# Patient Record
Sex: FEMALE | Race: WHITE | NOT HISPANIC OR LATINO | Employment: OTHER | URBAN - METROPOLITAN AREA
[De-identification: names, ages, dates, MRNs, and addresses within clinical notes are randomized per-mention and may not be internally consistent; named-entity substitution may affect disease eponyms.]

---

## 2017-02-23 ENCOUNTER — COMMUNICATION - HEALTHEAST (OUTPATIENT)
Dept: INTERNAL MEDICINE | Facility: CLINIC | Age: 77
End: 2017-02-23

## 2017-03-01 ENCOUNTER — RECORDS - HEALTHEAST (OUTPATIENT)
Dept: ADMINISTRATIVE | Facility: OTHER | Age: 77
End: 2017-03-01

## 2017-03-02 ENCOUNTER — OFFICE VISIT - HEALTHEAST (OUTPATIENT)
Dept: INTERNAL MEDICINE | Facility: CLINIC | Age: 77
End: 2017-03-02

## 2017-03-02 DIAGNOSIS — D64.9 ANEMIA: ICD-10-CM

## 2017-03-02 DIAGNOSIS — N18.1 CRI (CHRONIC RENAL INSUFFICIENCY), STAGE 1: ICD-10-CM

## 2017-03-02 DIAGNOSIS — I10 HTN (HYPERTENSION): ICD-10-CM

## 2017-03-02 DIAGNOSIS — Z12.11 COLON CANCER SCREENING: ICD-10-CM

## 2017-03-02 DIAGNOSIS — Z90.49 HISTORY OF RIGHT HEMICOLECTOMY: ICD-10-CM

## 2017-03-02 DIAGNOSIS — M81.0 OSTEOPOROSIS: ICD-10-CM

## 2017-03-02 ASSESSMENT — MIFFLIN-ST. JEOR: SCORE: 986.17

## 2017-03-06 ENCOUNTER — AMBULATORY - HEALTHEAST (OUTPATIENT)
Dept: LAB | Facility: CLINIC | Age: 77
End: 2017-03-06

## 2017-03-06 DIAGNOSIS — D64.9 ANEMIA: ICD-10-CM

## 2017-03-06 DIAGNOSIS — M81.0 OSTEOPOROSIS: ICD-10-CM

## 2017-03-06 DIAGNOSIS — I10 HTN (HYPERTENSION): ICD-10-CM

## 2017-03-06 LAB
CHOLEST SERPL-MCNC: 191 MG/DL
FASTING STATUS PATIENT QL REPORTED: YES
HDLC SERPL-MCNC: 91 MG/DL
LDLC SERPL CALC-MCNC: 83 MG/DL
TRIGL SERPL-MCNC: 83 MG/DL

## 2017-05-30 ENCOUNTER — RECORDS - HEALTHEAST (OUTPATIENT)
Dept: ADMINISTRATIVE | Facility: OTHER | Age: 77
End: 2017-05-30

## 2017-06-30 ENCOUNTER — OFFICE VISIT - HEALTHEAST (OUTPATIENT)
Dept: INTERNAL MEDICINE | Facility: CLINIC | Age: 77
End: 2017-06-30

## 2017-06-30 DIAGNOSIS — I10 HTN (HYPERTENSION): ICD-10-CM

## 2017-06-30 DIAGNOSIS — M85.80 OSTEOPENIA: ICD-10-CM

## 2017-06-30 DIAGNOSIS — Z12.11 COLON CANCER SCREENING: ICD-10-CM

## 2017-06-30 DIAGNOSIS — R09.82 PND (POST-NASAL DRIP): ICD-10-CM

## 2017-06-30 DIAGNOSIS — F41.9 ANXIETY: ICD-10-CM

## 2017-06-30 ASSESSMENT — MIFFLIN-ST. JEOR: SCORE: 993.26

## 2017-07-17 ENCOUNTER — OFFICE VISIT - HEALTHEAST (OUTPATIENT)
Dept: BEHAVIORAL HEALTH | Facility: CLINIC | Age: 77
End: 2017-07-17

## 2017-07-17 DIAGNOSIS — F43.22 ADJUSTMENT DISORDER WITH ANXIOUS MOOD: ICD-10-CM

## 2017-07-26 ENCOUNTER — OFFICE VISIT - HEALTHEAST (OUTPATIENT)
Dept: BEHAVIORAL HEALTH | Facility: CLINIC | Age: 77
End: 2017-07-26

## 2017-07-26 DIAGNOSIS — F43.22 ADJUSTMENT DISORDER WITH ANXIOUS MOOD: ICD-10-CM

## 2017-07-30 ENCOUNTER — AMBULATORY - HEALTHEAST (OUTPATIENT)
Dept: BEHAVIORAL HEALTH | Facility: CLINIC | Age: 77
End: 2017-07-30

## 2017-08-02 ENCOUNTER — OFFICE VISIT - HEALTHEAST (OUTPATIENT)
Dept: BEHAVIORAL HEALTH | Facility: CLINIC | Age: 77
End: 2017-08-02

## 2017-08-02 DIAGNOSIS — F43.22 ADJUSTMENT DISORDER WITH ANXIOUS MOOD: ICD-10-CM

## 2017-08-09 ENCOUNTER — OFFICE VISIT - HEALTHEAST (OUTPATIENT)
Dept: BEHAVIORAL HEALTH | Facility: CLINIC | Age: 77
End: 2017-08-09

## 2017-08-09 DIAGNOSIS — F43.22 ADJUSTMENT DISORDER WITH ANXIOUS MOOD: ICD-10-CM

## 2017-08-18 ENCOUNTER — OFFICE VISIT - HEALTHEAST (OUTPATIENT)
Dept: BEHAVIORAL HEALTH | Facility: CLINIC | Age: 77
End: 2017-08-18

## 2017-08-18 DIAGNOSIS — F43.22 ADJUSTMENT DISORDER WITH ANXIOUS MOOD: ICD-10-CM

## 2017-08-25 ENCOUNTER — OFFICE VISIT - HEALTHEAST (OUTPATIENT)
Dept: BEHAVIORAL HEALTH | Facility: CLINIC | Age: 77
End: 2017-08-25

## 2017-08-25 DIAGNOSIS — F43.22 ADJUSTMENT DISORDER WITH ANXIOUS MOOD: ICD-10-CM

## 2017-09-01 ENCOUNTER — OFFICE VISIT - HEALTHEAST (OUTPATIENT)
Dept: BEHAVIORAL HEALTH | Facility: CLINIC | Age: 77
End: 2017-09-01

## 2017-09-01 DIAGNOSIS — F43.22 ADJUSTMENT DISORDER WITH ANXIOUS MOOD: ICD-10-CM

## 2017-09-08 ENCOUNTER — OFFICE VISIT - HEALTHEAST (OUTPATIENT)
Dept: BEHAVIORAL HEALTH | Facility: CLINIC | Age: 77
End: 2017-09-08

## 2017-09-08 DIAGNOSIS — F43.22 ADJUSTMENT DISORDER WITH ANXIOUS MOOD: ICD-10-CM

## 2017-09-15 ENCOUNTER — OFFICE VISIT - HEALTHEAST (OUTPATIENT)
Dept: BEHAVIORAL HEALTH | Facility: CLINIC | Age: 77
End: 2017-09-15

## 2017-09-15 DIAGNOSIS — F43.22 ADJUSTMENT DISORDER WITH ANXIOUS MOOD: ICD-10-CM

## 2017-09-22 ENCOUNTER — OFFICE VISIT - HEALTHEAST (OUTPATIENT)
Dept: BEHAVIORAL HEALTH | Facility: CLINIC | Age: 77
End: 2017-09-22

## 2017-09-22 DIAGNOSIS — F43.22 ADJUSTMENT DISORDER WITH ANXIOUS MOOD: ICD-10-CM

## 2017-09-25 ENCOUNTER — RECORDS - HEALTHEAST (OUTPATIENT)
Dept: MAMMOGRAPHY | Facility: CLINIC | Age: 77
End: 2017-09-25

## 2017-09-25 ENCOUNTER — RECORDS - HEALTHEAST (OUTPATIENT)
Dept: BONE DENSITY | Facility: CLINIC | Age: 77
End: 2017-09-25

## 2017-09-25 ENCOUNTER — RECORDS - HEALTHEAST (OUTPATIENT)
Dept: ADMINISTRATIVE | Facility: OTHER | Age: 77
End: 2017-09-25

## 2017-09-25 DIAGNOSIS — M85.80 OTHER SPECIFIED DISORDERS OF BONE DENSITY AND STRUCTURE, UNSPECIFIED SITE: ICD-10-CM

## 2017-09-25 DIAGNOSIS — Z12.31 ENCOUNTER FOR SCREENING MAMMOGRAM FOR MALIGNANT NEOPLASM OF BREAST: ICD-10-CM

## 2017-09-29 ENCOUNTER — OFFICE VISIT - HEALTHEAST (OUTPATIENT)
Dept: BEHAVIORAL HEALTH | Facility: CLINIC | Age: 77
End: 2017-09-29

## 2017-09-29 DIAGNOSIS — F43.22 ADJUSTMENT DISORDER WITH ANXIOUS MOOD: ICD-10-CM

## 2017-10-06 ENCOUNTER — OFFICE VISIT - HEALTHEAST (OUTPATIENT)
Dept: BEHAVIORAL HEALTH | Facility: CLINIC | Age: 77
End: 2017-10-06

## 2017-10-06 DIAGNOSIS — F43.22 ADJUSTMENT DISORDER WITH ANXIOUS MOOD: ICD-10-CM

## 2017-10-18 ENCOUNTER — OFFICE VISIT - HEALTHEAST (OUTPATIENT)
Dept: FAMILY MEDICINE | Facility: CLINIC | Age: 77
End: 2017-10-18

## 2017-10-18 DIAGNOSIS — R11.10 VOMITING: ICD-10-CM

## 2017-10-18 ASSESSMENT — MIFFLIN-ST. JEOR: SCORE: 947.16

## 2017-10-19 ENCOUNTER — SURGERY - HEALTHEAST (OUTPATIENT)
Dept: SURGERY | Facility: HOSPITAL | Age: 77
End: 2017-10-19

## 2017-10-19 ENCOUNTER — ANESTHESIA - HEALTHEAST (OUTPATIENT)
Dept: SURGERY | Facility: HOSPITAL | Age: 77
End: 2017-10-19

## 2017-10-23 ASSESSMENT — MIFFLIN-ST. JEOR: SCORE: 962.59

## 2017-10-25 ASSESSMENT — MIFFLIN-ST. JEOR: SCORE: 967.12

## 2017-10-26 ASSESSMENT — MIFFLIN-ST. JEOR: SCORE: 979.65

## 2017-10-29 ASSESSMENT — MIFFLIN-ST. JEOR: SCORE: 915.41

## 2017-10-30 ENCOUNTER — COMMUNICATION - HEALTHEAST (OUTPATIENT)
Dept: INTERNAL MEDICINE | Facility: CLINIC | Age: 77
End: 2017-10-30

## 2017-11-03 ENCOUNTER — OFFICE VISIT - HEALTHEAST (OUTPATIENT)
Dept: SURGERY | Facility: CLINIC | Age: 77
End: 2017-11-03

## 2017-11-03 ENCOUNTER — OFFICE VISIT - HEALTHEAST (OUTPATIENT)
Dept: INTERNAL MEDICINE | Facility: CLINIC | Age: 77
End: 2017-11-03

## 2017-11-03 DIAGNOSIS — R79.0 LOW MAGNESIUM LEVEL: ICD-10-CM

## 2017-11-03 DIAGNOSIS — Z09 HOSPITAL DISCHARGE FOLLOW-UP: ICD-10-CM

## 2017-11-03 DIAGNOSIS — Z48.89 POSTOPERATIVE VISIT: ICD-10-CM

## 2017-11-03 DIAGNOSIS — K56.609 SBO (SMALL BOWEL OBSTRUCTION) (H): ICD-10-CM

## 2017-11-03 DIAGNOSIS — I10 HTN (HYPERTENSION): ICD-10-CM

## 2017-11-03 ASSESSMENT — MIFFLIN-ST. JEOR: SCORE: 938.09

## 2017-11-05 ENCOUNTER — COMMUNICATION - HEALTHEAST (OUTPATIENT)
Dept: INTERNAL MEDICINE | Facility: CLINIC | Age: 77
End: 2017-11-05

## 2017-11-06 ENCOUNTER — OFFICE VISIT - HEALTHEAST (OUTPATIENT)
Dept: BEHAVIORAL HEALTH | Facility: CLINIC | Age: 77
End: 2017-11-06

## 2017-11-06 DIAGNOSIS — F43.22 ADJUSTMENT DISORDER WITH ANXIOUS MOOD: ICD-10-CM

## 2017-11-07 ENCOUNTER — COMMUNICATION - HEALTHEAST (OUTPATIENT)
Dept: INTERNAL MEDICINE | Facility: CLINIC | Age: 77
End: 2017-11-07

## 2017-11-07 DIAGNOSIS — D64.9 ANEMIA: ICD-10-CM

## 2017-11-13 ENCOUNTER — AMBULATORY - HEALTHEAST (OUTPATIENT)
Dept: LAB | Facility: CLINIC | Age: 77
End: 2017-11-13

## 2017-11-13 ENCOUNTER — COMMUNICATION - HEALTHEAST (OUTPATIENT)
Dept: SCHEDULING | Facility: CLINIC | Age: 77
End: 2017-11-13

## 2017-11-13 ENCOUNTER — AMBULATORY - HEALTHEAST (OUTPATIENT)
Dept: INTERNAL MEDICINE | Facility: CLINIC | Age: 77
End: 2017-11-13

## 2017-11-13 DIAGNOSIS — D64.9 ANEMIA: ICD-10-CM

## 2017-11-13 DIAGNOSIS — T81.9XXA POST-OPERATIVE COMPLICATION: ICD-10-CM

## 2017-11-14 ENCOUNTER — RECORDS - HEALTHEAST (OUTPATIENT)
Dept: ADMINISTRATIVE | Facility: OTHER | Age: 77
End: 2017-11-14

## 2017-11-16 ENCOUNTER — COMMUNICATION - HEALTHEAST (OUTPATIENT)
Dept: INTERNAL MEDICINE | Facility: CLINIC | Age: 77
End: 2017-11-16

## 2017-11-16 ENCOUNTER — COMMUNICATION - HEALTHEAST (OUTPATIENT)
Dept: ONCOLOGY | Facility: HOSPITAL | Age: 77
End: 2017-11-16

## 2017-11-16 DIAGNOSIS — D72.819 LEUKOPENIA: ICD-10-CM

## 2017-11-16 DIAGNOSIS — D64.9 ANEMIA: ICD-10-CM

## 2017-11-16 DIAGNOSIS — R10.9 ABDOMINAL PAIN: ICD-10-CM

## 2017-11-17 ENCOUNTER — AMBULATORY - HEALTHEAST (OUTPATIENT)
Dept: LAB | Facility: CLINIC | Age: 77
End: 2017-11-17

## 2017-11-17 ENCOUNTER — OFFICE VISIT - HEALTHEAST (OUTPATIENT)
Dept: BEHAVIORAL HEALTH | Facility: CLINIC | Age: 77
End: 2017-11-17

## 2017-11-17 DIAGNOSIS — F43.22 ADJUSTMENT DISORDER WITH ANXIOUS MOOD: ICD-10-CM

## 2017-11-17 DIAGNOSIS — D64.9 ANEMIA: ICD-10-CM

## 2017-11-21 ENCOUNTER — AMBULATORY - HEALTHEAST (OUTPATIENT)
Dept: INFUSION THERAPY | Facility: HOSPITAL | Age: 77
End: 2017-11-21

## 2017-11-21 ENCOUNTER — OFFICE VISIT - HEALTHEAST (OUTPATIENT)
Dept: ONCOLOGY | Facility: HOSPITAL | Age: 77
End: 2017-11-21

## 2017-11-21 DIAGNOSIS — D64.9 CHRONIC ANEMIA: ICD-10-CM

## 2017-11-21 LAB
BASOPHILS # BLD AUTO: 0 THOU/UL (ref 0–0.2)
BASOPHILS NFR BLD AUTO: 0 % (ref 0–2)
DAT, IGG, C3D (HISTORICAL CONVERSION): NORMAL
EOSINOPHIL # BLD AUTO: 0 THOU/UL (ref 0–0.4)
EOSINOPHIL NFR BLD AUTO: 1 % (ref 0–6)
ERYTHROCYTE [DISTWIDTH] IN BLOOD BY AUTOMATED COUNT: 13.6 % (ref 11–14.5)
HCT VFR BLD AUTO: 25.5 % (ref 35–47)
HGB BLD-MCNC: 8.2 G/DL (ref 12–16)
LYMPHOCYTES # BLD AUTO: 0.9 THOU/UL (ref 0.8–4.4)
LYMPHOCYTES NFR BLD AUTO: 21 % (ref 20–40)
MCH RBC QN AUTO: 31.3 PG (ref 27–34)
MCHC RBC AUTO-ENTMCNC: 32.2 G/DL (ref 32–36)
MCV RBC AUTO: 97 FL (ref 80–100)
MONOCYTES # BLD AUTO: 0.4 THOU/UL (ref 0–0.9)
MONOCYTES NFR BLD AUTO: 10 % (ref 2–10)
NEUTROPHILS # BLD AUTO: 2.8 THOU/UL (ref 2–7.7)
NEUTROPHILS NFR BLD AUTO: 68 % (ref 50–70)
PATH REPORT.MICROSCOPIC SPEC OTHER STN: ABNORMAL
PLATELET # BLD AUTO: 229 THOU/UL (ref 140–440)
PMV BLD AUTO: 10.2 FL (ref 8.5–12.5)
RBC # BLD AUTO: 2.62 MILL/UL (ref 3.8–5.4)
WBC: 4.1 THOU/UL (ref 4–11)

## 2017-11-21 ASSESSMENT — MIFFLIN-ST. JEOR: SCORE: 941.72

## 2017-11-22 LAB
LAB AP CHARGES (HE HISTORICAL CONVERSION): NORMAL
PATH REPORT.COMMENTS IMP SPEC: NORMAL
PATH REPORT.COMMENTS IMP SPEC: NORMAL
PATH REPORT.FINAL DX SPEC: NORMAL

## 2017-11-27 ENCOUNTER — OFFICE VISIT - HEALTHEAST (OUTPATIENT)
Dept: INTERNAL MEDICINE | Facility: CLINIC | Age: 77
End: 2017-11-27

## 2017-11-27 DIAGNOSIS — K43.2 INCISIONAL HERNIA: ICD-10-CM

## 2017-11-27 DIAGNOSIS — K22.70 BARRETT'S ESOPHAGUS WITHOUT DYSPLASIA: ICD-10-CM

## 2017-11-27 DIAGNOSIS — M79.662 PAIN OF LEFT CALF: ICD-10-CM

## 2017-11-27 DIAGNOSIS — I10 HTN (HYPERTENSION): ICD-10-CM

## 2017-11-27 DIAGNOSIS — D64.9 ANEMIA: ICD-10-CM

## 2017-11-27 ASSESSMENT — MIFFLIN-ST. JEOR: SCORE: 950.45

## 2017-11-29 ENCOUNTER — COMMUNICATION - HEALTHEAST (OUTPATIENT)
Dept: INTERNAL MEDICINE | Facility: CLINIC | Age: 77
End: 2017-11-29

## 2017-11-29 DIAGNOSIS — R09.82 PND (POST-NASAL DRIP): ICD-10-CM

## 2017-11-30 ENCOUNTER — RECORDS - HEALTHEAST (OUTPATIENT)
Dept: VASCULAR ULTRASOUND | Facility: CLINIC | Age: 77
End: 2017-11-30

## 2017-11-30 ENCOUNTER — RECORDS - HEALTHEAST (OUTPATIENT)
Dept: ADMINISTRATIVE | Facility: OTHER | Age: 77
End: 2017-11-30

## 2017-11-30 ENCOUNTER — COMMUNICATION - HEALTHEAST (OUTPATIENT)
Dept: INTERNAL MEDICINE | Facility: CLINIC | Age: 77
End: 2017-11-30

## 2017-11-30 DIAGNOSIS — M79.662 PAIN IN LEFT LOWER LEG: ICD-10-CM

## 2017-12-08 ENCOUNTER — OFFICE VISIT - HEALTHEAST (OUTPATIENT)
Dept: SURGERY | Facility: CLINIC | Age: 77
End: 2017-12-08

## 2017-12-13 ENCOUNTER — OFFICE VISIT - HEALTHEAST (OUTPATIENT)
Dept: BEHAVIORAL HEALTH | Facility: CLINIC | Age: 77
End: 2017-12-13

## 2017-12-13 DIAGNOSIS — F43.22 ADJUSTMENT DISORDER WITH ANXIOUS MOOD: ICD-10-CM

## 2018-01-05 ENCOUNTER — OFFICE VISIT - HEALTHEAST (OUTPATIENT)
Dept: BEHAVIORAL HEALTH | Facility: CLINIC | Age: 78
End: 2018-01-05

## 2018-01-05 ENCOUNTER — AMBULATORY - HEALTHEAST (OUTPATIENT)
Dept: LAB | Facility: CLINIC | Age: 78
End: 2018-01-05

## 2018-01-05 DIAGNOSIS — F43.22 ADJUSTMENT DISORDER WITH ANXIOUS MOOD: ICD-10-CM

## 2018-01-05 DIAGNOSIS — D64.9 ANEMIA: ICD-10-CM

## 2018-01-05 LAB
BASOPHILS # BLD AUTO: 0 THOU/UL (ref 0–0.2)
BASOPHILS NFR BLD AUTO: 0 % (ref 0–2)
EOSINOPHIL # BLD AUTO: 0.1 THOU/UL (ref 0–0.4)
EOSINOPHIL NFR BLD AUTO: 2 % (ref 0–6)
ERYTHROCYTE [DISTWIDTH] IN BLOOD BY AUTOMATED COUNT: 13.2 % (ref 11–14.5)
HCT VFR BLD AUTO: 36.5 % (ref 35–47)
HGB BLD-MCNC: 11.8 G/DL (ref 12–16)
LYMPHOCYTES # BLD AUTO: 1.2 THOU/UL (ref 0.8–4.4)
LYMPHOCYTES NFR BLD AUTO: 28 % (ref 20–40)
MCH RBC QN AUTO: 28.2 PG (ref 27–34)
MCHC RBC AUTO-ENTMCNC: 32.3 G/DL (ref 32–36)
MCV RBC AUTO: 87 FL (ref 80–100)
MONOCYTES # BLD AUTO: 0.4 THOU/UL (ref 0–0.9)
MONOCYTES NFR BLD AUTO: 9 % (ref 2–10)
NEUTROPHILS # BLD AUTO: 2.7 THOU/UL (ref 2–7.7)
NEUTROPHILS NFR BLD AUTO: 61 % (ref 50–70)
PLATELET # BLD AUTO: 213 THOU/UL (ref 140–440)
PMV BLD AUTO: 8.2 FL (ref 7–10)
RBC # BLD AUTO: 4.18 MILL/UL (ref 3.8–5.4)
WBC: 4.4 THOU/UL (ref 4–11)

## 2018-02-02 ENCOUNTER — OFFICE VISIT - HEALTHEAST (OUTPATIENT)
Dept: BEHAVIORAL HEALTH | Facility: CLINIC | Age: 78
End: 2018-02-02

## 2018-02-02 DIAGNOSIS — F43.22 ADJUSTMENT DISORDER WITH ANXIOUS MOOD: ICD-10-CM

## 2018-02-05 ENCOUNTER — AMBULATORY - HEALTHEAST (OUTPATIENT)
Dept: BEHAVIORAL HEALTH | Facility: CLINIC | Age: 78
End: 2018-02-05

## 2018-02-16 ENCOUNTER — OFFICE VISIT - HEALTHEAST (OUTPATIENT)
Dept: BEHAVIORAL HEALTH | Facility: CLINIC | Age: 78
End: 2018-02-16

## 2018-02-16 DIAGNOSIS — F43.22 ADJUSTMENT DISORDER WITH ANXIOUS MOOD: ICD-10-CM

## 2018-03-06 ENCOUNTER — OFFICE VISIT - HEALTHEAST (OUTPATIENT)
Dept: INTERNAL MEDICINE | Facility: CLINIC | Age: 78
End: 2018-03-06

## 2018-03-06 DIAGNOSIS — K22.70 BARRETT'S ESOPHAGUS WITHOUT DYSPLASIA: ICD-10-CM

## 2018-03-06 DIAGNOSIS — D64.9 ANEMIA, UNSPECIFIED TYPE: ICD-10-CM

## 2018-03-06 DIAGNOSIS — I10 HTN (HYPERTENSION): ICD-10-CM

## 2018-03-06 LAB
BASOPHILS # BLD AUTO: 0 THOU/UL (ref 0–0.2)
BASOPHILS NFR BLD AUTO: 0 % (ref 0–2)
EOSINOPHIL # BLD AUTO: 0.1 THOU/UL (ref 0–0.4)
EOSINOPHIL NFR BLD AUTO: 2 % (ref 0–6)
ERYTHROCYTE [DISTWIDTH] IN BLOOD BY AUTOMATED COUNT: 15.5 % (ref 11–14.5)
HCT VFR BLD AUTO: 39.6 % (ref 35–47)
HGB BLD-MCNC: 13.5 G/DL (ref 12–16)
IRON SATN MFR SERPL: 32 % (ref 20–50)
IRON SERPL-MCNC: 141 UG/DL (ref 42–175)
LYMPHOCYTES # BLD AUTO: 1.2 THOU/UL (ref 0.8–4.4)
LYMPHOCYTES NFR BLD AUTO: 25 % (ref 20–40)
MCH RBC QN AUTO: 29.3 PG (ref 27–34)
MCHC RBC AUTO-ENTMCNC: 34.2 G/DL (ref 32–36)
MCV RBC AUTO: 86 FL (ref 80–100)
MONOCYTES # BLD AUTO: 0.4 THOU/UL (ref 0–0.9)
MONOCYTES NFR BLD AUTO: 8 % (ref 2–10)
NEUTROPHILS # BLD AUTO: 3.2 THOU/UL (ref 2–7.7)
NEUTROPHILS NFR BLD AUTO: 65 % (ref 50–70)
PLATELET # BLD AUTO: 163 THOU/UL (ref 140–440)
PMV BLD AUTO: 8.2 FL (ref 7–10)
RBC # BLD AUTO: 4.62 MILL/UL (ref 3.8–5.4)
TIBC SERPL-MCNC: 435 UG/DL (ref 313–563)
TRANSFERRIN SERPL-MCNC: 348 MG/DL (ref 212–360)
VIT B12 SERPL-MCNC: 693 PG/ML (ref 213–816)
WBC: 4.9 THOU/UL (ref 4–11)

## 2018-03-06 ASSESSMENT — MIFFLIN-ST. JEOR: SCORE: 947.9

## 2018-04-09 ENCOUNTER — OFFICE VISIT - HEALTHEAST (OUTPATIENT)
Dept: BEHAVIORAL HEALTH | Facility: CLINIC | Age: 78
End: 2018-04-09

## 2018-04-09 DIAGNOSIS — F43.22 ADJUSTMENT DISORDER WITH ANXIOUS MOOD: ICD-10-CM

## 2018-04-23 ENCOUNTER — OFFICE VISIT - HEALTHEAST (OUTPATIENT)
Dept: BEHAVIORAL HEALTH | Facility: CLINIC | Age: 78
End: 2018-04-23

## 2018-04-23 DIAGNOSIS — F43.22 ADJUSTMENT DISORDER WITH ANXIOUS MOOD: ICD-10-CM

## 2018-05-07 ENCOUNTER — OFFICE VISIT - HEALTHEAST (OUTPATIENT)
Dept: BEHAVIORAL HEALTH | Facility: CLINIC | Age: 78
End: 2018-05-07

## 2018-05-07 DIAGNOSIS — F43.22 ADJUSTMENT DISORDER WITH ANXIOUS MOOD: ICD-10-CM

## 2018-05-24 ENCOUNTER — RECORDS - HEALTHEAST (OUTPATIENT)
Dept: ADMINISTRATIVE | Facility: OTHER | Age: 78
End: 2018-05-24

## 2018-06-01 ENCOUNTER — OFFICE VISIT - HEALTHEAST (OUTPATIENT)
Dept: BEHAVIORAL HEALTH | Facility: CLINIC | Age: 78
End: 2018-06-01

## 2018-06-01 DIAGNOSIS — F43.22 ADJUSTMENT DISORDER WITH ANXIOUS MOOD: ICD-10-CM

## 2018-06-15 ENCOUNTER — OFFICE VISIT - HEALTHEAST (OUTPATIENT)
Dept: BEHAVIORAL HEALTH | Facility: CLINIC | Age: 78
End: 2018-06-15

## 2018-06-15 DIAGNOSIS — F43.22 ADJUSTMENT DISORDER WITH ANXIOUS MOOD: ICD-10-CM

## 2018-06-29 ENCOUNTER — OFFICE VISIT - HEALTHEAST (OUTPATIENT)
Dept: BEHAVIORAL HEALTH | Facility: CLINIC | Age: 78
End: 2018-06-29

## 2018-06-29 DIAGNOSIS — F43.22 ADJUSTMENT DISORDER WITH ANXIOUS MOOD: ICD-10-CM

## 2018-07-13 ENCOUNTER — OFFICE VISIT - HEALTHEAST (OUTPATIENT)
Dept: BEHAVIORAL HEALTH | Facility: CLINIC | Age: 78
End: 2018-07-13

## 2018-07-13 DIAGNOSIS — F41.1 GAD (GENERALIZED ANXIETY DISORDER): ICD-10-CM

## 2018-07-13 DIAGNOSIS — F43.22 ADJUSTMENT DISORDER WITH ANXIOUS MOOD: ICD-10-CM

## 2018-07-25 ENCOUNTER — OFFICE VISIT - HEALTHEAST (OUTPATIENT)
Dept: INTERNAL MEDICINE | Facility: CLINIC | Age: 78
End: 2018-07-25

## 2018-07-25 DIAGNOSIS — H61.21 EXCESSIVE EAR WAX, RIGHT: ICD-10-CM

## 2018-07-25 DIAGNOSIS — J30.9 ALLERGIC RHINITIS: ICD-10-CM

## 2018-07-25 ASSESSMENT — MIFFLIN-ST. JEOR: SCORE: 963.49

## 2018-07-28 ENCOUNTER — AMBULATORY - HEALTHEAST (OUTPATIENT)
Dept: BEHAVIORAL HEALTH | Facility: CLINIC | Age: 78
End: 2018-07-28

## 2018-09-06 ENCOUNTER — OFFICE VISIT - HEALTHEAST (OUTPATIENT)
Dept: INTERNAL MEDICINE | Facility: CLINIC | Age: 78
End: 2018-09-06

## 2018-09-06 DIAGNOSIS — I10 ESSENTIAL HYPERTENSION: ICD-10-CM

## 2018-09-06 DIAGNOSIS — Z23 FLU VACCINE NEED: ICD-10-CM

## 2018-09-06 ASSESSMENT — MIFFLIN-ST. JEOR: SCORE: 958.96

## 2018-09-07 ENCOUNTER — OFFICE VISIT - HEALTHEAST (OUTPATIENT)
Dept: BEHAVIORAL HEALTH | Facility: CLINIC | Age: 78
End: 2018-09-07

## 2018-09-07 DIAGNOSIS — F41.1 GAD (GENERALIZED ANXIETY DISORDER): ICD-10-CM

## 2018-09-07 DIAGNOSIS — F43.22 ADJUSTMENT DISORDER WITH ANXIOUS MOOD: ICD-10-CM

## 2018-09-10 ENCOUNTER — AMBULATORY - HEALTHEAST (OUTPATIENT)
Dept: NURSING | Facility: CLINIC | Age: 78
End: 2018-09-10

## 2018-09-10 ENCOUNTER — COMMUNICATION - HEALTHEAST (OUTPATIENT)
Dept: INTERNAL MEDICINE | Facility: CLINIC | Age: 78
End: 2018-09-10

## 2018-09-19 ENCOUNTER — AMBULATORY - HEALTHEAST (OUTPATIENT)
Dept: BEHAVIORAL HEALTH | Facility: CLINIC | Age: 78
End: 2018-09-19

## 2018-10-05 ENCOUNTER — OFFICE VISIT - HEALTHEAST (OUTPATIENT)
Dept: BEHAVIORAL HEALTH | Facility: CLINIC | Age: 78
End: 2018-10-05

## 2018-10-05 ENCOUNTER — AMBULATORY - HEALTHEAST (OUTPATIENT)
Dept: BEHAVIORAL HEALTH | Facility: CLINIC | Age: 78
End: 2018-10-05

## 2018-10-05 DIAGNOSIS — F41.1 GAD (GENERALIZED ANXIETY DISORDER): ICD-10-CM

## 2018-10-05 DIAGNOSIS — F43.22 ADJUSTMENT DISORDER WITH ANXIOUS MOOD: ICD-10-CM

## 2018-10-12 ENCOUNTER — OFFICE VISIT - HEALTHEAST (OUTPATIENT)
Dept: BEHAVIORAL HEALTH | Facility: CLINIC | Age: 78
End: 2018-10-12

## 2018-10-12 ENCOUNTER — COMMUNICATION - HEALTHEAST (OUTPATIENT)
Dept: INTERNAL MEDICINE | Facility: CLINIC | Age: 78
End: 2018-10-12

## 2018-10-12 DIAGNOSIS — F43.22 ADJUSTMENT DISORDER WITH ANXIOUS MOOD: ICD-10-CM

## 2018-10-12 DIAGNOSIS — R09.82 PND (POST-NASAL DRIP): ICD-10-CM

## 2018-10-12 DIAGNOSIS — F41.1 GAD (GENERALIZED ANXIETY DISORDER): ICD-10-CM

## 2018-11-02 ENCOUNTER — OFFICE VISIT - HEALTHEAST (OUTPATIENT)
Dept: BEHAVIORAL HEALTH | Facility: CLINIC | Age: 78
End: 2018-11-02

## 2018-11-02 DIAGNOSIS — F43.22 ADJUSTMENT DISORDER WITH ANXIOUS MOOD: ICD-10-CM

## 2018-11-02 DIAGNOSIS — F41.1 GAD (GENERALIZED ANXIETY DISORDER): ICD-10-CM

## 2018-11-15 ENCOUNTER — RECORDS - HEALTHEAST (OUTPATIENT)
Dept: ADMINISTRATIVE | Facility: OTHER | Age: 78
End: 2018-11-15

## 2018-11-30 ENCOUNTER — OFFICE VISIT - HEALTHEAST (OUTPATIENT)
Dept: BEHAVIORAL HEALTH | Facility: CLINIC | Age: 78
End: 2018-11-30

## 2018-11-30 DIAGNOSIS — F41.1 GAD (GENERALIZED ANXIETY DISORDER): ICD-10-CM

## 2018-11-30 DIAGNOSIS — F43.22 ADJUSTMENT DISORDER WITH ANXIOUS MOOD: ICD-10-CM

## 2018-12-12 ENCOUNTER — OFFICE VISIT - HEALTHEAST (OUTPATIENT)
Dept: INTERNAL MEDICINE | Facility: CLINIC | Age: 78
End: 2018-12-12

## 2018-12-12 DIAGNOSIS — K22.70 BARRETT'S ESOPHAGUS WITHOUT DYSPLASIA: ICD-10-CM

## 2018-12-12 DIAGNOSIS — I10 ESSENTIAL HYPERTENSION: ICD-10-CM

## 2018-12-12 ASSESSMENT — MIFFLIN-ST. JEOR: SCORE: 965.76

## 2018-12-21 ENCOUNTER — COMMUNICATION - HEALTHEAST (OUTPATIENT)
Dept: INTERNAL MEDICINE | Facility: CLINIC | Age: 78
End: 2018-12-21

## 2018-12-26 ENCOUNTER — AMBULATORY - HEALTHEAST (OUTPATIENT)
Dept: BEHAVIORAL HEALTH | Facility: CLINIC | Age: 78
End: 2018-12-26

## 2018-12-28 ENCOUNTER — OFFICE VISIT - HEALTHEAST (OUTPATIENT)
Dept: BEHAVIORAL HEALTH | Facility: CLINIC | Age: 78
End: 2018-12-28

## 2018-12-28 ENCOUNTER — AMBULATORY - HEALTHEAST (OUTPATIENT)
Dept: LAB | Facility: CLINIC | Age: 78
End: 2018-12-28

## 2018-12-28 DIAGNOSIS — F41.1 GAD (GENERALIZED ANXIETY DISORDER): ICD-10-CM

## 2018-12-28 DIAGNOSIS — F43.22 ADJUSTMENT DISORDER WITH ANXIOUS MOOD: ICD-10-CM

## 2018-12-28 DIAGNOSIS — I10 ESSENTIAL HYPERTENSION: ICD-10-CM

## 2018-12-28 LAB
ANION GAP SERPL CALCULATED.3IONS-SCNC: 9 MMOL/L (ref 5–18)
BUN SERPL-MCNC: 21 MG/DL (ref 8–28)
CALCIUM SERPL-MCNC: 10.3 MG/DL (ref 8.5–10.5)
CHLORIDE BLD-SCNC: 101 MMOL/L (ref 98–107)
CO2 SERPL-SCNC: 28 MMOL/L (ref 22–31)
CREAT SERPL-MCNC: 0.94 MG/DL (ref 0.6–1.1)
GFR SERPL CREATININE-BSD FRML MDRD: 58 ML/MIN/1.73M2
GLUCOSE BLD-MCNC: 103 MG/DL (ref 70–125)
POTASSIUM BLD-SCNC: 4.3 MMOL/L (ref 3.5–5)
SODIUM SERPL-SCNC: 138 MMOL/L (ref 136–145)

## 2019-01-25 ENCOUNTER — OFFICE VISIT - HEALTHEAST (OUTPATIENT)
Dept: BEHAVIORAL HEALTH | Facility: CLINIC | Age: 79
End: 2019-01-25

## 2019-01-25 ENCOUNTER — AMBULATORY - HEALTHEAST (OUTPATIENT)
Dept: BEHAVIORAL HEALTH | Facility: CLINIC | Age: 79
End: 2019-01-25

## 2019-01-25 DIAGNOSIS — F41.1 GAD (GENERALIZED ANXIETY DISORDER): ICD-10-CM

## 2019-01-25 DIAGNOSIS — F43.22 ADJUSTMENT DISORDER WITH ANXIOUS MOOD: ICD-10-CM

## 2019-03-08 ENCOUNTER — OFFICE VISIT - HEALTHEAST (OUTPATIENT)
Dept: BEHAVIORAL HEALTH | Facility: CLINIC | Age: 79
End: 2019-03-08

## 2019-03-08 DIAGNOSIS — F41.1 GAD (GENERALIZED ANXIETY DISORDER): ICD-10-CM

## 2019-03-08 DIAGNOSIS — F43.22 ADJUSTMENT DISORDER WITH ANXIOUS MOOD: ICD-10-CM

## 2019-03-13 ENCOUNTER — OFFICE VISIT - HEALTHEAST (OUTPATIENT)
Dept: INTERNAL MEDICINE | Facility: CLINIC | Age: 79
End: 2019-03-13

## 2019-03-13 DIAGNOSIS — Z00.00 ROUTINE GENERAL MEDICAL EXAMINATION AT A HEALTH CARE FACILITY: ICD-10-CM

## 2019-03-13 DIAGNOSIS — Z12.31 ENCOUNTER FOR SCREENING MAMMOGRAM FOR BREAST CANCER: ICD-10-CM

## 2019-03-13 DIAGNOSIS — E55.9 VITAMIN D DEFICIENCY: ICD-10-CM

## 2019-03-13 DIAGNOSIS — K22.70 BARRETT'S ESOPHAGUS WITHOUT DYSPLASIA: ICD-10-CM

## 2019-03-13 DIAGNOSIS — I10 ESSENTIAL HYPERTENSION: ICD-10-CM

## 2019-03-13 DIAGNOSIS — Z78.0 POSTMENOPAUSAL STATUS: ICD-10-CM

## 2019-03-13 DIAGNOSIS — R06.02 SOB (SHORTNESS OF BREATH): ICD-10-CM

## 2019-03-13 LAB
ANION GAP SERPL CALCULATED.3IONS-SCNC: 8 MMOL/L (ref 5–18)
BASOPHILS # BLD AUTO: 0 THOU/UL (ref 0–0.2)
BASOPHILS NFR BLD AUTO: 0 % (ref 0–2)
BUN SERPL-MCNC: 19 MG/DL (ref 8–28)
CALCIUM SERPL-MCNC: 9.9 MG/DL (ref 8.5–10.5)
CHLORIDE BLD-SCNC: 104 MMOL/L (ref 98–107)
CO2 SERPL-SCNC: 29 MMOL/L (ref 22–31)
CREAT SERPL-MCNC: 0.97 MG/DL (ref 0.6–1.1)
EOSINOPHIL # BLD AUTO: 0.3 THOU/UL (ref 0–0.4)
EOSINOPHIL NFR BLD AUTO: 6 % (ref 0–6)
ERYTHROCYTE [DISTWIDTH] IN BLOOD BY AUTOMATED COUNT: 10.8 % (ref 11–14.5)
GFR SERPL CREATININE-BSD FRML MDRD: 56 ML/MIN/1.73M2
GLUCOSE BLD-MCNC: 87 MG/DL (ref 70–125)
HCT VFR BLD AUTO: 34.5 % (ref 35–47)
HGB BLD-MCNC: 11.6 G/DL (ref 12–16)
LDLC SERPL CALC-MCNC: 78 MG/DL
LYMPHOCYTES # BLD AUTO: 1 THOU/UL (ref 0.8–4.4)
LYMPHOCYTES NFR BLD AUTO: 20 % (ref 20–40)
MCH RBC QN AUTO: 31 PG (ref 27–34)
MCHC RBC AUTO-ENTMCNC: 33.7 G/DL (ref 32–36)
MCV RBC AUTO: 92 FL (ref 80–100)
MONOCYTES # BLD AUTO: 0.4 THOU/UL (ref 0–0.9)
MONOCYTES NFR BLD AUTO: 8 % (ref 2–10)
NEUTROPHILS # BLD AUTO: 3.4 THOU/UL (ref 2–7.7)
NEUTROPHILS NFR BLD AUTO: 66 % (ref 50–70)
PLATELET # BLD AUTO: 187 THOU/UL (ref 140–440)
PMV BLD AUTO: 8.1 FL (ref 7–10)
POTASSIUM BLD-SCNC: 4.1 MMOL/L (ref 3.5–5)
RBC # BLD AUTO: 3.75 MILL/UL (ref 3.8–5.4)
SODIUM SERPL-SCNC: 141 MMOL/L (ref 136–145)
TSH SERPL DL<=0.005 MIU/L-ACNC: 1.27 UIU/ML (ref 0.3–5)
WBC: 5.2 THOU/UL (ref 4–11)

## 2019-03-13 ASSESSMENT — MIFFLIN-ST. JEOR: SCORE: 961.23

## 2019-03-14 LAB
25(OH)D3 SERPL-MCNC: 39.5 NG/ML (ref 30–80)
25(OH)D3 SERPL-MCNC: 39.5 NG/ML (ref 30–80)
ATRIAL RATE - MUSE: 86 BPM
DIASTOLIC BLOOD PRESSURE - MUSE: NORMAL MMHG
INTERPRETATION ECG - MUSE: NORMAL
P AXIS - MUSE: 52 DEGREES
PR INTERVAL - MUSE: 130 MS
QRS DURATION - MUSE: 90 MS
QT - MUSE: 348 MS
QTC - MUSE: 416 MS
R AXIS - MUSE: -10 DEGREES
SYSTOLIC BLOOD PRESSURE - MUSE: NORMAL MMHG
T AXIS - MUSE: 30 DEGREES
VENTRICULAR RATE- MUSE: 86 BPM

## 2019-03-15 ENCOUNTER — COMMUNICATION - HEALTHEAST (OUTPATIENT)
Dept: INTERNAL MEDICINE | Facility: CLINIC | Age: 79
End: 2019-03-15

## 2019-03-15 DIAGNOSIS — D64.9 ANEMIA, UNSPECIFIED TYPE: ICD-10-CM

## 2019-03-29 ENCOUNTER — OFFICE VISIT - HEALTHEAST (OUTPATIENT)
Dept: BEHAVIORAL HEALTH | Facility: CLINIC | Age: 79
End: 2019-03-29

## 2019-03-29 DIAGNOSIS — F43.22 ADJUSTMENT DISORDER WITH ANXIOUS MOOD: ICD-10-CM

## 2019-03-29 DIAGNOSIS — F41.1 GAD (GENERALIZED ANXIETY DISORDER): ICD-10-CM

## 2019-05-10 ENCOUNTER — OFFICE VISIT - HEALTHEAST (OUTPATIENT)
Dept: BEHAVIORAL HEALTH | Facility: CLINIC | Age: 79
End: 2019-05-10

## 2019-05-10 DIAGNOSIS — F43.22 ADJUSTMENT DISORDER WITH ANXIOUS MOOD: ICD-10-CM

## 2019-05-10 DIAGNOSIS — F41.1 GAD (GENERALIZED ANXIETY DISORDER): ICD-10-CM

## 2019-06-07 ENCOUNTER — OFFICE VISIT - HEALTHEAST (OUTPATIENT)
Dept: INTERNAL MEDICINE | Facility: CLINIC | Age: 79
End: 2019-06-07

## 2019-06-07 DIAGNOSIS — H93.A1 PULSATILE TINNITUS OF RIGHT EAR: ICD-10-CM

## 2019-06-07 DIAGNOSIS — L30.9 ECZEMA, UNSPECIFIED TYPE: ICD-10-CM

## 2019-06-07 DIAGNOSIS — D64.9 ANEMIA, UNSPECIFIED TYPE: ICD-10-CM

## 2019-06-07 LAB
BASOPHILS # BLD AUTO: 0 THOU/UL (ref 0–0.2)
BASOPHILS NFR BLD AUTO: 0 % (ref 0–2)
EOSINOPHIL # BLD AUTO: 0.2 THOU/UL (ref 0–0.4)
EOSINOPHIL NFR BLD AUTO: 4 % (ref 0–6)
ERYTHROCYTE [DISTWIDTH] IN BLOOD BY AUTOMATED COUNT: 12.8 % (ref 11–14.5)
HCT VFR BLD AUTO: 36.3 % (ref 35–47)
HGB BLD-MCNC: 12.1 G/DL (ref 12–16)
IRON SATN MFR SERPL: 25 % (ref 20–50)
IRON SERPL-MCNC: 114 UG/DL (ref 42–175)
LYMPHOCYTES # BLD AUTO: 1.2 THOU/UL (ref 0.8–4.4)
LYMPHOCYTES NFR BLD AUTO: 25 % (ref 20–40)
MCH RBC QN AUTO: 29.5 PG (ref 27–34)
MCHC RBC AUTO-ENTMCNC: 33.3 G/DL (ref 32–36)
MCV RBC AUTO: 89 FL (ref 80–100)
MONOCYTES # BLD AUTO: 0.4 THOU/UL (ref 0–0.9)
MONOCYTES NFR BLD AUTO: 9 % (ref 2–10)
NEUTROPHILS # BLD AUTO: 3 THOU/UL (ref 2–7.7)
NEUTROPHILS NFR BLD AUTO: 62 % (ref 50–70)
PLATELET # BLD AUTO: 188 THOU/UL (ref 140–440)
PMV BLD AUTO: 7.8 FL (ref 7–10)
RBC # BLD AUTO: 4.09 MILL/UL (ref 3.8–5.4)
TIBC SERPL-MCNC: 461 UG/DL (ref 313–563)
TRANSFERRIN SERPL-MCNC: 369 MG/DL (ref 212–360)
VIT B12 SERPL-MCNC: 591 PG/ML (ref 213–816)
WBC: 4.8 THOU/UL (ref 4–11)

## 2019-06-07 RX ORDER — TRIAMCINOLONE ACETONIDE 1 MG/G
CREAM TOPICAL
Qty: 30 G | Refills: 5 | Status: SHIPPED | OUTPATIENT
Start: 2019-06-07 | End: 2021-10-11

## 2019-06-07 RX ORDER — CARBOXYMETHYLCELLULOSE SODIUM 5 MG/ML
2 SOLUTION/ DROPS OPHTHALMIC DAILY PRN
Status: SHIPPED | COMMUNITY
Start: 2019-06-07 | End: 2023-07-21

## 2019-06-10 ENCOUNTER — OFFICE VISIT - HEALTHEAST (OUTPATIENT)
Dept: BEHAVIORAL HEALTH | Facility: CLINIC | Age: 79
End: 2019-06-10

## 2019-06-10 DIAGNOSIS — F41.1 GAD (GENERALIZED ANXIETY DISORDER): ICD-10-CM

## 2019-06-10 DIAGNOSIS — F43.22 ADJUSTMENT DISORDER WITH ANXIOUS MOOD: ICD-10-CM

## 2019-06-24 ENCOUNTER — OFFICE VISIT - HEALTHEAST (OUTPATIENT)
Dept: BEHAVIORAL HEALTH | Facility: CLINIC | Age: 79
End: 2019-06-24

## 2019-06-24 DIAGNOSIS — F43.22 ADJUSTMENT DISORDER WITH ANXIOUS MOOD: ICD-10-CM

## 2019-06-24 DIAGNOSIS — F41.1 GAD (GENERALIZED ANXIETY DISORDER): ICD-10-CM

## 2019-07-19 ENCOUNTER — OFFICE VISIT - HEALTHEAST (OUTPATIENT)
Dept: BEHAVIORAL HEALTH | Facility: CLINIC | Age: 79
End: 2019-07-19

## 2019-07-19 ENCOUNTER — AMBULATORY - HEALTHEAST (OUTPATIENT)
Dept: BEHAVIORAL HEALTH | Facility: CLINIC | Age: 79
End: 2019-07-19

## 2019-07-19 DIAGNOSIS — F41.1 GAD (GENERALIZED ANXIETY DISORDER): ICD-10-CM

## 2019-07-19 DIAGNOSIS — F43.22 ADJUSTMENT DISORDER WITH ANXIOUS MOOD: ICD-10-CM

## 2019-08-23 ENCOUNTER — OFFICE VISIT - HEALTHEAST (OUTPATIENT)
Dept: BEHAVIORAL HEALTH | Facility: CLINIC | Age: 79
End: 2019-08-23

## 2019-08-23 DIAGNOSIS — F43.22 ADJUSTMENT DISORDER WITH ANXIOUS MOOD: ICD-10-CM

## 2019-08-23 DIAGNOSIS — F41.1 GAD (GENERALIZED ANXIETY DISORDER): ICD-10-CM

## 2019-09-10 ENCOUNTER — OFFICE VISIT - HEALTHEAST (OUTPATIENT)
Dept: BEHAVIORAL HEALTH | Facility: CLINIC | Age: 79
End: 2019-09-10

## 2019-09-10 DIAGNOSIS — F43.22 ADJUSTMENT DISORDER WITH ANXIOUS MOOD: ICD-10-CM

## 2019-09-10 DIAGNOSIS — F41.1 GAD (GENERALIZED ANXIETY DISORDER): ICD-10-CM

## 2019-09-27 ENCOUNTER — RECORDS - HEALTHEAST (OUTPATIENT)
Dept: BONE DENSITY | Facility: CLINIC | Age: 79
End: 2019-09-27

## 2019-09-27 ENCOUNTER — RECORDS - HEALTHEAST (OUTPATIENT)
Dept: MAMMOGRAPHY | Facility: CLINIC | Age: 79
End: 2019-09-27

## 2019-09-27 ENCOUNTER — RECORDS - HEALTHEAST (OUTPATIENT)
Dept: ADMINISTRATIVE | Facility: OTHER | Age: 79
End: 2019-09-27

## 2019-09-27 DIAGNOSIS — Z12.31 ENCOUNTER FOR SCREENING MAMMOGRAM FOR MALIGNANT NEOPLASM OF BREAST: ICD-10-CM

## 2019-09-27 DIAGNOSIS — Z78.0 ASYMPTOMATIC MENOPAUSAL STATE: ICD-10-CM

## 2019-10-11 ENCOUNTER — AMBULATORY - HEALTHEAST (OUTPATIENT)
Dept: BEHAVIORAL HEALTH | Facility: CLINIC | Age: 79
End: 2019-10-11

## 2019-10-11 ENCOUNTER — OFFICE VISIT - HEALTHEAST (OUTPATIENT)
Dept: BEHAVIORAL HEALTH | Facility: CLINIC | Age: 79
End: 2019-10-11

## 2019-10-11 DIAGNOSIS — F43.22 ADJUSTMENT DISORDER WITH ANXIOUS MOOD: ICD-10-CM

## 2019-10-11 DIAGNOSIS — F41.1 GAD (GENERALIZED ANXIETY DISORDER): ICD-10-CM

## 2019-10-11 ASSESSMENT — ANXIETY QUESTIONNAIRES
4. TROUBLE RELAXING: SEVERAL DAYS
7. FEELING AFRAID AS IF SOMETHING AWFUL MIGHT HAPPEN: SEVERAL DAYS
6. BECOMING EASILY ANNOYED OR IRRITABLE: NOT AT ALL
2. NOT BEING ABLE TO STOP OR CONTROL WORRYING: SEVERAL DAYS
5. BEING SO RESTLESS THAT IT IS HARD TO SIT STILL: SEVERAL DAYS
3. WORRYING TOO MUCH ABOUT DIFFERENT THINGS: SEVERAL DAYS
IF YOU CHECKED OFF ANY PROBLEMS ON THIS QUESTIONNAIRE, HOW DIFFICULT HAVE THESE PROBLEMS MADE IT FOR YOU TO DO YOUR WORK, TAKE CARE OF THINGS AT HOME, OR GET ALONG WITH OTHER PEOPLE: SOMEWHAT DIFFICULT
GAD7 TOTAL SCORE: 6
1. FEELING NERVOUS, ANXIOUS, OR ON EDGE: SEVERAL DAYS

## 2019-10-11 ASSESSMENT — PATIENT HEALTH QUESTIONNAIRE - PHQ9: SUM OF ALL RESPONSES TO PHQ QUESTIONS 1-9: 2

## 2019-11-15 ENCOUNTER — OFFICE VISIT - HEALTHEAST (OUTPATIENT)
Dept: BEHAVIORAL HEALTH | Facility: CLINIC | Age: 79
End: 2019-11-15

## 2019-11-15 DIAGNOSIS — F43.22 ADJUSTMENT DISORDER WITH ANXIOUS MOOD: ICD-10-CM

## 2019-11-15 DIAGNOSIS — F41.1 GAD (GENERALIZED ANXIETY DISORDER): ICD-10-CM

## 2019-12-05 ENCOUNTER — OFFICE VISIT - HEALTHEAST (OUTPATIENT)
Dept: INTERNAL MEDICINE | Facility: CLINIC | Age: 79
End: 2019-12-05

## 2019-12-05 DIAGNOSIS — I10 ESSENTIAL HYPERTENSION: ICD-10-CM

## 2019-12-05 DIAGNOSIS — K21.9 GASTROESOPHAGEAL REFLUX DISEASE WITHOUT ESOPHAGITIS: ICD-10-CM

## 2019-12-05 DIAGNOSIS — K22.70 BARRETT'S ESOPHAGUS WITHOUT DYSPLASIA: ICD-10-CM

## 2019-12-05 LAB
ANION GAP SERPL CALCULATED.3IONS-SCNC: 5 MMOL/L (ref 5–18)
BASOPHILS # BLD AUTO: 0 THOU/UL (ref 0–0.2)
BASOPHILS NFR BLD AUTO: 0 % (ref 0–2)
BUN SERPL-MCNC: 25 MG/DL (ref 8–28)
CALCIUM SERPL-MCNC: 9.9 MG/DL (ref 8.5–10.5)
CHLORIDE BLD-SCNC: 103 MMOL/L (ref 98–107)
CO2 SERPL-SCNC: 31 MMOL/L (ref 22–31)
CREAT SERPL-MCNC: 0.93 MG/DL (ref 0.6–1.1)
EOSINOPHIL # BLD AUTO: 0.3 THOU/UL (ref 0–0.4)
EOSINOPHIL NFR BLD AUTO: 5 % (ref 0–6)
ERYTHROCYTE [DISTWIDTH] IN BLOOD BY AUTOMATED COUNT: 11.3 % (ref 11–14.5)
GFR SERPL CREATININE-BSD FRML MDRD: 58 ML/MIN/1.73M2
GLUCOSE BLD-MCNC: 96 MG/DL (ref 70–125)
HCT VFR BLD AUTO: 37.2 % (ref 35–47)
HGB BLD-MCNC: 12.1 G/DL (ref 12–16)
LYMPHOCYTES # BLD AUTO: 1.2 THOU/UL (ref 0.8–4.4)
LYMPHOCYTES NFR BLD AUTO: 24 % (ref 20–40)
MCH RBC QN AUTO: 29.8 PG (ref 27–34)
MCHC RBC AUTO-ENTMCNC: 32.5 G/DL (ref 32–36)
MCV RBC AUTO: 92 FL (ref 80–100)
MONOCYTES # BLD AUTO: 0.6 THOU/UL (ref 0–0.9)
MONOCYTES NFR BLD AUTO: 12 % (ref 2–10)
NEUTROPHILS # BLD AUTO: 2.9 THOU/UL (ref 2–7.7)
NEUTROPHILS NFR BLD AUTO: 59 % (ref 50–70)
PLATELET # BLD AUTO: 218 THOU/UL (ref 140–440)
PMV BLD AUTO: 7.6 FL (ref 7–10)
POTASSIUM BLD-SCNC: 4.7 MMOL/L (ref 3.5–5)
RBC # BLD AUTO: 4.05 MILL/UL (ref 3.8–5.4)
SODIUM SERPL-SCNC: 139 MMOL/L (ref 136–145)
WBC: 5 THOU/UL (ref 4–11)

## 2019-12-05 ASSESSMENT — MIFFLIN-ST. JEOR: SCORE: 970.3

## 2020-01-10 ENCOUNTER — OFFICE VISIT - HEALTHEAST (OUTPATIENT)
Dept: BEHAVIORAL HEALTH | Facility: CLINIC | Age: 80
End: 2020-01-10

## 2020-01-10 DIAGNOSIS — F43.22 ADJUSTMENT DISORDER WITH ANXIOUS MOOD: ICD-10-CM

## 2020-01-10 DIAGNOSIS — F41.1 GAD (GENERALIZED ANXIETY DISORDER): ICD-10-CM

## 2020-02-21 ENCOUNTER — OFFICE VISIT - HEALTHEAST (OUTPATIENT)
Dept: BEHAVIORAL HEALTH | Facility: CLINIC | Age: 80
End: 2020-02-21

## 2020-02-21 ENCOUNTER — AMBULATORY - HEALTHEAST (OUTPATIENT)
Dept: BEHAVIORAL HEALTH | Facility: CLINIC | Age: 80
End: 2020-02-21

## 2020-02-21 DIAGNOSIS — F43.22 ADJUSTMENT DISORDER WITH ANXIOUS MOOD: ICD-10-CM

## 2020-02-21 DIAGNOSIS — F41.1 GAD (GENERALIZED ANXIETY DISORDER): ICD-10-CM

## 2020-03-30 ENCOUNTER — AMBULATORY - HEALTHEAST (OUTPATIENT)
Dept: BEHAVIORAL HEALTH | Facility: CLINIC | Age: 80
End: 2020-03-30

## 2020-04-03 ENCOUNTER — OFFICE VISIT - HEALTHEAST (OUTPATIENT)
Dept: BEHAVIORAL HEALTH | Facility: CLINIC | Age: 80
End: 2020-04-03

## 2020-04-03 DIAGNOSIS — F43.22 ADJUSTMENT DISORDER WITH ANXIOUS MOOD: ICD-10-CM

## 2020-04-03 DIAGNOSIS — F41.1 GAD (GENERALIZED ANXIETY DISORDER): ICD-10-CM

## 2020-04-17 ENCOUNTER — OFFICE VISIT - HEALTHEAST (OUTPATIENT)
Dept: BEHAVIORAL HEALTH | Facility: CLINIC | Age: 80
End: 2020-04-17

## 2020-04-17 DIAGNOSIS — F41.1 GAD (GENERALIZED ANXIETY DISORDER): ICD-10-CM

## 2020-04-17 DIAGNOSIS — F43.22 ADJUSTMENT DISORDER WITH ANXIOUS MOOD: ICD-10-CM

## 2020-04-17 ASSESSMENT — PATIENT HEALTH QUESTIONNAIRE - PHQ9: SUM OF ALL RESPONSES TO PHQ QUESTIONS 1-9: 1

## 2020-04-17 ASSESSMENT — ANXIETY QUESTIONNAIRES
4. TROUBLE RELAXING: NOT AT ALL
GAD7 TOTAL SCORE: 2
5. BEING SO RESTLESS THAT IT IS HARD TO SIT STILL: NOT AT ALL
6. BECOMING EASILY ANNOYED OR IRRITABLE: NOT AT ALL
1. FEELING NERVOUS, ANXIOUS, OR ON EDGE: NOT AT ALL
3. WORRYING TOO MUCH ABOUT DIFFERENT THINGS: SEVERAL DAYS
IF YOU CHECKED OFF ANY PROBLEMS ON THIS QUESTIONNAIRE, HOW DIFFICULT HAVE THESE PROBLEMS MADE IT FOR YOU TO DO YOUR WORK, TAKE CARE OF THINGS AT HOME, OR GET ALONG WITH OTHER PEOPLE: SOMEWHAT DIFFICULT
2. NOT BEING ABLE TO STOP OR CONTROL WORRYING: NOT AT ALL
7. FEELING AFRAID AS IF SOMETHING AWFUL MIGHT HAPPEN: SEVERAL DAYS

## 2020-05-14 ENCOUNTER — OFFICE VISIT - HEALTHEAST (OUTPATIENT)
Dept: BEHAVIORAL HEALTH | Facility: CLINIC | Age: 80
End: 2020-05-14

## 2020-05-14 DIAGNOSIS — F43.22 ADJUSTMENT DISORDER WITH ANXIOUS MOOD: ICD-10-CM

## 2020-05-14 DIAGNOSIS — F41.1 GAD (GENERALIZED ANXIETY DISORDER): ICD-10-CM

## 2020-06-11 ENCOUNTER — OFFICE VISIT - HEALTHEAST (OUTPATIENT)
Dept: BEHAVIORAL HEALTH | Facility: CLINIC | Age: 80
End: 2020-06-11

## 2020-06-11 DIAGNOSIS — F43.22 ADJUSTMENT DISORDER WITH ANXIOUS MOOD: ICD-10-CM

## 2020-06-11 DIAGNOSIS — F41.1 GAD (GENERALIZED ANXIETY DISORDER): ICD-10-CM

## 2020-07-28 ENCOUNTER — OFFICE VISIT - HEALTHEAST (OUTPATIENT)
Dept: BEHAVIORAL HEALTH | Facility: CLINIC | Age: 80
End: 2020-07-28

## 2020-07-28 DIAGNOSIS — F41.1 GAD (GENERALIZED ANXIETY DISORDER): ICD-10-CM

## 2020-08-21 ENCOUNTER — AMBULATORY - HEALTHEAST (OUTPATIENT)
Dept: BEHAVIORAL HEALTH | Facility: CLINIC | Age: 80
End: 2020-08-21

## 2020-08-21 ENCOUNTER — OFFICE VISIT - HEALTHEAST (OUTPATIENT)
Dept: BEHAVIORAL HEALTH | Facility: CLINIC | Age: 80
End: 2020-08-21

## 2020-08-21 DIAGNOSIS — F43.22 ADJUSTMENT DISORDER WITH ANXIOUS MOOD: ICD-10-CM

## 2020-08-21 DIAGNOSIS — F41.1 GAD (GENERALIZED ANXIETY DISORDER): ICD-10-CM

## 2020-08-21 ASSESSMENT — ANXIETY QUESTIONNAIRES
2. NOT BEING ABLE TO STOP OR CONTROL WORRYING: NOT AT ALL
4. TROUBLE RELAXING: SEVERAL DAYS
6. BECOMING EASILY ANNOYED OR IRRITABLE: NOT AT ALL
5. BEING SO RESTLESS THAT IT IS HARD TO SIT STILL: NOT AT ALL
1. FEELING NERVOUS, ANXIOUS, OR ON EDGE: MORE THAN HALF THE DAYS
7. FEELING AFRAID AS IF SOMETHING AWFUL MIGHT HAPPEN: SEVERAL DAYS
3. WORRYING TOO MUCH ABOUT DIFFERENT THINGS: MORE THAN HALF THE DAYS
GAD7 TOTAL SCORE: 6
IF YOU CHECKED OFF ANY PROBLEMS ON THIS QUESTIONNAIRE, HOW DIFFICULT HAVE THESE PROBLEMS MADE IT FOR YOU TO DO YOUR WORK, TAKE CARE OF THINGS AT HOME, OR GET ALONG WITH OTHER PEOPLE: SOMEWHAT DIFFICULT

## 2020-08-21 ASSESSMENT — PATIENT HEALTH QUESTIONNAIRE - PHQ9: SUM OF ALL RESPONSES TO PHQ QUESTIONS 1-9: 2

## 2020-09-11 ENCOUNTER — OFFICE VISIT - HEALTHEAST (OUTPATIENT)
Dept: BEHAVIORAL HEALTH | Facility: CLINIC | Age: 80
End: 2020-09-11

## 2020-09-11 DIAGNOSIS — F43.22 ADJUSTMENT DISORDER WITH ANXIOUS MOOD: ICD-10-CM

## 2020-09-11 DIAGNOSIS — F41.1 GAD (GENERALIZED ANXIETY DISORDER): ICD-10-CM

## 2020-09-17 ENCOUNTER — OFFICE VISIT - HEALTHEAST (OUTPATIENT)
Dept: INTERNAL MEDICINE | Facility: CLINIC | Age: 80
End: 2020-09-17

## 2020-09-17 DIAGNOSIS — Z12.31 ENCOUNTER FOR SCREENING MAMMOGRAM FOR BREAST CANCER: ICD-10-CM

## 2020-09-17 DIAGNOSIS — Z78.0 POST-MENOPAUSAL: ICD-10-CM

## 2020-09-17 DIAGNOSIS — I10 ESSENTIAL HYPERTENSION, BENIGN: ICD-10-CM

## 2020-09-17 DIAGNOSIS — K22.70 BARRETT'S ESOPHAGUS WITHOUT DYSPLASIA: ICD-10-CM

## 2020-09-17 DIAGNOSIS — E55.9 VITAMIN D DEFICIENCY: ICD-10-CM

## 2020-09-17 DIAGNOSIS — Z00.00 ROUTINE GENERAL MEDICAL EXAMINATION AT A HEALTH CARE FACILITY: ICD-10-CM

## 2020-09-17 DIAGNOSIS — Z63.6 CAREGIVER STRESS: ICD-10-CM

## 2020-09-17 DIAGNOSIS — K21.00 GASTROESOPHAGEAL REFLUX DISEASE WITH ESOPHAGITIS: ICD-10-CM

## 2020-09-17 LAB
ALBUMIN SERPL-MCNC: 4.2 G/DL (ref 3.5–5)
ALP SERPL-CCNC: 68 U/L (ref 45–120)
ALT SERPL W P-5'-P-CCNC: 20 U/L (ref 0–45)
ANION GAP SERPL CALCULATED.3IONS-SCNC: 8 MMOL/L (ref 5–18)
AST SERPL W P-5'-P-CCNC: 29 U/L (ref 0–40)
BASOPHILS # BLD AUTO: 0 THOU/UL (ref 0–0.2)
BASOPHILS NFR BLD AUTO: 1 % (ref 0–2)
BILIRUB SERPL-MCNC: 0.7 MG/DL (ref 0–1)
BUN SERPL-MCNC: 16 MG/DL (ref 8–28)
CALCIUM SERPL-MCNC: 10 MG/DL (ref 8.5–10.5)
CHLORIDE BLD-SCNC: 102 MMOL/L (ref 98–107)
CHOLEST SERPL-MCNC: 199 MG/DL
CO2 SERPL-SCNC: 29 MMOL/L (ref 22–31)
CREAT SERPL-MCNC: 0.89 MG/DL (ref 0.6–1.1)
EOSINOPHIL # BLD AUTO: 0.2 THOU/UL (ref 0–0.4)
EOSINOPHIL NFR BLD AUTO: 3 % (ref 0–6)
ERYTHROCYTE [DISTWIDTH] IN BLOOD BY AUTOMATED COUNT: 12.7 % (ref 11–14.5)
FASTING STATUS PATIENT QL REPORTED: YES
GFR SERPL CREATININE-BSD FRML MDRD: >60 ML/MIN/1.73M2
GLUCOSE BLD-MCNC: 102 MG/DL (ref 70–125)
HCT VFR BLD AUTO: 41.5 % (ref 35–47)
HDLC SERPL-MCNC: 96 MG/DL
HGB BLD-MCNC: 13.8 G/DL (ref 12–16)
LDLC SERPL CALC-MCNC: 87 MG/DL
LYMPHOCYTES # BLD AUTO: 1.1 THOU/UL (ref 0.8–4.4)
LYMPHOCYTES NFR BLD AUTO: 21 % (ref 20–40)
MCH RBC QN AUTO: 31.7 PG (ref 27–34)
MCHC RBC AUTO-ENTMCNC: 33.3 G/DL (ref 32–36)
MCV RBC AUTO: 95 FL (ref 80–100)
MONOCYTES # BLD AUTO: 0.4 THOU/UL (ref 0–0.9)
MONOCYTES NFR BLD AUTO: 7 % (ref 2–10)
NEUTROPHILS # BLD AUTO: 3.7 THOU/UL (ref 2–7.7)
NEUTROPHILS NFR BLD AUTO: 69 % (ref 50–70)
PLATELET # BLD AUTO: 174 THOU/UL (ref 140–440)
PMV BLD AUTO: 8.3 FL (ref 7–10)
POTASSIUM BLD-SCNC: 4.7 MMOL/L (ref 3.5–5)
PROT SERPL-MCNC: 7.2 G/DL (ref 6–8)
RBC # BLD AUTO: 4.35 MILL/UL (ref 3.8–5.4)
SODIUM SERPL-SCNC: 139 MMOL/L (ref 136–145)
TRIGL SERPL-MCNC: 79 MG/DL
TSH SERPL DL<=0.005 MIU/L-ACNC: 1.37 UIU/ML (ref 0.3–5)
WBC: 5.4 THOU/UL (ref 4–11)

## 2020-09-17 SDOH — SOCIAL STABILITY - SOCIAL INSECURITY: DEPENDENT RELATIVE NEEDING CARE AT HOME: Z63.6

## 2020-09-17 ASSESSMENT — MIFFLIN-ST. JEOR: SCORE: 971.44

## 2020-09-17 ASSESSMENT — PATIENT HEALTH QUESTIONNAIRE - PHQ9: SUM OF ALL RESPONSES TO PHQ QUESTIONS 1-9: 4

## 2020-09-18 LAB
25(OH)D3 SERPL-MCNC: 47.9 NG/ML (ref 30–80)
25(OH)D3 SERPL-MCNC: 47.9 NG/ML (ref 30–80)

## 2020-09-23 ENCOUNTER — COMMUNICATION - HEALTHEAST (OUTPATIENT)
Dept: INTERNAL MEDICINE | Facility: CLINIC | Age: 80
End: 2020-09-23

## 2020-10-13 ENCOUNTER — OFFICE VISIT - HEALTHEAST (OUTPATIENT)
Dept: BEHAVIORAL HEALTH | Facility: CLINIC | Age: 80
End: 2020-10-13

## 2020-10-13 DIAGNOSIS — F41.1 GAD (GENERALIZED ANXIETY DISORDER): ICD-10-CM

## 2020-11-11 ENCOUNTER — OFFICE VISIT - HEALTHEAST (OUTPATIENT)
Dept: BEHAVIORAL HEALTH | Facility: CLINIC | Age: 80
End: 2020-11-11

## 2020-11-11 DIAGNOSIS — Z63.6 CAREGIVER STRESS: ICD-10-CM

## 2020-11-11 DIAGNOSIS — F43.22 ADJUSTMENT DISORDER WITH ANXIOUS MOOD: ICD-10-CM

## 2020-11-11 DIAGNOSIS — F41.1 GAD (GENERALIZED ANXIETY DISORDER): ICD-10-CM

## 2020-11-11 SDOH — SOCIAL STABILITY - SOCIAL INSECURITY: DEPENDENT RELATIVE NEEDING CARE AT HOME: Z63.6

## 2020-11-30 ENCOUNTER — COMMUNICATION - HEALTHEAST (OUTPATIENT)
Dept: INTERNAL MEDICINE | Facility: CLINIC | Age: 80
End: 2020-11-30

## 2020-11-30 DIAGNOSIS — I10 ESSENTIAL HYPERTENSION: ICD-10-CM

## 2020-12-01 RX ORDER — AMLODIPINE BESYLATE 2.5 MG/1
TABLET ORAL
Qty: 180 TABLET | Refills: 2 | Status: SHIPPED | OUTPATIENT
Start: 2020-12-01 | End: 2021-09-10

## 2020-12-01 RX ORDER — LOSARTAN POTASSIUM 25 MG/1
TABLET ORAL
Qty: 90 TABLET | Refills: 2 | Status: SHIPPED | OUTPATIENT
Start: 2020-12-01 | End: 2021-08-20

## 2020-12-09 ENCOUNTER — OFFICE VISIT - HEALTHEAST (OUTPATIENT)
Dept: BEHAVIORAL HEALTH | Facility: CLINIC | Age: 80
End: 2020-12-09

## 2020-12-09 ENCOUNTER — AMBULATORY - HEALTHEAST (OUTPATIENT)
Dept: BEHAVIORAL HEALTH | Facility: CLINIC | Age: 80
End: 2020-12-09

## 2020-12-09 DIAGNOSIS — Z63.6 CAREGIVER STRESS: ICD-10-CM

## 2020-12-09 DIAGNOSIS — F41.1 GAD (GENERALIZED ANXIETY DISORDER): ICD-10-CM

## 2020-12-09 SDOH — SOCIAL STABILITY - SOCIAL INSECURITY: DEPENDENT RELATIVE NEEDING CARE AT HOME: Z63.6

## 2020-12-09 ASSESSMENT — ANXIETY QUESTIONNAIRES
2. NOT BEING ABLE TO STOP OR CONTROL WORRYING: NOT AT ALL
3. WORRYING TOO MUCH ABOUT DIFFERENT THINGS: SEVERAL DAYS
5. BEING SO RESTLESS THAT IT IS HARD TO SIT STILL: NOT AT ALL
1. FEELING NERVOUS, ANXIOUS, OR ON EDGE: MORE THAN HALF THE DAYS
6. BECOMING EASILY ANNOYED OR IRRITABLE: NOT AT ALL
4. TROUBLE RELAXING: SEVERAL DAYS
IF YOU CHECKED OFF ANY PROBLEMS ON THIS QUESTIONNAIRE, HOW DIFFICULT HAVE THESE PROBLEMS MADE IT FOR YOU TO DO YOUR WORK, TAKE CARE OF THINGS AT HOME, OR GET ALONG WITH OTHER PEOPLE: SOMEWHAT DIFFICULT
7. FEELING AFRAID AS IF SOMETHING AWFUL MIGHT HAPPEN: SEVERAL DAYS
GAD7 TOTAL SCORE: 5

## 2020-12-09 ASSESSMENT — PATIENT HEALTH QUESTIONNAIRE - PHQ9: SUM OF ALL RESPONSES TO PHQ QUESTIONS 1-9: 2

## 2021-01-05 ENCOUNTER — OFFICE VISIT - HEALTHEAST (OUTPATIENT)
Dept: BEHAVIORAL HEALTH | Facility: CLINIC | Age: 81
End: 2021-01-05

## 2021-01-05 DIAGNOSIS — F41.1 GAD (GENERALIZED ANXIETY DISORDER): ICD-10-CM

## 2021-01-05 DIAGNOSIS — Z63.6 CAREGIVER STRESS: ICD-10-CM

## 2021-01-05 SDOH — SOCIAL STABILITY - SOCIAL INSECURITY: DEPENDENT RELATIVE NEEDING CARE AT HOME: Z63.6

## 2021-02-04 ENCOUNTER — OFFICE VISIT - HEALTHEAST (OUTPATIENT)
Dept: BEHAVIORAL HEALTH | Facility: CLINIC | Age: 81
End: 2021-02-04

## 2021-02-04 DIAGNOSIS — Z63.6 CAREGIVER STRESS: ICD-10-CM

## 2021-02-04 DIAGNOSIS — F41.1 GAD (GENERALIZED ANXIETY DISORDER): ICD-10-CM

## 2021-02-04 SDOH — SOCIAL STABILITY - SOCIAL INSECURITY: DEPENDENT RELATIVE NEEDING CARE AT HOME: Z63.6

## 2021-02-10 ENCOUNTER — AMBULATORY - HEALTHEAST (OUTPATIENT)
Dept: NURSING | Facility: CLINIC | Age: 81
End: 2021-02-10

## 2021-03-02 ENCOUNTER — RECORDS - HEALTHEAST (OUTPATIENT)
Dept: INTERNAL MEDICINE | Facility: CLINIC | Age: 81
End: 2021-03-02

## 2021-03-02 DIAGNOSIS — Z78.0 POST-MENOPAUSAL: ICD-10-CM

## 2021-03-03 ENCOUNTER — AMBULATORY - HEALTHEAST (OUTPATIENT)
Dept: NURSING | Facility: CLINIC | Age: 81
End: 2021-03-03

## 2021-03-04 ENCOUNTER — AMBULATORY - HEALTHEAST (OUTPATIENT)
Dept: BEHAVIORAL HEALTH | Facility: CLINIC | Age: 81
End: 2021-03-04

## 2021-03-04 ENCOUNTER — OFFICE VISIT - HEALTHEAST (OUTPATIENT)
Dept: BEHAVIORAL HEALTH | Facility: CLINIC | Age: 81
End: 2021-03-04

## 2021-03-04 DIAGNOSIS — F41.1 GAD (GENERALIZED ANXIETY DISORDER): ICD-10-CM

## 2021-03-04 DIAGNOSIS — Z63.6 CAREGIVER STRESS: ICD-10-CM

## 2021-03-04 SDOH — SOCIAL STABILITY - SOCIAL INSECURITY: DEPENDENT RELATIVE NEEDING CARE AT HOME: Z63.6

## 2021-03-04 ASSESSMENT — ANXIETY QUESTIONNAIRES
6. BECOMING EASILY ANNOYED OR IRRITABLE: NOT AT ALL
IF YOU CHECKED OFF ANY PROBLEMS ON THIS QUESTIONNAIRE, HOW DIFFICULT HAVE THESE PROBLEMS MADE IT FOR YOU TO DO YOUR WORK, TAKE CARE OF THINGS AT HOME, OR GET ALONG WITH OTHER PEOPLE: SOMEWHAT DIFFICULT
GAD7 TOTAL SCORE: 3
3. WORRYING TOO MUCH ABOUT DIFFERENT THINGS: SEVERAL DAYS
5. BEING SO RESTLESS THAT IT IS HARD TO SIT STILL: NOT AT ALL
1. FEELING NERVOUS, ANXIOUS, OR ON EDGE: SEVERAL DAYS
2. NOT BEING ABLE TO STOP OR CONTROL WORRYING: NOT AT ALL
4. TROUBLE RELAXING: SEVERAL DAYS
7. FEELING AFRAID AS IF SOMETHING AWFUL MIGHT HAPPEN: NOT AT ALL

## 2021-03-04 ASSESSMENT — PATIENT HEALTH QUESTIONNAIRE - PHQ9: SUM OF ALL RESPONSES TO PHQ QUESTIONS 1-9: 1

## 2021-04-01 ENCOUNTER — OFFICE VISIT - HEALTHEAST (OUTPATIENT)
Dept: BEHAVIORAL HEALTH | Facility: CLINIC | Age: 81
End: 2021-04-01

## 2021-04-01 DIAGNOSIS — F41.1 GAD (GENERALIZED ANXIETY DISORDER): ICD-10-CM

## 2021-04-15 ENCOUNTER — OFFICE VISIT - HEALTHEAST (OUTPATIENT)
Dept: BEHAVIORAL HEALTH | Facility: CLINIC | Age: 81
End: 2021-04-15

## 2021-04-15 DIAGNOSIS — Z63.6 CAREGIVER STRESS: ICD-10-CM

## 2021-04-15 SDOH — SOCIAL STABILITY - SOCIAL INSECURITY: DEPENDENT RELATIVE NEEDING CARE AT HOME: Z63.6

## 2021-05-04 ENCOUNTER — AMBULATORY - HEALTHEAST (OUTPATIENT)
Dept: BEHAVIORAL HEALTH | Facility: CLINIC | Age: 81
End: 2021-05-04

## 2021-05-26 ASSESSMENT — PATIENT HEALTH QUESTIONNAIRE - PHQ9
SUM OF ALL RESPONSES TO PHQ QUESTIONS 1-9: 2
SUM OF ALL RESPONSES TO PHQ QUESTIONS 1-9: 2

## 2021-05-27 ASSESSMENT — PATIENT HEALTH QUESTIONNAIRE - PHQ9
SUM OF ALL RESPONSES TO PHQ QUESTIONS 1-9: 2
SUM OF ALL RESPONSES TO PHQ QUESTIONS 1-9: 1
SUM OF ALL RESPONSES TO PHQ QUESTIONS 1-9: 1
SUM OF ALL RESPONSES TO PHQ QUESTIONS 1-9: 4

## 2021-05-27 NOTE — PROGRESS NOTES
Mental Health Visit Note    3/29/2019     Start time: 10:00    Stop Time: 11:00  Session # 3    Ora Og is a 78 y.o. female is being seen today for a follow-up individual psychotherapy appointment    Chief Complaint   Patient presents with     MH Follow Up     Depression     Anxiety    .     New symptoms or complaints:  none    Functional Impairment:   The patient identifies the how daily stressors affect daily functioning in today's session as follows (Scale is 1-4, 1=not at all or rarely; 4=extremely so/everyday.)    Personal: 2  Family: 2  Social: 2  Work: retired    Clinical assessment of mental status:   Ora Og presented on time.  No changes since last session 3/8/2019.  She was oriented x3, open and cooperative, and dressed appropriately for this session and weather. Her memory was Normal cognitive functioning .  Her speech was  Within normal.  Language was talkative, spontaneous, normal.  Concentration and focus is Within normal. Psychosis is not noted or reported. She reports her mood is Congruent w/content of speech.  Affect is congruent with speech and is Congruent w/content of speech.  Fund of knowledge is adequate. Insight is adequate for therapy.      Suicidal/Homicidal Ideation present:   No,  Patient denies suicidal and homicidal ideations/means or plans.      Patient's impression of their current status:  Pt reported  has responded well to her greater ability to speak assertively and is more grateful and caring toward her. She reports cont detachment from son with alcoholism and that he is advancing in his recovery. 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 Notes felt sad after last session talking about her mother with whom she never had amicable relationship. Noted being more assertive works better than she would have thought. Reviewed family tree re; children and noted alliances among 15 children. Reported some guilt  over sending 3 sons to [facility] where several priests were pedophiles. Will cont to explore family dynamics and assertiveness. Pt finding therapy helpful in finding her voice and strengthening confidence. Reviewed tx plan and progress and pt agreed to tx goals and methods    Therapist impression of patient's current state:   Ora Og presents with less depression and anxiety and improved health. Pt gaining confidence and authenticity through greater assertiveness.  Used MI to discuss assisting others and boundaries to asssist pt gain insight into wants and neeeds. Will discuss ways of using that maternal part of self to nurture and express and set boundaries for self using AIR competency based interventions. Pt to read cultivating a resilient spirit handout.     Type of psychotherapeutic technique provided:   Insight oriented, Client centered and Solution-focused. AIR    Progress toward short term goals: attended  session, setting boundaries, involved with family, not personalizing other peoples problems    Review of long term goals: to improve health and avoid enabling of others, to continue to focus on pursuing meaningful activities, improve conscious contact with higher power, put self first without guilt. Maintain effective boundaries. Date of tx plan review: 3/29/19. Date of next tx plan review: 6/29/19.  .    Diagnosis:   1. Adjustment disorder with anxious mood    2. BREN (generalized anxiety disorder)    Improving     Plan and Follow-up:   Patient will follow safety plan of seeking help from friend/family, calling [facility], calling 911, and/or presenting to the ED if necessary.  Patient will be compliant with medications and attend appointments as scheduled.  Follow recommendations of medical providers  email slaome    Discharge Criteria/Planning: Patient will continue with follow-up until therapy can be discontinued without return of signs and symptoms.    Signatures:   Performed and documented  by Wali Carrion, MSW, LICSW, LADC

## 2021-05-28 ASSESSMENT — ANXIETY QUESTIONNAIRES
GAD7 TOTAL SCORE: 5
GAD7 TOTAL SCORE: 6
GAD7 TOTAL SCORE: 2
GAD7 TOTAL SCORE: 3
GAD7 TOTAL SCORE: 6

## 2021-05-28 NOTE — PROGRESS NOTES
Mental Health Visit Note    5/10/2019     Start time: 10:00    Stop Time: 11:00  Session # 4    Ora Og is a 78 y.o. female is being seen today for a follow-up individual psychotherapy appointment    Chief Complaint   Patient presents with      Follow Up     stress related to DD daughter's commitment to psych bed at [facility] due to aggressive behaviors     Depression     Anxiety    .     New symptoms or complaints:  none    Functional Impairment:   The patient identifies the how daily stressors affect daily functioning in today's session as follows (Scale is 1-4, 1=not at all or rarely; 4=extremely so/everyday.)    Personal: 2  Family: 2  Social: 2  Work: retired    Clinical assessment of mental status:   Ora Og presented on time.  No changes since last session 3/29/2019.  She was oriented x3, open and cooperative, and dressed appropriately for this session and weather. Her memory was Normal cognitive functioning .  Her speech was  Within normal.  Language was talkative, spontaneous, normal.  Concentration and focus is Within normal. Psychosis is not noted or reported. She reports her mood is Congruent w/content of speech.  Affect is congruent with speech and is Congruent w/content of speech.  Fund of knowledge is adequate. Insight is adequate for therapy.      Suicidal/Homicidal Ideation present:   No,  Patient denies suicidal and homicidal ideations/means or plans.      Patient's impression of their current status:  Pt reported  cont to respond well to her greater ability to speak assertively and is more grateful and caring toward her. She reports cares less about what others think and is more authentic and assertive. Anxious over daughter with DD behav problems leading to hospitalization at [facility] but able to detach to avoid rescuing her. she reports cont detachment from son with alcoholism and emailed him to offer time to meet if he is interested, son who quit [employer] shop has new job,  and that he is advancing in his recovery.  Noted being more assertive works better than she would have thought. Did not review family tree re; children and noted alliances among 15 children.  Pt noted sewing, baking, reading exercise are all activities that fulfill and nurture her. Will cont to explore family dynamics and assertiveness. Pt finding therapy helpful in finding her voice and strengthening confidence. Reviewed tx plan and progress and pt agreed to tx goals and methods. Discussed letting go of judgments toward others who are not devout catholics and accepting childrens divorces (3)    Therapist impression of patient's current state:   Ora Og cont to present with less depression and anxiety and improved health. Pt gaining confidence and authenticity through greater assertiveness.  Used MI to discuss assisting others and boundaries to asssist pt gain insight into wants and neeeds. Will continue to discuss ways of using that maternal part of self to nurture and express and set boundaries for self using AIR competency based interventions. Pt reviewed and found cultivating a resilient spirit handout helpful in guiding her efforts toward resilience.     Type of psychotherapeutic technique provided:   Insight oriented, Client centered and Solution-focused. AIR    Progress toward short term goals: attended  session, setting boundaries, involved with family, not personalizing other peoples problems    Review of long term goals: to improve health and avoid enabling of others, to continue to focus on pursuing meaningful activities, improve conscious contact with higher power, put self first without guilt. Maintain effective boundaries. Date of tx plan review: 3/29/19. Date of next tx plan review: 6/29/19.  .    Diagnosis:   1. Adjustment disorder with anxious mood    2. BREN (generalized anxiety disorder)    Improving     Plan and Follow-up:   Patient will follow safety plan of seeking help from  friend/family, calling [facility], calling 911, and/or presenting to the ED if necessary.  Patient will be compliant with medications and attend appointments as scheduled.  Follow recommendations of medical providers  Continue to spend time in fulfilling activities.  Avoid judging others    Discharge Criteria/Planning: Patient will continue with follow-up until therapy can be discontinued without return of signs and symptoms.    Signatures:   Performed and documented by Wali Carrion, TABBY, LICSW, LADC

## 2021-05-29 NOTE — PROGRESS NOTES
ASSESSMENT AND PLAN:    1. Eczema, unspecified type  Likely a dryness eczema. We discussed using mild soaps. She can use triamcinolone cream prn, but I explained that she should not use it on her face.    - triamcinolone (KENALOG) 0.1 % cream; Apply three times a day prn to body eczema  Dispense: 30 g; Refill: 5    2. Anemia, unspecified type  Moderately severe, In 2017, followed hospitalization for SBO.  No clear etiology found, and has seemed to resolve.  No active bleeding symptoms are reported.  History of Smith's esophagus.  Dr. Diaz has wanted to follow iron and hgb.  We discussed that Smith's esophagus and occult esophagitis can be cause of iron deficiency, but that progressive iron deficiency should always be evaluated with periodic colonoscopy.  Will assess hemoglobin, and B12 and iron studies previously ordered by Dr. Diaz.   - HM1(CBC and Differential)  - Iron and Transferrin Iron Binding Capacity  - Vitamin B12    3. Pulsatile tinnitus of right ear  No cerumen impaction.  No associated vertigo, or hearing loss.  No bruit over normal feeling carotid pulse.  Suspect physiologic 'pulse tinnitus', related to aging and stiffening of carotid artery.  Reassured.  Cardiac arrhythmia is not suggested.     Patient Instructions   1. Use 1% hydrocortisone cream to face as needed for eczema.     2. Use 0.1% triamcinolone cream to body eczema.     3. Reassured about the pulse noise in the right ear.    4. Check hemoglobin today    5 Follow up Dr. Diaz in fall as previously arranged.      CHIEF COMPLAINT:  Chief Complaint   Patient presents with     Rash     pt states ongoing issue with eczema, getting worst      Ear Fullness     pt states pounding in right ear on and off      HISTORY OF PRESENT ILLNESS:  Ora Og is a 78 y.o. female here with symptoms of pulsatile ear noise on the right ear.  No vertigo, or noted hearing loss.  The sound is the pulse, not ringing or buzzing.  Also, has been  using 1% hydrocortisone for eczema, with some improvement, but would like 'something stronger' which she reports she was offered in the past.      REVIEW OF SYSTEMS:   See HPI, all other systems on review are negative.    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 LE DVT's     Pneumonia      Rheumatic fever      Sepsis (H)     postop, & Acute renal failure     Social History     Tobacco Use   Smoking Status Never Smoker   Smokeless Tobacco Never Used     Family History   Problem Relation Age of Onset     Emphysema Mother      Heart disease Mother      Peripheral vascular disease Father      Stroke Father      Heart disease Father      Heart disease Sister      Hypertension Sister      Past Surgical History:   Procedure Laterality Date     CHOLECYSTECTOMY  2012     COLON SURGERY  08/2013    for cecal volvulus-hemicolectomy     DILATION AND CURETTAGE OF UTERUS      x2     EXPLORATORY LAPAROTOMY N/A 10/19/2017    Procedure: LAPAROTOMY LYSIS OF ADHESIONS;  Surgeon: Terry Luna DO;  Location: [facility];  Service:      ILEOSTOMY  9/2013     RI CLOSE ENTEROSTOMY N/A 9/24/2014    Procedure: TAKEDOWN ILEOSTOMY;  Surgeon: Joseph VILLAGRAN MD;  Location: [facility];  Service: General     VITALS:  Vitals:    06/07/19 1000   BP: 124/66   Patient Site: Left Arm   Patient Position: Sitting   Cuff Size: Adult Regular   Pulse: 72   Resp: 16   Weight: 123 lb 6 oz (56 kg)     Wt Readings from Last 3 Encounters:   06/07/19 123 lb 6 oz (56 kg)   03/13/19 121 lb (54.9 kg)   12/12/18  118 lb 8 oz (53.8 kg)     PHYSICAL EXAM:  Constitutional:  In NAD, alert and oriented  Neck: no significant cervical or axillary adenopathy, thyroid normal, carotid arteries feel normal, and no bruit is heard  Cardiac:  S1 S2 without murmur  Lungs: Clear to auscultation  Skin;  Areas of dryiness eczema, without indication of tinea, on shoulders, arms.     DECISION TO OBTAIN OLD RECORDS AND/OR OBTAIN HISTORY FROM SOMEONE OTHER THAN PATIENT, AND/OR ACCESSING CARE EVERYWHERE):  1 0     REVIEW AND SUMMARIZATION OF OLD RECORDS, AND/OR OBTAINING HISTORY FROM SOMEONE OTHER THAN PATIENT, AND/OR DISCUSSION OF CASE WITH ANOTHER HEALTH CARE PROVIDER:  2 reviewed past evaluations about her anemia, and GI conditions, and hospitalizations for SBO, and Volvulus    REVIEW AND/OR ORDER OF OF CLINICAL LAB TESTS: 1  Reviewed past anemia studies.    REVIEW AND/OR ORDER OF RADIOLOGY TESTS: 1 reviewed past DXA.    REVIEW AND/OR ORDER OF MEDICAL TESTS (EKG/ECHO/COLONOSCOPY/EGD): 1  0    INDEPENDENT  VISUALIZATION OF IMAGE, TRACING, OR SPECIMEN ITSELF (2 EACH):  0    TOTAL: 4    Current Outpatient Medications   Medication Sig Dispense Refill     amLODIPine (NORVASC) 2.5 MG tablet Take 1 tablet (2.5 mg total) by mouth 2 (two) times a day. 60 tablet 11     aspirin 81 MG EC tablet Take 81 mg by mouth. Taking every other day.             azelastine (ASTELIN) 137 mcg (0.1 %) nasal spray One spray  in each nostril twice a day 30 mL 12     calcium carb &cit-vit D3 (CITRACAL + D SLOW RELEASE) 600 mg calcium- 500 unit TbER Take 600 mg by mouth daily.              carboxymethylcellulose (REFRESH PLUS) 0.5 % Dpet ophthalmic dropperette        chlorpheniramine/dextromethorp (ROBITUSSIN COUGH & COLD ORAL) Take by mouth.       hydrocortisone (CORTISONE, HYDROCORTISONE,) 1 % ointment Apply topically 2 (two) times a day.       losartan (COZAAR) 25 MG tablet Take 1 tablet (25 mg total) by mouth daily. 90 tablet 2     omeprazole (PRILOSEC) 20 MG capsule         therapeutic multivitamin (THERAGRAN) tablet Take 1 tablet by mouth daily.       UNABLE TO FIND Med Name:Fungi-nail liquid- Apply twice a day       UNABLE TO FIND Med Name:Formula 3, apply twice a day       PROPYLENE GLYCOL//PF (SYSTANE, PF, OPHT) Apply 2 drops to eye as needed. Both eyes        triamcinolone (KENALOG) 0.1 % cream Apply three times a day prn to body eczema 30 g 5     No current facility-administered medications for this visit.      Shamir Fajardo MD  Internal Medicine  [facility]

## 2021-05-29 NOTE — PROGRESS NOTES
"Mental Health Visit Note    6/24/2019     Start time: 10:00    Stop Time: 11:00  Session # 6    Ora Og is a 78 y.o. female is being seen today for a follow-up individual psychotherapy appointment    Chief Complaint   Patient presents with     Anxiety     pt feeling overwhelmed by family obligations    .     New symptoms or complaints:  none    Functional Impairment:   The patient identifies the how daily stressors affect daily functioning in today's session as follows (Scale is 1-4, 1=not at all or rarely; 4=extremely so/everyday.)    Personal: 2  Family: 2  Social: 2  Work: retired    Clinical assessment of mental status:   Ora Og presented on time.  No changes since last session 6/10/2019.  She was oriented x3, open and cooperative, and dressed appropriately for this session and weather. Her memory was Normal cognitive functioning .  Her speech was  Within normal.  Language was talkative, spontaneous, normal.  Concentration and focus is Within normal. Psychosis is not noted or reported. She reports her mood is Congruent w/content of speech.  Affect is congruent with speech and is Congruent w/content of speech.  Fund of knowledge is adequate. Insight is adequate for therapy.      Suicidal/Homicidal Ideation present:   No,  Patient denies suicidal and homicidal ideations/means or plans.      Patient's impression of their current status:  Pt reported cont ability to speak assertively and is noting improvement in lives of others as she has \"let go\" of enabling them. She met with son in [facility] and that went well because she is letting him be himself and not communicating judgment or concerns. She has improved in \"letting go\" and is letting others take care of selves. Too busy with daughter's group home conflicts but sees that as temporary.  She reports cares less about what others think and is more authentic and assertive. Pt finding therapy helpful in finding her voice and strengthening confidence. " "Reviewed tx plan and progress and pt agreed to tx goals and methods. She is letting go of judgments toward others who are not devout catholics and accepting childrens divorces (3)    Therapist impression of patient's current state:   Ora Og cont to present with less depression and anxiety and improved health. Pt gaining confidence and authenticity through greater assertiveness and \"letting go\".  Used MI to discuss assisting others and boundaries to asssist pt gain insight into wants and neeeds. Will continue to discuss ways of using that maternal part of self to nurture and express and set boundaries for self using AIR competency based interventions.     Type of psychotherapeutic technique provided:   Insight oriented, Client centered and Solution-focused. AIR    Progress toward short term goals: attended  session, setting boundaries, involved with family, not personalizing other peoples problems    Review of long term goals: to improve health and avoid enabling of others, to continue to focus on pursuing meaningful activities, improve conscious contact with higher power, put self first without guilt. Maintain effective boundaries. Date of tx plan review: 3/29/19. Date of next tx plan review: 6/29/19.  .    Diagnosis:   1. Adjustment disorder with anxious mood    2. BREN (generalized anxiety disorder)    Improving     Plan and Follow-up:   Patient will follow safety plan of seeking help from friend/family, calling [facility], calling 911, and/or presenting to the ED if necessary.  Patient will be compliant with medications and attend appointments as scheduled.  Follow recommendations of medical providers  Continue to spend time in fulfilling activities.  Avoid judging others    Discharge Criteria/Planning: Patient will continue with follow-up until therapy can be discontinued without return of signs and symptoms.    Signatures:   Performed and documented by Wali Carrion, TABBY, LICSW, LADC          "

## 2021-05-29 NOTE — PROGRESS NOTES
"Mental Health Visit Note    6/10/2019     Start time: 10:00    Stop Time: 11:00  Session # 5    Ora Og is a 78 y.o. female is being seen today for a follow-up individual psychotherapy appointment    Chief Complaint   Patient presents with      Follow Up     pt concerned for daughter who may be d/c from group home     Anxiety    .     New symptoms or complaints:  none    Functional Impairment:   The patient identifies the how daily stressors affect daily functioning in today's session as follows (Scale is 1-4, 1=not at all or rarely; 4=extremely so/everyday.)    Personal: 2  Family: 2  Social: 2  Work: retired    Clinical assessment of mental status:   Ora Og presented on time.  No changes since last session 5/10/2019.  She was oriented x3, open and cooperative, and dressed appropriately for this session and weather. Her memory was Normal cognitive functioning .  Her speech was  Within normal.  Language was talkative, spontaneous, normal.  Concentration and focus is Within normal. Psychosis is not noted or reported. She reports her mood is Congruent w/content of speech.  Affect is congruent with speech and is Congruent w/content of speech.  Fund of knowledge is adequate. Insight is adequate for therapy.      Suicidal/Homicidal Ideation present:   No,  Patient denies suicidal and homicidal ideations/means or plans.      Patient's impression of their current status:  Pt reported cont ability to speak assertively and is noting improvement in lives of others as she has \"let go\" of enabling them. She notes one son has found new job, another remains sober, daughter in group home is having behav probs but staff is addressing. She will meet with one son for first time in over a year this weekend and practiced ways of maint boundaries with him if he should ask for help. She reports cares less about what others think and is more authentic and assertive. Pt finding therapy helpful in finding her voice and " "strengthening confidence. Reviewed tx plan and progress and pt agreed to tx goals and methods. She is letting go of judgments toward others who are not devout catholics and accepting childrens divorces (3)    Therapist impression of patient's current state:   Ora Og cont to present with less depression and anxiety and improved health. Pt gaining confidence and authenticity through greater assertiveness and \"letting go\".  Used MI to discuss assisting others and boundaries to asssist pt gain insight into wants and neeeds. Will continue to discuss ways of using that maternal part of self to nurture and express and set boundaries for self using AIR competency based interventions.     Type of psychotherapeutic technique provided:   Insight oriented, Client centered and Solution-focused. AIR    Progress toward short term goals: attended  session, setting boundaries, involved with family, not personalizing other peoples problems    Review of long term goals: to improve health and avoid enabling of others, to continue to focus on pursuing meaningful activities, improve conscious contact with higher power, put self first without guilt. Maintain effective boundaries. Date of tx plan review: 3/29/19. Date of next tx plan review: 6/29/19.  .    Diagnosis:   1. Adjustment disorder with anxious mood    2. BREN (generalized anxiety disorder)    Improving     Plan and Follow-up:   Patient will follow safety plan of seeking help from friend/family, calling [facility] crisis, calling 911, and/or presenting to the ED if necessary.  Patient will be compliant with medications and attend appointments as scheduled.  Follow recommendations of medical providers  Continue to spend time in fulfilling activities.  Avoid judging others    Discharge Criteria/Planning: Patient will continue with follow-up until therapy can be discontinued without return of signs and symptoms.    Signatures:   Performed and documented by TABBY Mancini, " BRETT, [facility]

## 2021-05-29 NOTE — PATIENT INSTRUCTIONS - HE
1. Use 1% hydrocortisone cream to face as needed for eczema.     2. Use 0.1% triamcinolone cream to body eczema.     3. Reassured about the pulse noise in the right ear.    4. Check hemoglobin today    5 Follow up Dr. Diaz in fall as previously arranged.

## 2021-05-30 VITALS — HEIGHT: 62 IN | WEIGHT: 123 LBS | BODY MASS INDEX: 22.63 KG/M2

## 2021-05-30 NOTE — PROGRESS NOTES
"Mental Health Visit Note    7/19/2019   Start time: 11:00    Stop Time: 12:00  Session # 7    Ora Og is a 78 y.o. female is being seen today for a follow-up individual psychotherapy appointment    Chief Complaint   Patient presents with      Follow Up     pt feeling very busy and worried about one son and daughter's arrival from [facility]     Anxiety     Depression    .     New symptoms or complaints:  none    Functional Impairment:   The patient identifies the how daily stressors affect daily functioning in today's session as follows (Scale is 1-4, 1=not at all or rarely; 4=extremely so/everyday.)    Personal: 2  Family: 2  Social: 2  Work: retired    Clinical assessment of mental status:   Ora Og presented on time.  No changes since last session 6/24/2019.  She was oriented x3, open and cooperative, and dressed appropriately for this session and weather. Her memory was Normal cognitive functioning .  Her speech was  Within normal.  Language was talkative, spontaneous, normal.  Concentration and focus is Within normal. Psychosis is not noted or reported. She reports her mood is Congruent w/content of speech.  Affect is congruent with speech and is Congruent w/content of speech.  Fund of knowledge is adequate. Insight is adequate for therapy.      Suicidal/Homicidal Ideation present:   No,  Patient denies suicidal and homicidal ideations/means or plans.      Patient's impression of their current status:  Pt reported cont ability to speak assertively and is noting improvement in lives of others as she has \"let go\" of enabling them. She has some increased anxiety as evidenced by score of 12 on BREN-7 due to disabled daughter's acting out behaviors but is working with  housing staff to work through. She also wondered how to cont to communicate with son in [facility] and discussed strategies. He cont to make progress. She has some concerns for 's health and family members expectations for get togethers " "but is turning over fears to her HP. She reports cares less about what others think and is more authentic and assertive. Pt finding therapy helpful in finding her voice and strengthening confidence.  She continues letting go of judgments toward others who are not devout catholics and accepting childrens divorces (3). Updated, reviewed and signed tx plan.     Therapist impression of patient's current state:   Ora Og cont to present with less depression and anxiety and improved health. Pt gaining confidence and authenticity through greater assertiveness and \"letting go\".  Used MI to discuss assisting others and boundaries to asssist pt gain insight into wants and neeeds. Will continue to discuss ways of using that maternal part of self to nurture and express and set boundaries for self using AIR competency based interventions. Updated, reviewed and signed tx plan.     Type of psychotherapeutic technique provided:   Insight oriented, Client centered and Solution-focused. AIR    Progress toward short term goals: attended  session, setting boundaries, involved with family, not personalizing other peoples problems    Review of long term goals: to improve health and avoid enabling of others, to continue to focus on pursuing meaningful activities, improve conscious contact with higher power, put self first without guilt. Maintain effective boundaries. Date of tx plan review: 7/19/2019. Date of next tx plan review: 10/19/19.  .    Diagnosis:   1. Adjustment disorder with anxious mood    2. BREN (generalized anxiety disorder)    Improving     Plan and Follow-up:   Patient will follow safety plan of seeking help from friend/family, calling [facility] crisis, calling 911, and/or presenting to the ED if necessary.  Patient will be compliant with medications and attend appointments as scheduled.  Follow recommendations of medical providers  Continue to spend time in fulfilling activities.  Avoid judging others    Discharge " Criteria/Planning: Patient will continue with follow-up until therapy can be discontinued without return of signs and symptoms.    Signatures:   Performed and documented by Wali Carrion, TABBY, LICCHANG, KISHANC

## 2021-05-30 NOTE — PROGRESS NOTES
"Outpatient Mental Health Treatment Plan      Name:  Ora Og  :  1940  MRN:  [id number]      Treatment Plan:  Updated Treatment Plan  Intake/initial treatment plan date:  2017      Benefit and risks and alternatives have been discussed: Yes  Is this treatment appropriate with minimal intrusion/restrictions: Yes  Estimated duration of treatment:  Trial of 30 sessions, to be reviewed.  Anticipated frequency of services:  Every 3 weeks  Necessity for frequency: This frequency is needed to maintain progress with therapeutic goals and for continuity of care in order to monitor progress.  Necessity for treatment: To address cognitive, behavioral, and/or emotional barriers in order to work toward goals and to improve quality of life.          Plan:                                                                                                                                           ?                        ? Anxiety                                            Goal:              Decrease average anxiety level from 5 to 4.                        Strategies:                 ? [x]Continue to practice relaxation techniques and other coping                                                                       strategies (e.g., thought stopping, reframing, meditation)                                                                    ? [x] Increase involvement in activities that bring you pleasure                                                                    ? [x] Practice healthy boundaries by saying \"no\" to requests you don't feel comfortable taking on                                                                    ? [x] Continue to explore thoughts and expectations about self and others                                                                    ? [x] Identify and monitor triggers for panic/anxiety symptoms                                                                    ? [x] Implement " physical activity routine (with physician approval)                                                                    ? [x] Practice meditation and prayer on daily basis to increase connection with caring higher power                                                                    ? [x] Continue compliance with medical treatment plan (or explore                                                                    barriers)                              Degree to which this is a problem (1-4): 2   Degree to which goal is met (1-4) 2  Date of Review: 7/19/2019              Adjustment to change in family roles                                     Goal:              Increase % acceptance from 80 to 90.                        Strategies:                 ?[x]  Explore thoughts and expectations about self and others                                                         [x] Cont to explore emotional reactions to alcohol-related problems                                                                    ?[x] Learn and practice relaxation techniques and other coping                                                                       strategies                                                                    [x] Implement physical activity routine (with physician approval)                                                          [x] Continue involvement in community activities of your choosing                                                          [x] Engage in values clarification and goal-setting with self and therapist by completing assignments related to values and goals                                                                    [x] Make efforts to feel confident putting your needs first when appropriate          Date of Review:7/19/2019          Functional Impairment: 1=Not at all/Rarely  2=Some days  3=Most Days  4=Every Day   Personal: 1  Family: 2  Social: 1  Work: 0      Diagnosis:  BREN, Adjustment  disorder with mixed anxiety and depressed mood      WHODAS 2.0 12-item version= 2  H1= 5  H2= 0  H3= 1  Clinical assessments completed: BREN-7, PHQ-9, Mood Questionnaire current, CAGE-AID, CSSRI      BREN-7: 12  PHQ-9: 9      Strengths:  Intelligent, caring,healthy, active, good listener, strong desire to find solutions, loving , involved with social network  Limitations:  Severe anxiety, depression, limited ability to change others  Cultural Considerations:  Ora  is a   female with self determination to feel better. She  uses Western medicine and has a strong Anglican  [place] . She  has health insurance is able to navigate the system.          Persons responsible for this plan:  ? [x] Patient                     ? [x] Provider                   ? [] Other: __________________          Provider:Performed and documented by MARGARITA Whitley; [facility] 7/19/2019                 Date: 7/19/2019             Time:  10:00AM              Patient Signature:____________________________________ Date: ______________           Therapist Signature: __________________________________ Date: ______________

## 2021-05-31 VITALS — WEIGHT: 119 LBS | BODY MASS INDEX: 21.77 KG/M2

## 2021-05-31 VITALS — HEIGHT: 62 IN | BODY MASS INDEX: 21.19 KG/M2 | WEIGHT: 115.13 LBS

## 2021-05-31 VITALS — HEIGHT: 62 IN | BODY MASS INDEX: 22.92 KG/M2 | WEIGHT: 124.56 LBS

## 2021-05-31 VITALS — HEIGHT: 62 IN | WEIGHT: 113.2 LBS | BODY MASS INDEX: 20.83 KG/M2

## 2021-05-31 VITALS — WEIGHT: 112.4 LBS | HEIGHT: 62 IN | BODY MASS INDEX: 20.68 KG/M2

## 2021-05-31 VITALS — BODY MASS INDEX: 19.77 KG/M2 | HEIGHT: 62 IN | WEIGHT: 107.4 LBS

## 2021-05-31 NOTE — PROGRESS NOTES
Mental Health Visit Note    8/23/2019  Start time: 10:00    Stop Time: 11:00  Session # 8    Ora Og is a 78 y.o. female is being seen today for a follow-up individual psychotherapy appointment    Chief Complaint   Patient presents with      Follow Up     pt anxious over daughter's behavioral problems     Depression     Anxiety     Grief/loss    .     New symptoms or complaints:  none    Functional Impairment:   The patient identifies the how daily stressors affect daily functioning in today's session as follows (Scale is 1-4, 1=not at all or rarely; 4=extremely so/everyday.)    Personal: 2  Family: 2  Social: 2  Work: retired    Clinical assessment of mental status:   Ora Og presented on time.  No changes since last session 7/19/2019.  She was oriented x3, open and cooperative, and dressed appropriately for this session and weather. Her memory was Normal cognitive functioning .  Her speech was  Within normal.  Language was talkative, spontaneous, normal.  Concentration and focus is Within normal. Psychosis is not noted or reported. She reports her mood is Congruent w/content of speech.  Affect is congruent with speech and is Congruent w/content of speech.  Fund of knowledge is adequate. Insight is adequate for therapy.      Suicidal/Homicidal Ideation present:   No,  Patient denies suicidal and homicidal ideations/means or plans.      Patient's impression of their current status:  Pt reported increased anxiety and stress triggered by daughter's behavioral issues which has led to her being DC'd from a group home where she was getting professional care. Daughter is now living with pt and her  and requires a lot of attention and assistance. Pt is giving daughter rides to day treatment several times per week and is also having to keep daughter busy at home. Caring for daughter exhausting and pt feels overwhelmed. She is working with  to secure group housing for daughter ASAP. She is  "using serenity prayer and seeking help from others to assist her during this time.  Pt finding therapy helpful in finding her voice and strengthening confidence.  She continues letting go of judgments toward others..     Therapist impression of patient's current state:   Ora Og cont to present with less depression but increased anxiety related to added stressors. She is working to address stressors with help of others.  Pt gaining confidence and authenticity through greater assertiveness and \"letting go\".  Used MI to discuss assisting others and boundaries to asssist pt gain insight into wants and neeeds. Will continue to discuss ways of using that maternal part of self to nurture and express and set boundaries for self using AIR competency based interventions.     Type of psychotherapeutic technique provided:   Insight oriented, Client centered and Solution-focused. AIR    Progress toward short term goals: attended  session, setting boundaries, involved with family, not personalizing other peoples problems    Review of long term goals: to improve health and avoid enabling of others, to continue to focus on pursuing meaningful activities, improve conscious contact with higher power, put self first without guilt. Maintain effective boundaries. Date of tx plan review: 7/19/2019. Date of next tx plan review: 10/19/19.  .    Diagnosis:   1. Adjustment disorder with anxious mood    2. BREN (generalized anxiety disorder)    Improving     Plan and Follow-up:   Patient will follow safety plan of seeking help from friend/family, calling [facility] crisis, calling 911, and/or presenting to the ED if necessary.  Patient will be compliant with medications and attend appointments as scheduled.  Follow recommendations of medical providers  Continue to spend time in fulfilling activities.  Work with  to secure alt housing for daughter    Discharge Criteria/Planning: Patient will continue with follow-up until therapy can be " discontinued without return of signs and symptoms.    Signatures:   Performed and documented by Wali Carrion, TABBY, LICSW, LADC

## 2021-06-01 VITALS — BODY MASS INDEX: 21.53 KG/M2 | WEIGHT: 117 LBS | HEIGHT: 62 IN

## 2021-06-01 VITALS — HEIGHT: 62 IN | BODY MASS INDEX: 21.08 KG/M2 | WEIGHT: 114.56 LBS

## 2021-06-01 VITALS — HEIGHT: 62 IN | BODY MASS INDEX: 21.71 KG/M2 | WEIGHT: 118 LBS
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
## 2021-06-02 VITALS — BODY MASS INDEX: 21.81 KG/M2 | WEIGHT: 118.5 LBS | HEIGHT: 62 IN

## 2021-06-02 VITALS — WEIGHT: 121 LBS | HEIGHT: 61 IN | BODY MASS INDEX: 22.84 KG/M2

## 2021-06-02 NOTE — PROGRESS NOTES
"Outpatient Mental Health Treatment Plan      Name:  Ora Og  :  1940  MRN:  [id number]      Treatment Plan:  Updated Treatment Plan  Intake/initial treatment plan date:  2017      Benefit and risks and alternatives have been discussed: Yes  Is this treatment appropriate with minimal intrusion/restrictions: Yes  Estimated duration of treatment:  Trial of 30 sessions, to be reviewed.  Anticipated frequency of services:  Every 4 weeks  Necessity for frequency: This frequency is needed to maintain progress with therapeutic goals and for continuity of care in order to monitor progress.  Necessity for treatment: To address cognitive, behavioral, and/or emotional barriers in order to work toward goals and to improve quality of life.          Plan:                                                                                                                                           ?                        ? Anxiety                                            Goal:              Decrease average anxiety level from 4 to 3.                        Strategies:                 ? [x]?Continue to practice relaxation techniques and other coping                                                                       strategies (e.g., thought stopping, reframing, meditation)                                                                    ? [x]? Increase involvement in activities that bring you pleasure                                                                    ? [x]? Practice healthy boundaries by saying \"no\" to requests you don't feel comfortable taking on                                                                    ? [x]? Continue to explore thoughts and expectations about self and others                                                                    ? [x]? Identify and monitor triggers for panic/anxiety " symptoms                                                                    ? [x]? Implement physical activity routine (with physician approval)                                                                    ? [x]? Practice meditation and prayer on daily basis to increase connection with caring higher power                                                                    ? [x]? Continue compliance with medical treatment plan (or explore                                                                    barriers)                              Degree to which this is a problem (1-4): 1   Degree to which goal is met (1-4) 4  Date of Review: 7/19/2019               Adjustment to change in family roles                                     Goal:              Increase % acceptance from 90 to 95.                        Strategies:                 ?[x]?  Explore thoughts and expectations about self and others                                                         [x]? Cont to explore emotional reactions to alcohol-related problems                                                                    ?[x]? Learn and practice relaxation techniques and other coping                                                                       strategies                                                                    [x]? Implement physical activity routine (with physician approval)                                                          [x]? Continue involvement in community activities of your choosing                                                          [x]? Engage in values clarification and goal-setting with self and therapist by completing assignments related to values and goals                                                                    [x]? Make efforts to feel confident putting your needs first when appropriate          Date of Review:10/11/2019             Functional Impairment: 1=Not at all/Rarely  2=Some days   3=Most Days  4=Every Day   Personal: 1  Family: 1  Social: 1  Work: 0      Diagnosis:  BRNE, Adjustment disorder with mixed anxiety and depressed mood      WHODAS 2.0 12-item version= 2  H1= 5  H2= 0  H3= 0  Clinical assessments completed: BREN-7, PHQ-9, Mood Questionnaire current, CAGE-AID, CSSRI      BREN-7: 6  PHQ-9: 2      Strengths:  Intelligent, caring,healthy, active, good listener, strong desire to find solutions, loving , involved with social network  Limitations:  Severe anxiety, depression, limited ability to change others  Cultural Considerations:  Ora  is a   female with self determination to feel better. She  uses Western medicine and has a strong Christian  [place] . She  has health insurance is able to navigate the system.          Persons responsible for this plan:  ? [x]? Patient                     ? [x]? Provider                   ? []? Other: __________________          Provider:Performed and documented by MARGARITA Whitley; [facility] 10/11/2019                      Date: 10/11/2019                Time:  10:00AM              Patient Signature:____________________________________ Date: ______________           Therapist Signature: __________________________________ Date: ______________

## 2021-06-02 NOTE — PROGRESS NOTES
Mental Health Visit Note    10/11/2019    Start time: 10:00    Stop Time: 11:00  Session # 10    Ora Og is a 79 y.o. female is being seen today for a follow-up individual psychotherapy appointment    Chief Complaint   Patient presents with      Follow Up     stress related to children's needs and 's cognitive decline     Anxiety    .     New symptoms or complaints:  none    Functional Impairment:   The patient identifies the how daily stressors affect daily functioning in today's session as follows (Scale is 1-4, 1=not at all or rarely; 4=extremely so/everyday.)    Personal: 2  Family: 2  Social: 2  Work: retired    Clinical assessment of mental status:   Ora Og presented on time.  No changes since last session 9/10/2019.  She was oriented x3, open and cooperative, and dressed appropriately for this session and weather. Her memory was Normal cognitive functioning .  Her speech was  Within normal.  Language was talkative, spontaneous, normal.  Concentration and focus is Within normal. Psychosis is not noted or reported. She reports her mood is Congruent w/content of speech.  Affect is congruent with speech and is Congruent w/content of speech.  Fund of knowledge is adequate. Insight is adequate for therapy.      Suicidal/Homicidal Ideation present:   No,  Patient denies suicidal and homicidal ideations/means or plans.      Patient's impression of their current status:  Pt reported anxiety and stress triggered by concerns over husbands medical and cognitive problems. His memory is becoming more impaired. His eyesight and balance are compromised. She is taking on new respeonsibilities and finds that empowering but she is concerned about how she will handle his needs in the future. She will put herself and  on wait list for assisted living with [facility]. She is active, reports improved relationships with family members, exploring past trauma with sister and gaining insight into neg impact  "her alcoholic parents had upon her growing up. Notes mother so critical she believed she was incompetent and leanred to defer to others and never developed her own voice. She has become more assertive and confident and at the same time better able to \"live and let live\". Pt has secured group housing for daughter. We reviewed and updated tx plan and she wishes to cont therapy because it helps her to be more assertive, builds her confidence, and allows her to check in on decision making.  She worries that without therapy she will back-slide or lose confidence and make mistakes in dealing with self and others.  She continues letting go of judgments toward others..     Therapist impression of patient's current state:   Ora Og cont to present with less depression and greater confidence and assertiveness. She is working on checking self doubt and is better able to \"be myself\".  She continues working to address stressors with help of others.  Pt gaining confidence and authenticity through greater assertiveness and \"letting go\".  Used MI to discuss assisting others and boundaries to asssist pt gain insight into wants and neeeds. Will continue to discuss ways of using that maternal part of self to nurture and express and set boundaries for self using AIR competency based interventions. Updated and reviewed tx plan.    Type of psychotherapeutic technique provided:   Insight oriented, Client centered and Solution-focused. AIR    Progress toward short term goals: attended  session, setting boundaries, involved with family, not personalizing other peoples problems    Review of long term goals: to improve health and avoid enabling of others, to continue to focus on pursuing meaningful activities, improve conscious contact with higher power, put self first without guilt. Maintain effective boundaries. Date of tx plan review: 10/11/2019. Date of next tx plan review: 1/11/2020  .    Diagnosis:   1. Adjustment disorder with " anxious mood    2. BREN (generalized anxiety disorder)    Improving     Plan and Follow-up:   Patient will follow safety plan of seeking help from friend/family, calling [facility] crisis, calling 911, and/or presenting to the ED if necessary.  Patient will be compliant with medications and attend appointments as scheduled.  Follow recommendations of medical providers  Continue to spend time in fulfilling activities.  Work with  to secure alt housing for daughter    Discharge Criteria/Planning: Patient will continue with follow-up until therapy can be discontinued without return of signs and symptoms.    Signatures:   Performed and documented by Wali Carrion, TABBY, LICSW, LADC

## 2021-06-03 VITALS — WEIGHT: 123.38 LBS | BODY MASS INDEX: 23.31 KG/M2

## 2021-06-03 NOTE — PROGRESS NOTES
"Mental Health Visit Note    11/15/2019    Start time: 10:00    Stop Time: 11:00  Session # 11    Ora Og is a 79 y.o. female is being seen today for a follow-up individual psychotherapy appointment    Chief Complaint   Patient presents with      Follow Up     pt dealing more effectively with life stressors     Depression     Anxiety    .     New symptoms or complaints:  none    Functional Impairment:   The patient identifies the how daily stressors affect daily functioning in today's session as follows (Scale is 1-4, 1=not at all or rarely; 4=extremely so/everyday.)    Personal: 2  Family: 2  Social: 2  Work: retired    Clinical assessment of mental status:   Ora Og presented on time.  No changes since last session 10/11/2019.  She was oriented x3, open and cooperative, and dressed appropriately for this session and weather. Her memory was Normal cognitive functioning .  Her speech was  Within normal.  Language was talkative, spontaneous, normal.  Concentration and focus is Within normal. Psychosis is not noted or reported. She reports her mood is Congruent w/content of speech.  Affect is congruent with speech and is Congruent w/content of speech.  Fund of knowledge is adequate. Insight is adequate for therapy.      Suicidal/Homicidal Ideation present:   No,  Patient denies suicidal and homicidal ideations/means or plans.      Patient's impression of their current status:  Pt reported she is doing well and managing anxiety and stress in life successfully. She reports therapy has been helpful but is no longer necessary as she feels she has developed a new sense of confidence, acceptance and assertiveness that allow her to deal with concerns without need for therapy at this time. She has become more assertive and confident and at the same time better able to \"live and let live\". Pt has secured group housing for daughter. She has decided to terminate therapy at this time because she is busy with family " "and no longer needs it to be more assertive, builds her confidence, and allows her to check in on decision making.  She is no longer worried that without therapy she will back-slide or lose confidence and make mistakes in dealing with self and others.  She continues letting go of judgments toward others..     Therapist impression of patient's current state:   Ora Og cont to present with less depression and greater confidence and assertiveness. She is working on checking self doubt and is better able to stand up for self and values while also being giving and flexible. She continues working to address stressors with help of others.  Pt gaining confidence and authenticity through greater assertiveness and \"letting go\".  Used MI to discuss assisting others and boundaries to asssist pt gain insight into wants and neeeds. She has terminated therapy at this time but will call in the future if additional sessions are needed.     Type of psychotherapeutic technique provided:   Insight oriented, Client centered and Solution-focused. AIR    Progress toward short term goals: attended  session, setting boundaries, involved with family, not personalizing other peoples problems    Review of long term goals: to improve health and avoid enabling of others, to continue to focus on pursuing meaningful activities, improve conscious contact with higher power, put self first without guilt. Maintain effective boundaries. Date of tx plan review: 10/11/2019. Date of next tx plan review at next follow up session if scheduled  .    Diagnosis:   1. Adjustment disorder with anxious mood    2. BREN (generalized anxiety disorder)    Improved     Plan and Follow-up:   Patient will follow safety plan of seeking help from friend/family, calling [facility] crisis, calling 911, and/or presenting to the ED if necessary.  Patient will be compliant with medications and attend appointments as scheduled.  Follow recommendations of medical " providers  Continue to spend time in fulfilling activities.  Pt will schedule follow up in future as needed    Discharge Criteria/Planning: Patient will continue with follow-up until therapy can be discontinued without return of signs and symptoms.    Signatures:   Performed and documented by Wali Carrion, TABBY, LICSW, LADC

## 2021-06-04 VITALS
BODY MASS INDEX: 23.27 KG/M2 | HEIGHT: 61 IN | DIASTOLIC BLOOD PRESSURE: 80 MMHG | RESPIRATION RATE: 16 BRPM | OXYGEN SATURATION: 100 % | WEIGHT: 123.25 LBS | SYSTOLIC BLOOD PRESSURE: 128 MMHG | HEART RATE: 88 BPM

## 2021-06-04 VITALS
HEART RATE: 84 BPM | SYSTOLIC BLOOD PRESSURE: 124 MMHG | OXYGEN SATURATION: 99 % | WEIGHT: 123 LBS | HEIGHT: 61 IN | BODY MASS INDEX: 23.22 KG/M2 | DIASTOLIC BLOOD PRESSURE: 70 MMHG

## 2021-06-04 NOTE — PATIENT INSTRUCTIONS - HE
1. Decrease Omeprazole to 10 mg a day, let me know if having more acid reflux symptoms.  but will need to stay on it long term due to Smith's esophagus.     2. B/p today is good.

## 2021-06-04 NOTE — PROGRESS NOTES
[facility] Follow Up Note    Assessment/Plan:    1. Essential hypertension  Pressures well controlled amlodipine and Norvasc.  Will check metabolic panel  - amLODIPine (NORVASC) 2.5 MG tablet; Take 1 tablet (2.5 mg total) by mouth 2 (two) times a day.  Dispense: 180 tablet; Refill: 3  - losartan (COZAAR) 25 MG tablet; Take 1 tablet (25 mg total) by mouth daily.  Dispense: 90 tablet; Refill: 3  - Basic Metabolic Panel    2. Gastroesophageal reflux disease without esophagitis and history of Smith's esophagus  She has had endoscopy in 2018 which showed short segment Smith's with no atypia.  3-year follow-up was recommended.  She is currently on omeprazole 20 mg a day and since she is asymptomatic we will try a smaller dose.  Overall discussed that she will need to be on it lifelong now.  Will check CBC today.  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  - omeprazole (PRILOSEC) 10 MG capsule; Take 1 capsule (10 mg total) by mouth daily before breakfast.  Dispense: 90 capsule; Refill: 2  - HM1(CBC and Differential)  - HM1 (CBC with Diff)      Jessica Diaz MD    Chief Complaint:  Chief Complaint   Patient presents with     Hypertension     re check BP     Medication Management     Discuss meds       History of Present Illness:  Ora is a 79 y.o. female with h/o  of mild kidney insufficiency, previous cecal volvulus requiring ileostomy, postoperative DVT and PE, cholecystectomy, history of rheumatic fever and seasonal allergies, post operative recurrent anemia (which resolved spontaneously), Chris's esophagus, .seasonal allergies.  She is currently here for follow-up.  She does not have any concerns or complaints.    She is on losartan and amlodipine for blood pressure control and is doing well on it.    She did have mild anemia in the past and history of Smith's esophagus.  She did have repeat  endoscopy done in 2018 which showed short segment of Smith's without atypia, 3-year follow-up was recommended.  She is up-to-date on colonoscopy.  She denies any acid reflux symptoms.  Discussed that most likely she will need to be on PPI lifelong but we can try a smaller dose.    Review of Systems:  A comprehensive review of systems was performed and was otherwise negative    PFSH:  Social History: Reviewed  Social History     Tobacco Use   Smoking Status Never Smoker   Smokeless Tobacco Never Used     Social History     Social History Narrative    Patient is .  She has 15 children who are all grown up.    She enjoys walking daily for exercise.        Past History: Reviewed  Current Outpatient Medications   Medication Sig Dispense Refill     amLODIPine (NORVASC) 2.5 MG tablet Take 1 tablet (2.5 mg total) by mouth 2 (two) times a day. 180 tablet 3     aspirin 81 MG EC tablet Take 81 mg by mouth. Taking every other day.             calcium carb &cit-vit D3 (CITRACAL + D SLOW RELEASE) 600 mg calcium- 500 unit TbER Take 600 mg by mouth daily.              carboxymethylcellulose (REFRESH PLUS) 0.5 % Dpet ophthalmic dropperette        chlorpheniramine/dextromethorp (ROBITUSSIN COUGH & COLD ORAL) Take by mouth.       losartan (COZAAR) 25 MG tablet Take 1 tablet (25 mg total) by mouth daily. 90 tablet 3     therapeutic multivitamin (THERAGRAN) tablet Take 1 tablet by mouth daily.       triamcinolone (KENALOG) 0.1 % cream Apply three times a day prn to body eczema 30 g 5     UNABLE TO FIND Med Name:Fungi-nail liquid- Apply twice a day       UNABLE TO FIND Med Name:Formula 3, apply twice a day       omeprazole (PRILOSEC) 10 MG capsule Take 1 capsule (10 mg total) by mouth daily before breakfast. 90 capsule 2     No current facility-administered medications for this visit.        Family History: Reviewed    Physical Exam:    Vitals:    12/05/19 1022   BP: 124/70   Patient Site: Left Arm   Patient Position: Sitting  "  Cuff Size: Adult Regular   Pulse: 84   SpO2: 99%   Weight: 123 lb (55.8 kg)   Height: 5' 1\" (1.549 m)     Wt Readings from Last 3 Encounters:   12/05/19 123 lb (55.8 kg)   06/07/19 123 lb 6 oz (56 kg)   03/13/19 121 lb (54.9 kg)     Body mass index is 23.24 kg/m .    Constitutional:  Reveals a pleasant female.  Vitals:  Per nursing notes.  HEENT:No cervical LAD, no thyromegaly,  conjunctiva is pink, no scleral icterus, TMs are visualized and normal bl, oropharynx is clear, no exudates,   Cardiac:  Regular rate and rhythm,no murmurs, rubs, or gallops. Carotids without bruits. Legs without edema. Palpation of the radial pulse regular.  Lungs: Clear to auscultation bl.  Respiratory effort normal.  Abdomen:positive BS, soft, nontender, nondistended.  No hepato-splenomagaly  Skin:   Without rash, bruise, or palpable lesions.  Rheumatologic: Normal joints and nails of the hands.  Neurologic:  Cranial nerves II-XII intact.     Psychiatric: affect appropriate, memory intact.  Mild anxiety noted    Data Review:    Analysis and Summary of Old Records (2): yes      Records Requested (1): no      Other History Summarized (from other people in the room) (2): no    Radiology Tests Summarized (XRAY/CT/MRI/DXA) (1): no    Labs Reviewed (1): yes    Medicine Tests Reviewed (EKG/ECHO/COLONOSCOPY/EGD) (1): colo,egd    Independent Review of EKG or X-RAY (2): no      "

## 2021-06-05 ENCOUNTER — RECORDS - HEALTHEAST (OUTPATIENT)
Dept: VASCULAR SURGERY | Facility: CLINIC | Age: 81
End: 2021-06-05

## 2021-06-05 DIAGNOSIS — K56.2 VOLVULUS (H): ICD-10-CM

## 2021-06-05 NOTE — PROGRESS NOTES
"Mental Health Visit Note    1/10/2020    Start time: 10:00    Stop Time: 11:00  Session # 1    Ora Og is a 79 y.o. female is being seen today for a follow-up individual psychotherapy appointment    Chief Complaint   Patient presents with      Follow Up     Anxiety     anxious over daughter's behavioral problems    .     New symptoms or complaints:  Daughter having problems at group home    Functional Impairment:   The patient identifies the how daily stressors affect daily functioning in today's session as follows (Scale is 1-4, 1=not at all or rarely; 4=extremely so/everyday.)    Personal: 2  Family: 2  Social: 2  Work: retired    Clinical assessment of mental status:   Ora Og presented on time.  No changes since last session 11/15/2019.  She was oriented x3, open and cooperative, and dressed appropriately for this session and weather. Her memory was Normal cognitive functioning .  Her speech was  Within normal.  Language was talkative, spontaneous, normal.  Concentration and focus is Within normal. Psychosis is not noted or reported. She reports her mood is Congruent w/content of speech.  Affect is congruent with speech and is Congruent w/content of speech.  Fund of knowledge is adequate. Insight is adequate for therapy.      Suicidal/Homicidal Ideation present:   No,  Patient denies suicidal and homicidal ideations/means or plans.      Patient's impression of their current status:  Pt reported some anxiety over daughter's problems acting out at group home. She processed her concerns and gained some acceptance and relief that she is handling those problems well. She notes due to daughters DD she has harder time \"detaching with love\" than with other children. However, over all, she is doing well and managing anxiety and stress in life successfully. She reports therapy has been helpful and needed to return to talk about problems and stressors in ways she can't communicate outside of therapy. She " "reported at end of session therapy has been and cont to be incredibly helpful. She is tired of always caring for others vs putting self first. She is doing things for self even when she feels guilty doing so. She has pulled back from some volunteer activities due to fatigue and realizes others can take on commitments she formerly felt responsible for.   .     Therapist impression of patient's current state:   Ora Og cont to present with less depression and greater confidence and assertiveness. She has trouble not taking on responsibilities for others because doing so makes her feel involved, needed, and alive. She notes that being more relaxed is good but brings up fear of decline and death. She shared dreams of feeling trapped that had a death component. She is working on checking self doubt and is better able to stand up for self and values while also being giving and flexible. She continues working to address stressors with help of others.  Pt gaining confidence and authenticity through greater assertiveness and \"letting go\".  Used MI to discuss assisting others and boundaries to asssist pt gain insight into wants and neeeds. She has terminated therapy at this time but will call in the future if additional sessions are needed.     Type of psychotherapeutic technique provided:   Insight oriented, Client centered and Solution-focused. AIR    Progress toward short term goals: attended  session, setting boundaries, involved with family, not personalizing other peoples problems    Review of long term goals: to improve health and avoid enabling of others, to continue to focus on pursuing meaningful activities, improve conscious contact with higher power, put self first without guilt. Maintain effective boundaries. Date of tx plan review: 10/11/2019. Date of next tx plan review at next follow up session  .    Diagnosis:   1. Adjustment disorder with anxious mood    2. BREN (generalized anxiety disorder)  "   Improved     Plan and Follow-up:   Patient will follow safety plan of seeking help from friend/family, calling [facility] crisis, calling 911, and/or presenting to the ED if necessary.  Patient will be compliant with medications and attend appointments as scheduled.  Follow recommendations of medical providers  Continue to spend time in fulfilling activities.  Pt will schedule follow up in future as needed    Discharge Criteria/Planning: Patient will continue with follow-up until therapy can be discontinued without return of signs and symptoms.    Signatures:   Performed and documented by Wali Carrion, TABBY, LICSW, LADC

## 2021-06-06 NOTE — PROGRESS NOTES
"Outpatient Mental Health Treatment Plan      Name:  Ora Og  :  1940  MRN:  [id number]      Treatment Plan:  Updated Treatment Plan  Intake/initial treatment plan date:  2017      Benefit and risks and alternatives have been discussed: Yes  Is this treatment appropriate with minimal intrusion/restrictions: Yes  Estimated duration of treatment:  Trial of 30 sessions, to be reviewed.  Anticipated frequency of services:  Every 4 weeks  Necessity for frequency: This frequency is needed to maintain progress with therapeutic goals and for continuity of care in order to monitor progress.  Necessity for treatment: To address cognitive, behavioral, and/or emotional barriers in order to work toward goals and to improve quality of life.          Plan:                                                                                                                                           ?                        ? Anxiety                                            Goal:              Decrease average anxiety level from 3 to 2.                        Strategies:                 ? [x]??Continue to practice relaxation techniques and other coping                                                                       strategies (e.g., thought stopping, reframing, meditation)                                                                    ? [x]?? Increase involvement in activities that bring you pleasure                                                                    ? [x]?? Practice healthy boundaries by saying \"no\" to requests you don't feel comfortable taking on                                                                    ? [x]?? Continue to explore thoughts and expectations about self and others                                                                    ? [x]?? Identify and monitor triggers for panic/anxiety " symptoms                                                                    ? [x]?? Implement physical activity routine (with physician approval)                                                                    ? [x]?? Practice meditation and prayer on daily basis to increase connection with caring higher power                                                                    ? [x]?? Continue compliance with medical treatment plan (or explore                                                                    barriers)                              Degree to which this is a problem (1-4): 1   Degree to which goal is met (1-4) 4  Date of Review: 2/21/2020                 Adjustment to change in family roles                                     Goal:              Increase % acceptance from 90 to 95.                        Strategies:                 ?[x]??  Explore thoughts and expectations about self and others                                                         [x]?? Cont to explore emotional reactions to alcohol-related problems                                                                    ?[x]?? Learn and practice relaxation techniques and other coping                                                                       strategies                                                                    [x]?? Implement physical activity routine (with physician approval)                                                          [x]?? Continue involvement in community activities of your choosing                                                          [x]?? Engage in values clarification and goal-setting with self and therapist by completing assignments related to values and goals                                                                    [x]?? Make efforts to feel confident putting your needs first when appropriate          Date of Review:2/21/2020                Functional Impairment: 1=Not at all/Rarely   2=Some days  3=Most Days  4=Every Day   Personal: 1  Family: 1  Social: 1  Work: 0      Diagnosis:  BREN, Adjustment disorder with mixed anxiety and depressed mood      WHODAS 2.0 12-item version= 2  H1= 5  H2= 0  H3= 0  Clinical assessments completed: BREN-7, PHQ-9, Mood Questionnaire current, CAGE-AID, CSSRI      BREN-7: 6  PHQ-9: 2      Strengths:  Intelligent, caring,healthy, active, good listener, strong desire to find solutions, loving , involved with social network  Limitations:  Severe anxiety, depression, limited ability to change others  Cultural Considerations:  Ora  is a   female with self determination to feel better. She  uses Western medicine and has a strong Christian  [place] . She  has health insurance is able to navigate the system.          Persons responsible for this plan:  ? [x]?? Patient                     ? [x]?? Provider                   ? []?? Other: __________________          Provider:Performed and documented by MARGARITA WhitleySW; [facility] 2/21/2020                          Date: 2/21/2020                   Time:  10:00AM              Patient Signature:____________________________________ Date: ______________           Therapist Signature: __________________________________ Date: ______________

## 2021-06-06 NOTE — PROGRESS NOTES
Mental Health Visit Note    2/21/2020    Start time: 10:00    Stop Time: 11:00  Session # 2    Ora Og is a 79 y.o. female is being seen today for a follow-up individual psychotherapy appointment    Chief Complaint   Patient presents with      Follow Up     Depression     sadness over estrangement from some children     Anxiety    .     New symptoms or complaints:  none    Functional Impairment:   The patient identifies the how daily stressors affect daily functioning in today's session as follows (Scale is 1-4, 1=not at all or rarely; 4=extremely so/everyday.)    Personal: 2  Family: 2  Social: 2  Work: retired    Clinical assessment of mental status:   Ora Og presented on time.  No changes since last session 1/10/2020.  She was oriented x3, open and cooperative, and dressed appropriately for this session and weather. Her memory was Normal cognitive functioning .  Her speech was  Within normal.  Language was talkative, spontaneous, normal.  Concentration and focus is Within normal. Psychosis is not noted or reported. She reports her mood is Congruent w/content of speech.  Affect is congruent with speech and is Congruent w/content of speech.  Fund of knowledge is adequate. Insight is adequate for therapy.      Suicidal/Homicidal Ideation present:   No,  Patient denies suicidal and homicidal ideations/means or plans.      Patient's impression of their current status:  Pt reported some anxiety over continued stressors and estrangement from 2 children. She feels grateful for connections to children and grandchildren but overwhelmed at times by community activities. Letting go of some vol obligations. She continues to report at end of session therapy has been and cont to be incredibly helpful. She is putting self first. She is doing things for self even when she feels guilty doing so. She has pulled back from some volunteer activities due to fatigue and realizes others can take on commitments she  "formerly felt responsible for.   .     Therapist impression of patient's current state:   Ora Og presents with anxiety over multiple community activities and some family issues including 's health problems. She cont with help of therapy to present with less depression and greater confidence and assertiveness.  She continues working to address stressors with help of others.  Pt gaining confidence and authenticity through greater assertiveness and \"letting go\".  Used MI to discuss assisting others and boundaries to asssist pt gain insight into wants and neeeds. She has terminated therapy at this time but will call in the future if additional sessions are needed.     Type of psychotherapeutic technique provided:   Insight oriented, Client centered and Solution-focused. AIR    Progress toward short term goals: attended  session, setting boundaries, involved with family, not personalizing other peoples problems    Review of long term goals: to improve health and avoid enabling of others, to continue to focus on pursuing meaningful activities, improve conscious contact with higher power, put self first without guilt. Maintain effective boundaries. Date of tx plan review: 2/21/2020. Date of next tx plan review 5/21/2020  .    Diagnosis:   1. Adjustment disorder with anxious mood    2. BREN (generalized anxiety disorder)    Improved     Plan and Follow-up:   Patient will follow safety plan of seeking help from friend/family, calling [facility], calling 911, and/or presenting to the ED if necessary.  Patient will be compliant with medications and attend appointments as scheduled.  Follow recommendations of medical providers  Continue to spend time in fulfilling activities.      Discharge Criteria/Planning: Patient will continue with follow-up until therapy can be discontinued without return of signs and symptoms.    Signatures:   Performed and documented by Wali Carrion, TABBY, LICSW, LADC          "

## 2021-06-07 NOTE — PATIENT INSTRUCTIONS - HE
Continue practicing mindfulness exercises  Continue google hangouts with family playing games and conversing  Keep in touch with daughter in [place] via internet  Continue prayer and meditation

## 2021-06-07 NOTE — PROGRESS NOTES
"Mental Health tele Visit Note    Patient: Ora Og    : 1940 MRN: [id number]    Date: 2020     Start time: 10:00 Stop Time: 10:30   Session # 3    The patient has been notified of the following:   \"We have found that certain health care needs can be provided without the need for a face to face visit.  This service lets us provide the care you need with a phone conversation.  I will have full access to your [facility] medical record during this entire phone call.   I will be taking notes for your medical record. Since this is like an office visit, we will bill your insurance company for this service.  There are potential benefits and risks of telephone visits (e.g. limits to patient confidentiality) that differ from in-person visits.?  Confidentiality still applies for telephone services, and nobody will record the visit.  It is important to be in a quiet, private space that is free of distractions (including cell phone or other devices) during the visit.?? If during the course of the call I believe a telephone visit is not appropriate, you will not be charged for this service\"  Consent has been obtained for this service by care team member: Yes, per verbal agreement     Session Type: Patient is participating in a telephone visit pt lacks technological skills to do video visit.     Chief Complaint   Patient presents with      Follow Up     telephone visit     Anxiety     Depression     grief        New symptoms or complaints: None    Functional Impairment:   Personal: 2  Family: 2  Work: 2  Social:2          ASSESSMENT: Current Emotional / Mental Status (status of significant symptoms):              Risk status (Self / Other harm or suicidal ideation)              Patient reports safe               Patient denies current or recent suicidal ideation or behaviors.              Patient denies current or recent homicidal ideation or behaviors.              Patient denies current or recent self injurious " behavior or ideation.              Patient denies other safety concerns.                    Recommended that patient call 911 or go to the local ED should there be a change in any of these risk factors.                Attitude:                                   Cooperative               Orientation:                             All              Speech                          Rate / Production:       Normal                           Volume:                       Normal               Mood:                                      Normal              Thought Content:                    Clear               Thought Form:                        Coherent  Logical               Insight:                                     Good       Patient's impression of their current status: things going ok for pt but concerned for  who is having a lot of medical issues including mental confusion, essential tremors, poor vision, neuropathy and needs knee replacement which creates some stress. Otherwise engaging in online family activities, going for walks with children, feeling pretty content. In process of selling house and has found potential . Pt made decision to move back to [facility] and will move from senior housing to regular apt and will get additional services as necessary. JUNIQE. Pt has some concerns about cleanliness of building where she lives and  never around. Daughter JUDIT is sheltering at group home and managing well. Some grief over impact of pandemic on marginalized people     Therapist impression of patients current state: Pt is doing well, thriving during pandemic, using spirituality to accept what she can't change. Going to communion, prayer, online services, being loving and communicating with family.  Sees this as opportunity to change in positive ways.     Type of psychotherapeutic technique provided: Client centered and CBT, spirituality    Progress toward short term goals:Progress  as expected, Pt discussing concerns and coping strategies during tele-sessions. Pt working on homework assignments and skills learned in therapy between sessions    Review of long term goals: Not done at today's visit       Diagnosis:  1. Adjustment disorder with anxious mood    2. BREN (generalized anxiety disorder)      improved    Plan and Follow up: To continue to work on personal goals. To continue participating in tele-therapy until face-to-face meetings are allowed. To maintain mental health and physical health through this period of heightened health concerns related to corona virus. To remain medication compliant. To call this psychotherapist if additional psychotherapy sessions are needed due to environmental stressors.       Discharge Criteria/Planning: Patient will continue with follow-up until therapy can be discontinued without return of signs and symptoms.              I have reviewed the note as documented above.  This accurately captures the substance of my conversation with the patient.  Wali Carrion, CANDESW

## 2021-06-07 NOTE — PROGRESS NOTES
"Mental Health tele Visit Note    Patient: Ora Og    : 1940 MRN: [id number]    Date: 4/3/2020     Start time: 10:00   Stop Time: 10:40   Session # 3    The patient has been notified of the following:   \"We have found that certain health care needs can be provided without the need for a face to face visit.  This service lets us provide the care you need with a phone conversation.  I will have full access to your [facility] medical record during this entire phone call.   I will be taking notes for your medical record. Since this is like an office visit, we will bill your insurance company for this service.  There are potential benefits and risks of telephone visits (e.g. limits to patient confidentiality) that differ from in-person visits.?  Confidentiality still applies for telephone services, and nobody will record the visit.  It is important to be in a quiet, private space that is free of distractions (including cell phone or other devices) during the visit.?? If during the course of the call I believe a telephone visit is not appropriate, you will not be charged for this service\"  Consent has been obtained for this service by care team member: Yes, per verbal agreement     Session Type: Patient is participating in a telephone visit to talk about stress and ways of coping with pandemic.         New symptoms or complaints: having to shelter in place, concerns for some family members who are anxious about events.     Functional Impairment:   Personal: 1  Family: 1  Work: 0  Social:1          ASSESSMENT: Current Emotional / Mental Status (status of significant symptoms):              Risk status (Self / Other harm or suicidal ideation)              Patient reports safe               Patient denies current or recent suicidal ideation or behaviors.              Patient denies current or recent homicidal ideation or behaviors.              Patient denies current or recent self injurious behavior or " ideation.              Patient denies other safety concerns.                    Recommended that patient call 911 or go to the local ED should there be a change in any of these risk factors.                Attitude:                                   Cooperative               Orientation:                             All              Speech                          Rate / Production:       Normal                           Volume:                       Normal               Mood:                                      Normal              Thought Content:                    Clear               Thought Form:                        Coherent  Logical               Insight:                                     Good       Patient's impression of their current status: Pt remains active, is in touch with family members, is worried for  who has many medical concerns and is anxious and needing more assurance. Pt maintaining balance by continuing to get out for walks as able, talking on phone with people, going to her [employer] which remains open to pray. Reports health good, feels safe in residence. Using CBT and prayer and physical activities to maintain balance.     Therapist impression of patients current state: Pt continues to deal well with changes and stressful situations. She uses therapy to process events and get feedback. She reports mood stable and anxiety manageable. She is able to set boundaries and realize limits of control.     Type of psychotherapeutic technique provided: Client centered and CBT    Progress toward short term goals:Progress as expected, Pt discussing concerns and coping strategies during tele-sessions. Pt working on homework assignments and skills learned in therapy between sessions    Review of long term goals: To continue to live a balanced and healthy lifestyle.        Diagnosis:  1. Adjustment disorder with anxious mood    2. BREN (generalized anxiety disorder)          Plan and Follow up: To  continue to work on personal goals. To continue participating in tele-therapy until face-to-face meetings are allowed. To maintain mental health and physical health through this period of heightened health concerns related to corona virus. To remain medication compliant. To call this psychotherapist if additional psychotherapy sessions are needed due to environmental stressors.       Discharge Criteria/Planning: Patient will continue with follow-up until therapy can be discontinued without return of signs and symptoms.              I have reviewed the note as documented above.  This accurately captures the substance of my conversation with the patient.  Wali Carrion, CANDESW

## 2021-06-08 NOTE — PROGRESS NOTES
"Mental Health tele Visit Note    Patient: Ora Og    : 1940 MRN: [id number]    Date: 2020     Start time: 10:00 Stop Time: 10:40   Session # 5    The patient has been notified of the following:   \"We have found that certain health care needs can be provided without the need for a face to face visit.  This service lets us provide the care you need with a phone conversation.  I will have full access to your [facility] medical record during this entire phone call.   I will be taking notes for your medical record. Since this is like an office visit, we will bill your insurance company for this service.  There are potential benefits and risks of telephone visits (e.g. limits to patient confidentiality) that differ from in-person visits.?  Confidentiality still applies for telephone services, and nobody will record the visit.  It is important to be in a quiet, private space that is free of distractions (including cell phone or other devices) during the visit.?? If during the course of the call I believe a telephone visit is not appropriate, you will not be charged for this service\"  Consent has been obtained for this service by care team member: Yes, per verbal agreement     Session Type: Patient is participating in a telephone visit pt lacks technological skills to do video visit.     Chief Complaint   Patient presents with      Follow Up     telephone visit     Anxiety     family stress     Depression     racism in community       New symptoms or complaints: None    Functional Impairment:   Personal: 2  Family: 2  Work: 2  Social:2          ASSESSMENT: Current Emotional / Mental Status (status of significant symptoms):              Risk status (Self / Other harm or suicidal ideation)              Patient reports safe               Patient denies current or recent suicidal ideation or behaviors.              Patient denies current or recent homicidal ideation or behaviors.              Patient denies " "current or recent self injurious behavior or ideation.              Patient denies other safety concerns.                    Recommended that patient call 911 or go to the local ED should there be a change in any of these risk factors.                Attitude:                                   Cooperative               Orientation:                             All              Speech                          Rate / Production:       Normal                           Volume:                       Normal               Mood:                                      Normal              Thought Content:                    Clear               Thought Form:                        Coherent  Logical               Insight:                                     Good       Patient's impression of their current status: Pt reports several stressors including covid outbreak at daughter's group home      things going well enough but misses visiting with her children and grandchildren. Stress from being busy but that provides her with sense of purpose, engagement, relevance. Making decisions for self vs deferring to others. Staying safe. Feels like many \"miracles\" have happened in family as she has let go of trying to control outcomes.  Managing to look on bright side of things, more worried for others than for self. A staff member at daughter's group home tested positive for COVID and some concern about that. engaging in online family activities, going for walks with children as able, feeling pretty content. Still in process of selling house and has found 2nd potential . Daughter in [place] is reaching out to pt. Son 3 yrs sober and getting together with pt for picnics. Finds therapy helpful in discharging some of the stress she feels being engaged in life.       Therapist impression of patients current state: Pt continues processing stressors in therapy and is doing well during pandemic, using spirituality to accept what she can't " change. Going to communion, prayer, online services, famiy zoom, exercise, being loving and communicating with family.  Sees this as opportunity to change in positive ways.     Type of psychotherapeutic technique provided: Client centered and CBT, spirituality    Progress toward short term goals:Progress as expected, Pt discussing concerns and coping strategies during tele-sessions. Pt working on homework assignments and skills learned in therapy between sessions    Review of long term goals: Not done at today's visit       Diagnosis:  1. Adjustment disorder with anxious mood    2. BREN (generalized anxiety disorder)      improved    Plan and Follow up: To continue to work on personal goals. To continue participating in tele-therapy until face-to-face meetings are allowed. To maintain mental health and physical health through this period of heightened health concerns related to corona virus. To remain medication compliant. To call this psychotherapist if additional psychotherapy sessions are needed due to environmental stressors.       Discharge Criteria/Planning: Patient will continue with follow-up until therapy can be discontinued without return of signs and symptoms.              I have reviewed the note as documented above.  This accurately captures the substance of my conversation with the patient.  Wali Carrion, [facility]   6/11/2020

## 2021-06-08 NOTE — PROGRESS NOTES
"Mental Health tele Visit Note    Patient: Ora Og    : 1940 MRN: [id number]    Date: 2020     Start time: 10:00 Stop Time: 10:30   Session # 4    The patient has been notified of the following:   \"We have found that certain health care needs can be provided without the need for a face to face visit.  This service lets us provide the care you need with a phone conversation.  I will have full access to your [facility] medical record during this entire phone call.   I will be taking notes for your medical record. Since this is like an office visit, we will bill your insurance company for this service.  There are potential benefits and risks of telephone visits (e.g. limits to patient confidentiality) that differ from in-person visits.?  Confidentiality still applies for telephone services, and nobody will record the visit.  It is important to be in a quiet, private space that is free of distractions (including cell phone or other devices) during the visit.?? If during the course of the call I believe a telephone visit is not appropriate, you will not be charged for this service\"  Consent has been obtained for this service by care team member: Yes, per verbal agreement     Session Type: Patient is participating in a telephone visit pt lacks technological skills to do video visit.     No chief complaint on file.      New symptoms or complaints: None    Functional Impairment:   Personal: 2  Family: 2  Work: 2  Social:2          ASSESSMENT: Current Emotional / Mental Status (status of significant symptoms):              Risk status (Self / Other harm or suicidal ideation)              Patient reports safe               Patient denies current or recent suicidal ideation or behaviors.              Patient denies current or recent homicidal ideation or behaviors.              Patient denies current or recent self injurious behavior or ideation.              Patient denies other safety concerns.                " "    Recommended that patient call 911 or go to the local ED should there be a change in any of these risk factors.                Attitude:                                   Cooperative               Orientation:                             All              Speech                          Rate / Production:       Normal                           Volume:                       Normal               Mood:                                      Normal              Thought Content:                    Clear               Thought Form:                        Coherent  Logical               Insight:                                     Good       Patient's impression of their current status: Pt reports things going well enough but misses visiting with her children and grandchildren. Stress from being busy but that provides her with sense of purpose, engagement, relevance. Making decisions for self vs deferring to others. Staying safe. Feels like many \"miracles\" have happened in family as she has let go of trying to control outcomes.  Managing to look on bright side of things, more worried for others than for self. A staff member at daughter's group home tested positive for COVID and some concern about that. engaging in online family activities, going for walks with children as able, feeling pretty content. Still in process of selling house and has found 2nd potential . Daughter in [place] is reaching out to pt. Son 3 yrs sober and getting together with pt for picnics. Finds therapy helpful in discharging some of the stress she feels being engaged in life.   Pt sticking to decision to move back to [facility] and will move from senior housing to regular apt and will get additional services as necessary. [employer]. Pt has some concerns about cleanliness of building where she lives and  never around. Daughter JUDIT is sheltering at group home and managing well. Some grief over impact of pandemic on marginalized " people     Therapist impression of patients current state: Pt is doing well, thriving during pandemic, using spirituality to accept what she can't change. Going to communion, prayer, online services, famiy zoom, exercise, being loving and communicating with family.  Sees this as opportunity to change in positive ways.     Type of psychotherapeutic technique provided: Client centered and CBT, spirituality    Progress toward short term goals:Progress as expected, Pt discussing concerns and coping strategies during tele-sessions. Pt working on homework assignments and skills learned in therapy between sessions    Review of long term goals: Not done at today's visit       Diagnosis:  1. Adjustment disorder with anxious mood    2. BREN (generalized anxiety disorder)      improved    Plan and Follow up: To continue to work on personal goals. To continue participating in tele-therapy until face-to-face meetings are allowed. To maintain mental health and physical health through this period of heightened health concerns related to corona virus. To remain medication compliant. To call this psychotherapist if additional psychotherapy sessions are needed due to environmental stressors.       Discharge Criteria/Planning: Patient will continue with follow-up until therapy can be discontinued without return of signs and symptoms.              I have reviewed the note as documented above.  This accurately captures the substance of my conversation with the patient.  CANDE DowdSW

## 2021-06-08 NOTE — PATIENT INSTRUCTIONS - HE
Continue involvement with family via zoom  Continue daily exercise  Meet with daughter from [place] for walk next week

## 2021-06-09 NOTE — PROGRESS NOTES
[facility] Follow Up Note    Assessment/Plan:  1. HTN (hypertension)  Mode control on Norvasc  - Comprehensive Metabolic Panel; Future  - Lipid Cascade; Future    2. 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  Due to previous difficult colonoscopies will do:colo Guard.  There that's normal, given her age we will not need to do any further colon cancer screening unless she is symptomatic    3. Osteoporosis  Obtain bone density test.  She will continue on calcium supplements and will check vitamin D levels  - Vitamin D, Total (25-Hydroxy); Future    4. CRI (chronic renal insufficiency), stage 1  Very mild, she developed it after sepsis due to her cecal volvulus.  Currently has been stable.    5. Anemia  As suspected was secondary to her previous surgery but will repeat hemoglobin iron and B12 levels.  - HM1(CBC and Differential); Future  - Iron and Transferrin Iron Binding Capacity; Future  - Vitamin B12; Future  - Folate, Serum; Future    6. Colon cancer screening  - Cologuard External Result    Jessica Diaz MD    Chief Complaint:  Chief Complaint   Patient presents with     Establish Care     DXA, COLO due       History of Present Illness:  Ora is a 76 y.o. female is history of mild kidney insufficiency, previous cecal volvulus requiring ileostomy, postoperative DVT and PE, cholecystectomy, history of rheumatic fever and seasonal allergies.  She is currently here to establish care.    Patient was told that she has redundant colon.  In the past she had 2 colonoscopies done 5 years apart because it could not see her colon fully.  In 2012 she had another colonoscopy done and shortly after developed cecal volvulus is associated sepsis requiring ileostomy.  Her hospital stay at that time was complicated for E. coli sepsis and subsequent DVT and PE but she has recovered and in 2014 had ileostomy reversal and currently is doing well.   On previous colonoscopies she never had any polyps.  She denies any family history of colon cancer.  She is inquiring about further testing for colon cancer.  Discussed that unless she is having symptoms we can just check: Guard test for now.  She has a slightly frequent bowel movements but no blood in the stools no abdominal pain.    Currently she denied any recurrent DVT or PE.  She does not smoke.  She did so hematologist in 2014.    She has mild renal insufficiency since her septic shock but creatinine has been stable.  She does see renal specialist once a year and creatinine fluctuated between 1.1 and 1.2.  Her blood pressures very well controlled on Norvasc.    She also has history of osteoporosis in the wrist.  It'll health to request her bone density testing.  Unclear if she is osteoporotic in spine or hip.  She is taking calcium supplements but not on any osteoporosis treatment.  We discussed adequate calcium intake and will check vitamin D levels.    She also had a history of rheumatic fever as a child.  Currently denies any shortness of breath cough or chest pains.  I did not appreciate any murmurs on exam.    Patient also wanted to discuss further mammogram recommendations.  Her last was done in September and was normal.  She does have fibrocystic breasts.  She does not have any family history of breast cancer.  The site of surgery and associated complications she is very healthy.  We discussed that we can continue doing mammograms for a few more years.    Review of Systems:  A comprehensive review of systems was performed and was otherwise negative.  No recent falls.  No depression anxiety.  She has mild cataracts and sees ophthalmologist regularly.  No shortness of breath or cough.  No chest pains.    PFSH:  Social History: Reviewed  History   Smoking Status     Never Smoker   Smokeless Tobacco     Not on file     Social History     Social History Narrative    Patient is .  She has 15 children  "who are all grown up.       Past History: Reviewed  Current Outpatient Prescriptions   Medication Sig Dispense Refill     amLODIPine (NORVASC) 5 MG tablet Take 5 mg by mouth daily.       aspirin 81 MG EC tablet Take 81 mg by mouth daily.       calcium carb &cit-vit D3 (CITRACAL + D SLOW RELEASE) 600 mg calcium- 500 unit TbER Take 600 mg by mouth 2 (two) times a day.        PROPYLENE GLYCOL//PF (SYSTANE, PF, OPHT) Apply 2 drops to eye daily. Both eyes       therapeutic multivitamin (THERAGRAN) tablet Take 1 tablet by mouth daily.       zoster vaccine live, PF, (ZOSTAVAX) 19,400 unit/0.65 mL injection Inject 19,400 Units under the skin once for 1 dose. 35098 Units 0     No current facility-administered medications for this visit.        Family History: Reviewed    Physical Exam:    Vitals:    03/02/17 1352   BP: 128/88   Pulse: 86   Resp: 12   SpO2: 97%   Weight: 123 lb (55.8 kg)   Height: 5' 2\" (1.575 m)     Wt Readings from Last 3 Encounters:   03/02/17 123 lb (55.8 kg)   11/07/16 124 lb (56.2 kg)   10/10/16 124 lb (56.2 kg)     Body mass index is 22.5 kg/(m^2).    Constitutional:  Reveals a pleasant female.  Vitals:  Per nursing notes.  HEENT:No cervical LAD, no thyromegaly,  conjunctiva is pink, no scleral icterus, TMs are visualized and normal bl, oropharynx is clear, no exudates,   Cardiac:  Regular rate and rhythm,no murmurs, rubs, or gallops.  Legs without edema. Palpation of the radial pulse regular.  Lungs: Clear to auscultation bl.  Respiratory effort normal.  Abdomen:positive BS, soft, nontender, nondistended.  No hepato-splenomagaly.  Well-healed scar noted  Skin:   Without rash, bruise, or palpable lesions.    Psychiatric: affect appropriate, memory intact.     Data Review:    Analysis and Summary of Old Records (2): Yes     Records Requested (1): Yes bone density      Other History Summarized (from other people in the room) (2): No    Radiology Tests Summarized (XRAY/CT/MRI/DXA) (1): No    Labs " Reviewed (1): Yes    Medicine Tests Reviewed (EKG/ECHO/COLONOSCOPY/EGD) (1): No    Independent Review of EKG or X-RAY (2): No

## 2021-06-10 NOTE — PROGRESS NOTES
"Mental Health tele Visit Note    Patient: Ora Og    : 1940 MRN: [id number]    Date: 2020     Start time: 10:00 Stop Time: 10:40   Session # 7    The patient has been notified of the following:   \"We have found that certain health care needs can be provided without the need for a face to face visit.  This service lets us provide the care you need with a phone conversation.  I will have full access to your [place] medical record during this entire phone call.   I will be taking notes for your medical record. Since this is like an office visit, we will bill your insurance company for this service.  There are potential benefits and risks of telephone visits (e.g. limits to patient confidentiality) that differ from in-person visits.?  Confidentiality still applies for telephone services, and nobody will record the visit.  It is important to be in a quiet, private space that is free of distractions (including cell phone or other devices) during the visit.?? If during the course of the call I believe a telephone visit is not appropriate, you will not be charged for this service\"  Consent has been obtained for this service by care team member: Yes, per verbal agreement     Session Type: Patient is participating in a telephone visit pt lacks technological skills to do video visit.     Chief Complaint   Patient presents with      Follow Up     telephone session     Anxiety     stress from moving, family health problems       New symptoms or complaints: stress over move and 's health    Functional Impairment:   Personal: 2  Family: 2  Work: 2  Social:2          ASSESSMENT: Current Emotional / Mental Status (status of significant symptoms):             Pt denies any SI, intent or plan              Patient reports safe               Patient denies current or recent suicidal ideation or behaviors.              Patient denies current or recent homicidal ideation or behaviors.              Patient " "denies current or recent self injurious behavior or ideation.              Patient denies other safety concerns.                    Recommended that patient call 911 or go to the local ED should there be a change in any of these risk factors.                Attitude:                                   Cooperative               Orientation:                             All              Speech                          Rate / Production:       Normal                           Volume:                       Normal               Mood:                                      Normal              Thought Content:                    Clear               Thought Form:                        Coherent  Logical               Insight:                                     Good       Patient's impression of their current status: Pt reports several stressors that are beyond her control including move to new more independent apartment, 's med and cognitive problems exacerbated by medications and the move. Pt able to handle with patience and \"stubbornness\" and is also hopeful for future because she has support of family, friends, community, able to access additional help as needed. Happy with move and practicing self care by taking moments for self, serenity prayer, walks in park. She continues making decisions for self vs deferring to others. Staying safe.Finally sold house and feels relieved. Finds therapy helpful in discharging some of the stress she feels being engaged in life. Discussed and updated tx plan. Completed screens.      Therapist impression of patients current state: Pt working with this writer to use CBT and mindfulness and logotherapy to deal with stressors. She  reports therapy very helpful and reorienting and letting go of what she can't control and feeling OK with that. She continues processing stressors in therapy and is doing well during pandemic, using spirituality to accept what she can't change. Going to " communion, prayer, online services, famiy zoom, exercise, being loving and communicating with family.  Sees pandemic as opportunity for world to change in positive ways. Updated tx plan. Pt anxiety higher than last screen due to changes including moving to new apt, 's health issues, pandemic.     Type of psychotherapeutic technique provided: Client centered and CBT, spirituality    Progress toward short term goals:Progress as expected, Pt discussing concerns and coping strategies during tele-sessions. Pt working on homework assignments and skills learned in therapy between sessions    Review of long term goals: To care for self during times of stress while also caretaking . 8/21/2020       Diagnosis:  1. BREN (generalized anxiety disorder)    2. Adjustment disorder with anxious mood      improved    Plan and Follow up: To continue to work on personal goals. To continue participating in tele-therapy until face-to-face meetings are allowed. To maintain mental health and physical health through this period of heightened health concerns related to corona virus. To remain medication compliant. To call this psychotherapist if additional psychotherapy sessions are needed due to environmental stressors.       Discharge Criteria/Planning: Patient will continue with follow-up until therapy can be discontinued without return of signs and symptoms.              I have reviewed the note as documented above.  This accurately captures the substance of my conversation with the patient.  Wali Carrion, [facility]   8/21/2020

## 2021-06-10 NOTE — PROGRESS NOTES
"Mental Health tele Visit Note    Patient: Ora Og    : 1940 MRN: [id number]    Date: 2020     Start time: 1:00 Stop Time: 1:40   Session # 6    The patient has been notified of the following:   \"We have found that certain health care needs can be provided without the need for a face to face visit.  This service lets us provide the care you need with a phone conversation.  I will have full access to your [facility] medical record during this entire phone call.   I will be taking notes for your medical record. Since this is like an office visit, we will bill your insurance company for this service.  There are potential benefits and risks of telephone visits (e.g. limits to patient confidentiality) that differ from in-person visits.?  Confidentiality still applies for telephone services, and nobody will record the visit.  It is important to be in a quiet, private space that is free of distractions (including cell phone or other devices) during the visit.?? If during the course of the call I believe a telephone visit is not appropriate, you will not be charged for this service\"  Consent has been obtained for this service by care team member: Yes, per verbal agreement     Session Type: Patient is participating in a telephone visit pt lacks technological skills to do video visit.     Chief Complaint   Patient presents with      Follow Up     telephone session     Anxiety     chronic anxious thoughts       New symptoms or complaints: None    Functional Impairment:   Personal: 2  Family: 2  Work: 2  Social:2          ASSESSMENT: Current Emotional / Mental Status (status of significant symptoms):              Risk status (Self / Other harm or suicidal ideation)              Patient reports safe               Patient denies current or recent suicidal ideation or behaviors.              Patient denies current or recent homicidal ideation or behaviors.              Patient denies current or recent self " "injurious behavior or ideation.              Patient denies other safety concerns.                    Recommended that patient call 911 or go to the local ED should there be a change in any of these risk factors.                Attitude:                                   Cooperative               Orientation:                             All              Speech                          Rate / Production:       Normal                           Volume:                       Normal               Mood:                                      Normal              Thought Content:                    Clear               Thought Form:                        Coherent  Logical               Insight:                                     Good       Patient's impression of their current status: Pt reports several stressors that are beyond her control including 's med and cognitive problems, concerns over COVID, moving to new apt, which she has hard time \"letting go of\" so she experiences a chronic low level of stress. Able to use serenity prayer to let go on daily basis. She reports things going well aside from stress of being busy but that provides her with sense of purpose, engagement, relevance. Making decisions for self vs deferring to others. Staying safe.Finally sold house and feels relieved. Finds therapy helpful in discharging some of the stress she feels being engaged in life.       Therapist impression of patients current state: Pt reports therapy very helpful and reorienting and letting go of what she can't control and feeling OK with that. She continues processing stressors in therapy and is doing well during pandemic, using spirituality to accept what she can't change. Going to communion, prayer, online services, famiy zoom, exercise, being loving and communicating with family.  Sees pandemic as opportunity for world to change in positive ways.     Type of psychotherapeutic technique provided: Client centered and " CBT, spirituality    Progress toward short term goals:Progress as expected, Pt discussing concerns and coping strategies during tele-sessions. Pt working on homework assignments and skills learned in therapy between sessions    Review of long term goals: Not done at today's visit       Diagnosis:  1. BREN (generalized anxiety disorder)      improved    Plan and Follow up: To continue to work on personal goals. To continue participating in tele-therapy until face-to-face meetings are allowed. To maintain mental health and physical health through this period of heightened health concerns related to corona virus. To remain medication compliant. To call this psychotherapist if additional psychotherapy sessions are needed due to environmental stressors.       Discharge Criteria/Planning: Patient will continue with follow-up until therapy can be discontinued without return of signs and symptoms.              I have reviewed the note as documented above.  This accurately captures the substance of my conversation with the patient.  Wali Carrion, [facility]   7/28/2020

## 2021-06-11 NOTE — PROGRESS NOTES
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     [] average     [x] high  Motivation [] low     [x] average     [] high  Willingness [] low     [x] average     [] high        Initial Therapy Plan (ex: develop therapeutic relationship with therapist, Refer to psychiatry/psych testing, etc.):    1. Return to therapy to discuss outcome of diagnostic assessment and create a treatment plan.    2. Continue seeking respite care housing for daughter    3. Discuss referral to psychiatry for medication management consult.    4.  Develop therapeutic relationship with therapist    5.  Reasonable precautions should be taken to assess patient safety in the community.    6. Avoid any enabling behavior toward family members     7. Purchase and read Alanon meditation book    8. Attend 2 Alanon meetings by 7/26/17    Is patient's family involved in the treatment?  [x] No     [] Yes    If yes, How?  Not involved    Therapist s Signature/Supervision/co-signature statement:   Performed and documented by Wali Carrion, MSW, LICSW  LADC

## 2021-06-11 NOTE — PROGRESS NOTES
"Mental Health tele Visit Note    Patient: Ora Og    : 1940 MRN: [id number]    Date: 2020   Start time: 10:00 Stop Time: 10:40   Session # 8    The patient has been notified of the following:   \"We have found that certain health care needs can be provided without the need for a face to face visit.  This service lets us provide the care you need with a phone conversation.  I will have full access to your [facility] medical record during this entire phone call.   I will be taking notes for your medical record. Since this is like an office visit, we will bill your insurance company for this service.  There are potential benefits and risks of telephone visits (e.g. limits to patient confidentiality) that differ from in-person visits.?  Confidentiality still applies for telephone services, and nobody will record the visit.  It is important to be in a quiet, private space that is free of distractions (including cell phone or other devices) during the visit.?? If during the course of the call I believe a telephone visit is not appropriate, you will not be charged for this service\"  Consent has been obtained for this service by care team member: Yes, per verbal agreement     Session Type: Patient is participating in a telephone visit pt lacks technological skills to do video visit.     Chief Complaint   Patient presents with      Follow Up     telephone session     Anxiety     family stress       New symptoms or complaints: stress over changes in lifestyle, housing    Functional Impairment:   Personal: 2  Family: 2  Work: 2  Social:2          ASSESSMENT: Current Emotional / Mental Status (status of significant symptoms):             Pt denies any SI, intent or plan              Patient reports safe               Patient denies current or recent suicidal ideation or behaviors.              Patient denies current or recent homicidal ideation or behaviors.              Patient denies current or recent self " injurious behavior or ideation.              Patient denies other safety concerns.                    Recommended that patient call 911 or go to the local ED should there be a change in any of these risk factors.                Attitude:                                   Cooperative               Orientation:                             All              Speech                          Rate / Production:       Normal                           Volume:                       Normal               Mood:                                      Normal              Thought Content:                    Clear               Thought Form:                        Coherent  Logical               Insight:                                     Good       Patient's impression of their current status: Pt reports dealing with several stressors that are beyond her control including move to new more independent apartment, 's med and cognitive problems exacerbated by medications and the move. Pt using skills learned in therapy to handle anxiety effectively.  Finds therapy helpful in discussing concerns to gain increased insight, support and discharging some of the stress she feels being engaged in life. Discussed and updated tx plan. Completed screens.      Therapist impression of patients current state: Pt continues working with this writer to use CBT and mindfulness and logotherapy to deal with stressors. She  reports therapy very helpful and reorienting and letting go of what she can't control and feeling OK with that. She continues processing stressors in therapy and is doing well during pandemic, using spirituality to accept what she can't change. Going to communion, prayer, online services, famiBCNX zoom, exercise, being loving and communicating with family.      Type of psychotherapeutic technique provided: Client centered and CBT, spirituality    Progress toward short term goals:Progress as expected, Pt discussing concerns and coping  strategies during tele-sessions. Pt working on homework assignments and skills learned in therapy between sessions    Review of long term goals: To care for self during times of stress while also caretaking . 8/21/2020       Diagnosis:  1. BREN (generalized anxiety disorder)    2. Adjustment disorder with anxious mood      improved    Plan and Follow up: To continue to work on personal goals. To continue participating in tele-therapy until face-to-face meetings are allowed. To maintain mental health and physical health through this period of heightened health concerns related to corona virus. To remain medication compliant. To call this psychotherapist if additional psychotherapy sessions are needed due to environmental stressors.       Discharge Criteria/Planning: Patient will continue with follow-up until therapy can be discontinued without return of signs and symptoms.              I have reviewed the note as documented above.  This accurately captures the substance of my conversation with the patient.  Wali Carrion, [facility]  9/11/2020

## 2021-06-11 NOTE — PROGRESS NOTES
Assessment and Plan:       1. 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  3-year follow-up was recommended so she will be due in 2021.  Currently she has been having more acid reflux symptoms after dinner on 10 mg of omeprazole.  Will increase dose to 20 mg and if she still symptomatic she will send me a message through my chart and will increase it to 40 mg a day.  - omeprazole (PRILOSEC) 20 MG capsule; One tab by mouth before dinner  Dispense: 30 capsule; Refill: 11  - HM1(CBC and Differential)  - HM1 (CBC with Diff)    2. Essential hypertension, benign  Well-controlled on losartan and amlodipine  - Lipid Cascade FASTING  - Comprehensive Metabolic Panel  - Thyroid Stimulating Hormone (TSH)    3. Gastroesophageal reflux disease with esophagitis  As above  - omeprazole (PRILOSEC) 20 MG capsule; One tab by mouth before dinner  Dispense: 30 capsule; Refill: 11  - HM1(CBC and Differential)  - HM1 (CBC with Diff)    4. Vitamin D deficiency  - Vitamin D, Total (25-Hydroxy)    5. Encounter for screening mammogram for breast cancer  - Mammo Screening Bilateral; Future    6. Post-menopausal  Will be due in March 2021  - DXA Bone Density Scan; Future    7. Caregiver stress  She will continue seeing psychologist once a month.  Discussed to let me know if mood gets worse so she is having problems with sleeping.    8. Routine general medical examination at a health care facility  As above, preventative care reviewed.  Flu shot was administered today.  She will get shingles vaccine at the pharmacy.    The patient's current medical problems were reviewed.    The following health maintenance schedule was reviewed with the patient and provided in printed form in the after visit summary:   Health Maintenance   Topic Date Due     ZOSTER VACCINES (2 of 3) 05/02/2017     MEDICARE ANNUAL WELLNESS VISIT  03/13/2020     INFLUENZA  VACCINE RULE BASED (1) 08/01/2020     FALL RISK ASSESSMENT  09/17/2021     DXA SCAN  09/27/2021     ADVANCE CARE PLANNING  03/02/2022     LIPID  03/06/2022     TD 18+ HE  09/20/2026     PNEUMOCOCCAL IMMUNIZATION 65+ HIGH/HIGHEST RISK  Completed     HEPATITIS B VACCINES  Aged Out        Subjective:   Chief Complaint: Ora Og is an 80 y.o. female here for an Annual Wellness visit.     HPI:  Ora is a 79 y.o. female with h/o  of mild kidney insufficiency, previous cecal volvulus requiring ileostomy, postoperative DVT and PE, cholecystectomy, history of rheumatic fever and seasonal allergies, post operative recurrent anemia (which resolved spontaneously), Chris's esophagus, .seasonal allergies.  She denies any concerns or complaints today.    Patient's blood pressure is controlled on losartan and Norvasc.  Her weight is stable.  She denies any dizziness or lightheadedness.    She did have history of Smith's esophagus which showed no ITP on endoscopy in 2018.  3-year follow-up was recommended.  Previously we did decrease her PPI from 20 mg to 10 mg and acid reflux is not as well controlled.  Discussed will go back on 20 and if she still experiencing symptoms we could increase dose to 40.  She does have history of hiatal hernia.  She usually experiences acid reflux after dinner which she eats early and denies any nighttime symptoms.    She does take calcium 600 mg a day and eats 2 portions of dairy food a day.    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  She sees our psychologist once a month.  She does have children in the area who help out as well.        Review of Systems: She wears glasses and sees ophthalmologist annually.  She does have hearing deficit and wears hearing aids.  No problems with balance or falling.  She sleeps well.  She exercises by going for walks and denies any chest pain shortness of  breath with exercise tolerance change.  She has chronic loose stools due to history of ileostomy secondary to cecal volvulus in the past.  In the past when she has had colonoscopies they were all incomplete due to redundant colon.  She has had small bowel obstruction once and not since.  She denies any blood in her stool or change in bowel movements.  No urinary problems or blood in the urine.  No vaginal spotting.  Please see above.  The rest of the review of systems are negative for all systems.    Patient Care Team:  Jessica Diaz MD as PCP - General (Internal Medicine)  Vahid Serra MD as Physician (Nephrology)  Joseph Rodriguez MD as Physician (General Surgery)  Alyx Baumann MD as Physician (Hematology and Oncology)  Jessica Diaz MD as Assigned PCP     Patient Active Problem List   Diagnosis     HTN (hypertension)     CKD (chronic kidney disease), stage III (H)     Intestinal adhesions with complete obstruction (H)     Essential hypertension, benign     Anemia     Smith's esophagus without dysplasia     Caregiver stress     Past Medical History:   Diagnosis Date     Allergic rhinitis      Anemia      Aneurysm of aorta (H)     small noted 2012     Cataracts, bilateral      Cecal volvulus (H)      Chronic kidney disease     Chronic stage 3-4     Clotting disorder (H)      DVT (deep venous thrombosis) (H)      Eczema      Gall stones      GERD (gastroesophageal reflux disease)      History of anemia      History of sepsis     postop, & Acute renal failure     Hypertension      Lung nodule     Benign     Osteoporosis      PE (pulmonary embolism) 8/2013    & martha. LE DVT's     Rheumatic fever       Past Surgical History:   Procedure Laterality Date     CHOLECYSTECTOMY  2012     COLON SURGERY  08/2013    for cecal volvulus-hemicolectomy     DILATION AND CURETTAGE OF UTERUS      x2     EXPLORATORY LAPAROTOMY N/A 10/19/2017    Procedure: LAPAROTOMY LYSIS OF ADHESIONS;  Surgeon:  Terry Luna DO;  Location: [facility] OR;  Service:      ILEOSTOMY  9/2013     [place] CLOSE ENTEROSTOMY N/A 9/24/2014    Procedure: TAKEDOWN ILEOSTOMY;  Surgeon: Joseph VILLAGRAN MD;  Location: [facility] OR;  Service: General      Family History   Problem Relation Age of Onset     Emphysema Mother      Heart disease Mother      Peripheral vascular disease Father      Stroke Father      Heart disease Father      Heart disease Sister      Hypertension Sister       Social History     Socioeconomic History     Marital status:      Spouse name: Not on file     Number of children: 15     Years of education: Not on file     Highest education level: Not on file   Occupational History     Occupation: Retired   Social Needs     Financial resource strain: Not on file     Food insecurity     Worry: Not on file     Inability: Not on file     Transportation needs     Medical: Not on file     Non-medical: Not on file   Tobacco Use     Smoking status: Never Smoker     Smokeless tobacco: Never Used   Substance and Sexual Activity     Alcohol use: Yes     Comment: 1/diego.     Drug use: No     Sexual activity: Never   Lifestyle     Physical activity     Days per week: Not on file     Minutes per session: Not on file     Stress: Not on file   Relationships     Social connections     Talks on phone: Not on file     Gets together: Not on file     Attends Jewish service: Not on file     Active member of club or organization: Not on file     Attends meetings of clubs or organizations: Not on file     Relationship status: Not on file     Intimate partner violence     Fear of current or ex partner: Not on file     Emotionally abused: Not on file     Physically abused: Not on file     Forced sexual activity: Not on file   Other Topics Concern     Not on file   Social History Narrative    Patient is .  She has 15 children who are all grown up.    She enjoys walking daily for exercise.       Current Outpatient  "Medications   Medication Sig Dispense Refill     amLODIPine (NORVASC) 2.5 MG tablet Take 1 tablet (2.5 mg total) by mouth 2 (two) times a day. 180 tablet 3     aspirin 81 MG EC tablet Take 81 mg by mouth. Taking every other day.             calcium carb &cit-vit D3 (CITRACAL + D SLOW RELEASE) 600 mg calcium- 500 unit TbER Take 600 mg by mouth daily.              carboxymethylcellulose (REFRESH PLUS) 0.5 % Dpet ophthalmic dropperette        chlorpheniramine/dextromethorp (ROBITUSSIN COUGH & COLD ORAL) Take by mouth.       losartan (COZAAR) 25 MG tablet Take 1 tablet (25 mg total) by mouth daily. 90 tablet 3     therapeutic multivitamin (THERAGRAN) tablet Take 1 tablet by mouth daily.       triamcinolone (KENALOG) 0.1 % cream Apply three times a day prn to body eczema 30 g 5     omeprazole (PRILOSEC) 20 MG capsule One tab by mouth before dinner 30 capsule 11     No current facility-administered medications for this visit.       Objective:   Vital Signs:   Visit Vitals  /80 (Patient Site: Left Arm, Patient Position: Sitting, Cuff Size: Adult Regular)   Pulse 88   Resp 16   Ht 5' 1\" (1.549 m)   Wt 123 lb 4 oz (55.9 kg)   LMP  (LMP Unknown)   SpO2 100%   BMI 23.29 kg/m           VisionScreening:  She wears glasses and sees ophthalmologist annually.    PHYSICAL EXAM  General: well appearing female, alert and oriented x3  EYES: Eyelids, conjunctiva, and sclera were normal. Pupils were normal.   HEAD, EARS, NOSE, MOUTH, AND THROAT: no cervical LAD, no thyromegaly or nodules appreciated. TMs are visualized and normal, oropharynx is clear.  RESPIRATORY: respirations non labored, CTA bl, no wheezes, rales, no forced expiratory wheezing.  CARDIOVASCULAR: Heart rate and rhythm were normal. No murmurs, rubs,gallops. There was no peripheral edema. No carotid bruits.  GASTROINTESTINAL: Positive bowel sounds, abdomen is soft, non tender, non distended.     MUSCULOSKELETAL: Muscle mass was normal for age. No joint synovitis or " deformity.  LYMPHATIC: There were no enlarged nodes palpable.  SKIN/HAIR/NAILS: Skin color was normal.  No rashes.  NEUROLOGIC: The patient was alert and oriented.  Speech was normal.  There is no facial asymmetry.   PSYCHIATRIC:  Mood and affect were normal.  Mild anxiety noted.  Breast exam: No axilla lymphadenopathy, breast masses or skin changes appreciated.      Assessment Results 9/17/2020   Activities of Daily Living No help needed   Instrumental Activities of Daily Living No help needed   Mini Cog Total Score 5   Some recent data might be hidden     A Mini-Cog score of 0-2 suggests the possibility of dementia, score of 3-5 suggests no dementia      Identified Health Risks:     The patient was provided with written information regarding signs of hearing loss.  The patient was provided with suggestions to help her develop a healthy emotional lifestyle.   Information regarding advance directives (living limon), including where she can download the appropriate form, was provided to the patient via the AVS.

## 2021-06-11 NOTE — PROGRESS NOTES
[facility] Follow Up Note    Assessment/Plan:    1. HTN (hypertension)  Blood pressure fluctuates.  I suspect that anxiety and stress have an effect on it.  The office today was completely normal.  I asked her to get blood pressure cuff and get more readings at home.  If it continues to be above 140/90 we might consider putting her on losartan (she had cough with lisinopril).  - amLODIPine (NORVASC) 5 MG tablet; Take 1 tablet (5 mg total) by mouth daily.  Dispense: 90 tablet; Refill: 2    2. Anxiety  Screen is negative for depression based on question year.  Patient has alcoholic son which is causing her a lot of stress and anxiety.  She wanted to defer any antidepressant treatments for anxiety for now but would consider seeing a psychologist to help her cope.  - Ambulatory referral to Psychology    3. PND (post-nasal drip)  - fluticasone (FLONASE) 50 mcg/actuation nasal spray; 2 sprays each nostril daily  Dispense: 16 g; Refill: 12    4. Osteopenia  - DXA Bone Density Scan; Future    5. Colon cancer screening  - Saira Diaz MD    Chief Complaint:  Chief Complaint   Patient presents with     Follow-up     Ear Pain     popping quit a bit affecting hearing       History of Present Illness:  Ora is a 76 y.o. female with history of mild kidney insufficiency, previous cecal volvulus requiring ileostomy, postoperative DVT and PE, cholecystectomy, history of rheumatic fever and seasonal allergies.  She is currently here for follow up.    Patient has history of mild renal insufficiency and high blood pressure.  She is on amlodipine 5 mg a day.  Most recently she has been under a higher stress and her blood pressure was slightly elevated when she saw renal doctor.  Cup foods her blood pressure has been in 140s over 80s.  In the office today her blood pressure is perfect.  She drinks 1 glass of wine a day and does not eat any excessive salt.  I asked him to get blood pressure cuff and  measure her blood pressure at home to get more readings.  I suspect that stress is contributing to her blood pressure fluctuations.  Her blood pressure continues to be suboptimal we can put her on losartan (patient tells me that she had cough on lisinopril.    Patient has a son who is an alcoholic.  Most recently he had a prolonged hospitalization in the hospital and has been in the ICU.  He has been transitioned to a rehab.  This is caused a lot of stress.  Patient filled out questionnaires today and does not have depression but does have moderate anxiety.  Discussed referral to psychologist to help cope with her stress and social situation and patient was interested.  If symptoms get worse we also discussed trying small dose of antidepressant like Lexapro but patient wants to defer it for now.    Patient is also complaining about her ears popping frequently and postnasal drainage giving her hoarse voice.  She has mild seasonal allergies.  No sinus pain or pressure.  In the past antihistamines dried her out too much.  We discussed using Flonase.        Review of Systems:  A comprehensive review of systems was performed and was otherwise negative.  She denies any shortness of breath or chest pain    PFSH:  Social History: Reviewed  History   Smoking Status     Never Smoker   Smokeless Tobacco     Not on file     Social History     Social History Narrative    Patient is .  She has 15 children who are all grown up.    She enjoys walking daily for exercise.        Past History: Reviewed  Current Outpatient Prescriptions   Medication Sig Dispense Refill     amLODIPine (NORVASC) 5 MG tablet Take 1 tablet (5 mg total) by mouth daily. 90 tablet 2     aspirin 81 MG EC tablet Take 81 mg by mouth daily.       calcium carb &cit-vit D3 (CITRACAL + D SLOW RELEASE) 600 mg calcium- 500 unit TbER Take 600 mg by mouth 2 (two) times a day.        fluticasone (FLONASE) 50 mcg/actuation nasal spray 2 sprays each nostril daily 16  "g 12     PROPYLENE GLYCOL//PF (SYSTANE, PF, OPHT) Apply 2 drops to eye daily. Both eyes       therapeutic multivitamin (THERAGRAN) tablet Take 1 tablet by mouth daily.       No current facility-administered medications for this visit.        Family History: Reviewed    Physical Exam:    Vitals:    06/30/17 1504   BP: 128/70   Patient Site: Left Arm   Patient Position: Sitting   Cuff Size: Adult Regular   Pulse: 86   Resp: 16   SpO2: 97%   Weight: 124 lb 9 oz (56.5 kg)   Height: 5' 2\" (1.575 m)     Wt Readings from Last 3 Encounters:   06/30/17 124 lb 9 oz (56.5 kg)   03/02/17 123 lb (55.8 kg)   11/07/16 124 lb (56.2 kg)     Body mass index is 22.78 kg/(m^2).    Constitutional:  Reveals a pleasant female.  Vitals:  Per nursing notes.  HEENT:No cervical LAD, no thyromegaly,  conjunctiva is pink, no scleral icterus, TMs are visualized and normal bl, oropharynx is clear, no exudates,   Cardiac:  Regular rate and rhythm,no murmurs, rubs, or gallops.  Occasional extra beat noted but no arrhythmias.  Legs without edema. Palpation of the radial pulse regular.  Lungs: Clear to auscultation bl.  Respiratory effort normal.  Abdomen:positive BS, soft, nontender, nondistended.  No hepato-splenomagaly  Psychiatric: affect appropriate, memory intact.       Data Review:    Analysis and Summary of Old Records (2): Yes    Records Requested (1): No    Other History Summarized (from other people in the room) (2): No    Radiology Tests Summarized (XRAY/CT/MRI/DXA) (1): No    Labs Reviewed (1): Yes    Medicine Tests Reviewed (EKG/ECHO/COLONOSCOPY/EGD) (1): No    Independent Review of EKG or X-RAY (2): No    "

## 2021-06-12 NOTE — PROGRESS NOTES
Mental Health Visit Note    7/26/2017    Start time: 10:00    Stop Time: 11:00   Session # 2    Ora Og is a 76 y.o. female is being seen today for a follow-up psychotherapy appointment    Chief Complaint   Patient presents with      Follow Up   .     New symptoms or complaints:  continued anxiety over son's alcoholism    Functional Impairment:   The patient identifies the how daily stressors affect daily functioning in today's session as follows (Scale is 1-4, 1=not at all or rarely; 4=extremely so/everyday.)    Personal: 4   Family: 4  Social: 4  Work: 4    Clinical assessment of mental status:   Ora Og presented on time.   She was oriented x3, open and cooperative, and dressed appropriately for this session and weather. Her memory was Normal cognitive functioning .  Her speech was  Within normal.  Language was talkative, spontaneous, normal.  Concentration and focus is Within normal. Psychosis is not noted or reported. She reports her mood is Congruent w/content of speech.  Affect is congruent with speech and is Congruent w/content of speech.  Fund of knowledge is adequate. Insight is adequate for therapy.     Suicidal/Homicidal Ideation present:   No,  Patient denies suicidal and homicidal ideations/means or plans.      Patient's impression of their current status:  Pt is making progress. Attended 3 Alanon meetings past week and found one, a parents meeting Wed nights that she will make homegroup. She feels welcome there and opened up with concerns for son. Worried that family members are enabling son in transition home by trying to take care of his needs. Would like to maintain boundaries and get family on board. Would like to set up family conference to work as team. Family gathers together during crises and one son is gratified by helping others and over does it. Pt worried but working to detach with love and read Alanon literature. Pt provided with Adult Children of Alcoholics book to gain  insight into her own upbringing and impact on her today.    Therapist impression of patient's current state:   Ora Og presents with anxiety and is making progress by engaging in Alanon and following directives for avoiding enabling.    Type of psychotherapeutic technique provided:   Insight oriented, Client centered and Solution-focused    Progress toward short term goals: attended 2nd session, attending nolvia    Review of long term goals: Long-term goals reviewed .td . Date of last review 7/26/2017  .    Diagnosis:   1. Adjustment disorder with anxious mood    Improving     Plan and Follow-up:   Patient will follow safety plan of seeking help from friend/family, calling [facility], calling 911, and/or presenting to the ED if necessary.  Patient will be compliant with medications and attend appointments as scheduled.  Read Adult Children of Alcoholics book  Attend 2-3 Alanon meetings per week  Attend Wed night Nolvia meeting    Discharge Criteria/Planning: Patient will continue with follow-up until therapy can be discontinued without return of signs and symptoms.    Signatures:   Performed and documented by Wali Carrion, TABBY, LICSW, LADC          This note was created with help of Dragon dictation software. Grammatical / typing errors are not intentional and inherent to the software.

## 2021-06-12 NOTE — PROGRESS NOTES
"Mental Health tele Visit Note    Patient: Ora Og    : 1940 MRN: [id number]    Date: 10/13/2020    Start time: 10:00 Stop Time: 10:40   Session # 9    The patient has been notified of the following:   \"We have found that certain health care needs can be provided without the need for a face to face visit.  This service lets us provide the care you need with a phone conversation.  I will have full access to your [place] medical record during this entire phone call.   I will be taking notes for your medical record. Since this is like an office visit, we will bill your insurance company for this service.  There are potential benefits and risks of telephone visits (e.g. limits to patient confidentiality) that differ from in-person visits.?  Confidentiality still applies for telephone services, and nobody will record the visit.  It is important to be in a quiet, private space that is free of distractions (including cell phone or other devices) during the visit.?? If during the course of the call I believe a telephone visit is not appropriate, you will not be charged for this service\"  Consent has been obtained for this service by care team member: Yes, per verbal agreement     Session Type: Patient is participating in a telephone visit pt lacks technological skills to do video visit.     Chief Complaint   Patient presents with      Follow Up     telephone session     Anxiety     family stress       New symptoms or complaints: 's medical challenges    Functional Impairment:   Personal: 2  Family: 2  Work: 2  Social:2          ASSESSMENT: Current Emotional / Mental Status (status of significant symptoms):             Pt denies any SI, intent or plan              Patient reports safe               Patient denies current or recent suicidal ideation or behaviors.              Patient denies current or recent homicidal ideation or behaviors.              Patient denies current or recent self injurious " behavior or ideation.              Patient denies other safety concerns.                    Recommended that patient call 911 or go to the local ED should there be a change in any of these risk factors.                Attitude:                                   Cooperative               Orientation:                             All              Speech                          Rate / Production:       Normal                           Volume:                       Normal               Mood:                                      Normal              Thought Content:                    Clear               Thought Form:                        Coherent  Logical               Insight:                                     Good       Patient's impression of their current status: Pt reports continuing to deal with several stressors that are beyond her control including  's upcoming knee surgery and med and cognitive problems Pt using skills learned in therapy to handle anxiety effectively.  Finds therapy helpful in discussing concerns to gain increased insight, support and discharging some of the stress she feels being engaged in life.       Therapist impression of patients current state: Pt continues working with this writer to use CBT and mindfulness and logotherapy to deal with stressors. She  reports therapy very helpful and reorienting and letting go of what she can't control and feeling OK with that. She continues processing stressors in therapy and is doing well during pandemic, using spirituality to accept what she can't change. Reports therapy helpful in lessening anxiety. Going to communion, prayer, online services, [employer] zoom, exercise, being loving and communicating with family.      Type of psychotherapeutic technique provided: Client centered and CBT, spirituality    Progress toward short term goals:Progress as expected, Pt discussing concerns and coping strategies during tele-sessions. Pt working on homework  assignments and skills learned in therapy between sessions    Review of long term goals: To care for self during times of stress while also caretaking . 8/21/2020       Diagnosis:  1. BREN (generalized anxiety disorder)      improved    Plan and Follow up: To continue to work on personal goals. To continue participating in tele-therapy until face-to-face meetings are allowed. To maintain mental health and physical health through this period of heightened health concerns related to corona virus. To remain medication compliant. To call this psychotherapist if additional psychotherapy sessions are needed due to environmental stressors.       Discharge Criteria/Planning: Patient will continue with follow-up until therapy can be discontinued without return of signs and symptoms.              I have reviewed the note as documented above.  This accurately captures the substance of my conversation with the patient.  Wali Carrion, Southern Maine Health CareSW  10/13/2020

## 2021-06-12 NOTE — PROGRESS NOTES
Mental Health Visit Note    8/25/2017    Start time: 10:00    Stop Time: 11:00   Session # 5    Ora Og is a 76 y.o. female is being seen today for a follow-up psychotherapy appointment    Chief Complaint   Patient presents with     MH Follow Up   .     New symptoms or complaints:  continued anxiety over son's alcoholism    Functional Impairment:   The patient identifies the how daily stressors affect daily functioning in today's session as follows (Scale is 1-4, 1=not at all or rarely; 4=extremely so/everyday.)    Personal: 4   Family: 4  Social: 4  Work: 4    Clinical assessment of mental status:   Ora Og presented on time.   She was oriented x3, open and cooperative, and dressed appropriately for this session and weather. Her memory was Normal cognitive functioning .  Her speech was  Within normal.  Language was talkative, spontaneous, normal.  Concentration and focus is Within normal. Psychosis is not noted or reported. She reports her mood is Congruent w/content of speech.  Affect is congruent with speech and is Congruent w/content of speech.  Fund of knowledge is adequate. Insight is adequate for therapy.     Suicidal/Homicidal Ideation present:   No,  Patient denies suicidal and homicidal ideations/means or plans.      Patient's impression of their current status:  Pt is making progress. She reports working as team with . Reports all children are working on not enabling son. Son returned to hospital past week due to infection, asked pt to pay for cell phone and pt refused to do so as way of avoiding enabling. Pt reports reflecting more and recalling buried memories from past that are painful. Pt oldest child and always responsible one who took care of siblings, peacemaker between alcoholic gentle father and anxious, unhappy drinking mother. She developed helping persona at expense of self. Seeing ways she's neglected own needs at expense of others all her life. Will complete enneagram.    Therapist impression of patient's current state:   Ora Og presents with anxiety and is making progress by engaging in Alanon and following directives for avoiding enabling. She is trying to live her own life and finds gratitude for small things. Concerned and fearful for son but realizing limits of control.  She completed adult children's of alcoholic she completed on serenity prayer assignment this week and will pay attention to her own personal rights.  Continue to attend Al-Anon.  She reports a little bit less confusion and some relief that she does not have to take care of others and can choose to do so if she wants. She reports some satisfaction that other family members are detaching with love from sons alcoholism    Type of psychotherapeutic technique provided:   Insight oriented, Client centered and Solution-focused    Progress toward short term goals: attended 2nd session, attending freedom    Review of long term goals: Long-term goals reviewed .td . Date of last review 8/25/2017  .    Diagnosis:   1. Adjustment disorder with anxious mood    Improving     Plan and Follow-up:   Patient will follow safety plan of seeking help from friend/family, calling [facility] crisis, calling 911, and/or presenting to the ED if necessary.  Patient will be compliant with medications and attend appointments as scheduled.  Read Adult Children of Alcoholics book  Attend 2 Alanon meetings per week  Complete enneagram    Discharge Criteria/Planning: Patient will continue with follow-up until therapy can be discontinued without return of signs and symptoms.    Signatures:   Performed and documented by Wali Carrion, TABBY, LICSW, LADC          This note was created with help of Dragon dictation software. Grammatical / typing errors are not intentional and inherent to the software.

## 2021-06-12 NOTE — PROGRESS NOTES
Mental Health Visit Note    9/1/2017    Start time: 3:00    Stop Time: 4:00   Session # 6    Ora Og is a 76 y.o. female is being seen today for a follow-up psychotherapy appointment    Chief Complaint   Patient presents with      Follow Up   .     New symptoms or complaints:  Pt making progress in detaching and being more assertive.    Functional Impairment:   The patient identifies the how daily stressors affect daily functioning in today's session as follows (Scale is 1-4, 1=not at all or rarely; 4=extremely so/everyday.)    Personal: 2  Family: 2  Social: 2  Work: retired    Clinical assessment of mental status:   Ora Og presented on time.   She was oriented x3, open and cooperative, and dressed appropriately for this session and weather. Her memory was Normal cognitive functioning .  Her speech was  Within normal.  Language was talkative, spontaneous, normal.  Concentration and focus is Within normal. Psychosis is not noted or reported. She reports her mood is Congruent w/content of speech.  Affect is congruent with speech and is Congruent w/content of speech.  Fund of knowledge is adequate. Insight is adequate for therapy.     Suicidal/Homicidal Ideation present:   No,  Patient denies suicidal and homicidal ideations/means or plans.      Patient's impression of their current status:  Pt is making progress.  She is becoming more assertive in expressing her thoughts and feelings with others.  This surprises other people.  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  Patient reports that she has always seen the good in people and has had a hard time being critical of others.  This has sometimes gotten in the way of her setting clear and effective boundaries with others.  She is gaining the ability to see both positive and negative in people place in things  and then to develop protective boundaries to avoid being harmed by negative influences.  She completed the angiogram assignment and scored as a type II to help her and was able to see ways that she has expanded too much energy helping others and has at times enabled them to continue bad behaviors.  This has led her to neglect taking care of herself at times.  We will explore ways that putting other people first and avoiding challenging people honestly can lead to anxiety depression and resentments.  Patient related to the information about her personality type but was unable to relate to any of the unhealthy aspects of that personality type.  She continues to attend 1 Al-Anon meeting in [place] each week and reports that is a good meeting she feels supported and has a lot in common with the other members who also have middle-aged sons struggling with alcoholism.  She is concerned because her son is now at a [facility] but will be transitioning to less intensive care and at that point will be seeking help from family members and will be more prone to self-destructive behaviors.  She is worried that he will relapse and start smoking cigarettes again and she was to make sure that she is able to maintain affective boundaries and detach from his difficulties and not enable him.    Therapist impression of patient's current state:   Ora Og presents with anxiety and is making progress by engaging in Alanon and following directives for avoiding enabling. She is trying to live her own life and finds gratitude for small things. Concerned and fearful for son but realizing limits of control.  She completed adult children's of alcoholic she completed on serenity prayer assignment this week and will pay attention to her own personal rights.  Continue to attend Al-Anon.  She reports a little bit less confusion and some relief that she does not have to take care of others and can choose to do so if she wants.  She reports some satisfaction that other family members are detaching with love from sons alcoholism    Type of psychotherapeutic technique provided:   Insight oriented, Client centered and Solution-focused    Progress toward short term goals: attended 2nd session, attending freedom    Review of long term goals: Long-term goals reviewed .td . Date of last review 9/1/2017  .    Diagnosis:   1. Adjustment disorder with anxious mood    Improving     Plan and Follow-up:   Patient will follow safety plan of seeking help from friend/family, calling [facility], calling 911, and/or presenting to the ED if necessary.  Patient will be compliant with medications and attend appointments as scheduled.  Practice assertive communication  Attend 1 Freedom meetings per week      Discharge Criteria/Planning: Patient will continue with follow-up until therapy can be discontinued without return of signs and symptoms.    Signatures:   Performed and documented by Wali Carrion, TABBY, LICSW, LADC          This note was created with help of Dragon dictation software. Grammatical / typing errors are not intentional and inherent to the software.

## 2021-06-12 NOTE — PROGRESS NOTES
Mental Health Visit Note    9/8/2017    Start time: 10:00    Stop Time: 11:00   Session # 7    Ora Og is a 76 y.o. female is being seen today for a follow-up psychotherapy appointment    Chief Complaint   Patient presents with      Follow Up   .     New symptoms or complaints:  Pt making progress in detaching and being more assertive.    Functional Impairment:   The patient identifies the how daily stressors affect daily functioning in today's session as follows (Scale is 1-4, 1=not at all or rarely; 4=extremely so/everyday.)    Personal: 2  Family: 2  Social: 2  Work: retired    Clinical assessment of mental status:   Ora Og presented on time.   She was oriented x3, open and cooperative, and dressed appropriately for this session and weather. Her memory was Normal cognitive functioning .  Her speech was  Within normal.  Language was talkative, spontaneous, normal.  Concentration and focus is Within normal. Psychosis is not noted or reported. She reports her mood is Congruent w/content of speech.  Affect is congruent with speech and is Congruent w/content of speech.  Fund of knowledge is adequate. Insight is adequate for therapy.     Suicidal/Homicidal Ideation present:   No,  Patient denies suicidal and homicidal ideations/means or plans.      Patient's impression of their current status:  Pt is making progress.  She continues to attend [facility] and is found woman that she will ask to be her sponsor.  She is struggling to find balance in her life.  She is focused on her developmentally disabled daughter, her son who is in transitional housing for alcoholism, and another son who owns a sandwich shop in [place] that is failing.  She feels responsible for all of them and is having a hard time taking time for herself and her .  Even at home sometimes she feels like hostage because her developmentally disabled daughter is living there while trying to place her in group home.  She and her   have a hard time talking about things when her daughter is around her daughter can be very intrusive and impulsive due to her mental health issues.  She was encouraged to focus on things that she would like to add and subtract from her life in order to find more balance.  She reported a desire for more leisure and more time with her  and plans to try to sign up for an art class.  She wants to spend less time helping her son who owns a sandwich shop.  She will also complete a question that I provided her with related to the first step of Al-Anon..    Therapist impression of patient's current state:   Ora Og presents with anxiety and is making progress by engaging in Alanon and following directives for avoiding enabling. She is trying to live her own life and finds gratitude for small things. Concerned and fearful for son but realizing limits of control.  She completed the enneagram information type II and read through information and is working on caring for herself better.  She is also going to secure a sponsor and start working the 12 steps of how long she reports that she does not always want to go to meetings but after she goes to the meeting she feels better.    Type of psychotherapeutic technique provided:   Insight oriented, Client centered and Solution-focused    Progress toward short term goals: attended 2nd session, attending freedom    Review of long term goals: Long-term goals reviewed .td . Date of last review 9/8/2017  .    Diagnosis:   1. Adjustment disorder with anxious mood    Improving     Plan and Follow-up:   Patient will follow safety plan of seeking help from friend/family, calling [facility] crisis, calling 911, and/or presenting to the ED if necessary.  Patient will be compliant with medications and attend appointments as scheduled.  Complete Step 1 questionnaire  Develop balance in life by mapping out time each day for leisure  Attend 1 Freedom meetings per week      Discharge  Criteria/Planning: Patient will continue with follow-up until therapy can be discontinued without return of signs and symptoms.    Signatures:   Performed and documented by Wali Carrion, TABBY, LICSW, LADC          This note was created with help of Dragon dictation software. Grammatical / typing errors are not intentional and inherent to the software.

## 2021-06-12 NOTE — PROGRESS NOTES
Mental Health Visit Note    8/9/2017    Start time: 10:00    Stop Time: 11:00   Session # 4    Ora Og is a 76 y.o. female is being seen today for a follow-up psychotherapy appointment    Chief Complaint   Patient presents with      Follow Up   .     New symptoms or complaints:  continued anxiety over son's alcoholism    Functional Impairment:   The patient identifies the how daily stressors affect daily functioning in today's session as follows (Scale is 1-4, 1=not at all or rarely; 4=extremely so/everyday.)    Personal: 4   Family: 4  Social: 4  Work: 4    Clinical assessment of mental status:   Ora Og presented on time.   She was oriented x3, open and cooperative, and dressed appropriately for this session and weather. Her memory was Normal cognitive functioning .  Her speech was  Within normal.  Language was talkative, spontaneous, normal.  Concentration and focus is Within normal. Psychosis is not noted or reported. She reports her mood is Congruent w/content of speech.  Affect is congruent with speech and is Congruent w/content of speech.  Fund of knowledge is adequate. Insight is adequate for therapy.     Suicidal/Homicidal Ideation present:   No,  Patient denies suicidal and homicidal ideations/means or plans.      Patient's impression of their current status:  Pt is making progress. Attended 2 Alanon meetings past week and hasn't contacted son in past week, allowing him to reach out to her. Has family gathering for grandson who will be teaching 8th grade in [employer], nervous about 2 sons who want to fix exerything and may try to direct other family members to enable son. Pt will make efforts to avoid trying to control that. Completed serentiy prayer assignment and reported feeling broken by son's problems. Moving to [facility] in [place] and found an alanon meeting there. Working to get daughter moved into group home. Provided and reviewed Alanon thinking and detachment material, read meditation  and she picked acceptance card again. Therapist impression of patient's current state:   Ora Og presents with anxiety and is making progress by engaging in Alanon and following directives for avoiding enabling. She is trying to live her own life and finds gratitude for small things. Concerned and fearful for son but realizing limits of control.  She completed adult children's of alcoholic she completed on serenity prayer assignment this week and will pay attention to her own personal rights.  Continue to attend Al-Anon.  She reports a little bit less confusion and some relief that she does not have to take care of others and can choose to do so if she wants.    Type of psychotherapeutic technique provided:   Insight oriented, Client centered and Solution-focused    Progress toward short term goals: attended 2nd session, attending freedom    Review of long term goals: Long-term goals reviewed .td . Date of last review 8/9/2017  .    Diagnosis:   1. Adjustment disorder with anxious mood    Improving     Plan and Follow-up:   Patient will follow safety plan of seeking help from friend/family, calling [facility] crisis, calling 911, and/or presenting to the ED if necessary.  Patient will be compliant with medications and attend appointments as scheduled.  Read Adult Children of Alcoholics book  Attend 2-3 Alanon meetings per week  Complete Serenity prayer assignment  Read Personal Rights material  Practice Detachment with Love    Discharge Criteria/Planning: Patient will continue with follow-up until therapy can be discontinued without return of signs and symptoms.    Signatures:   Performed and documented by Wali Carrion, TABBY, LICSW, LADC          This note was created with help of Dragon dictation software. Grammatical / typing errors are not intentional and inherent to the software.

## 2021-06-12 NOTE — PROGRESS NOTES
Outpatient Mental Health Treatment Plan    Name:  Ora Og  :  1940  MRN:  [id number]    Treatment Plan:  Initial Treatment Plan  Intake/initial treatment plan date:  2017    Benefit and risks and alternatives have been discussed: Yes  Is this treatment appropriate with minimal intrusion/restrictions: Yes  Estimated duration of treatment:  Initial trial of 3-4 sessions, to be reviewed.  Anticipated frequency of services:  Every 1 weeks  Necessity for frequency: This frequency is needed to establish therapeutic goals and for continuity of care in order to monitor progress.  Necessity for treatment: To address cognitive, behavioral, and/or emotional barriers in order to work toward goals and to improve quality of life.      Plan:           ?   ? Anxiety    Goal:  Decrease average anxiety level from 10 to 6.   Strategies: ? [x]Learn and practice relaxation techniques and other coping        strategies (e.g., thought stopping, reframing, meditation)     ? [] Increase involvement in meaningful activities     ? [x] Discuss sleep hygiene     ? [x] Explore thoughts and expectations about self and others     ? [x] Identify and monitor triggers for panic/anxiety symptoms     ? [] Implement physical activity routine (with physician approval)     ? [x] Read Serenity Prayer and review with therapist     ? [x] Continue compliance with medical treatment plan (or explore          barriers)                                       []          Degree to which this is a problem (1-4): 4   Degree to which goal is met (1-4)0  Date of Review:  Adjustment to alcoholism of son    Goal:  Increase % acceptance from 50 to 75.   Strategies: ?[]  Explore thoughts and expectations about self and others   [x] Explore emotional reactions to alcohol-related problems     ?[x] Learn and practice relaxation techniques and other coping        strategies     [x] Implement physical activity routine (with physician approval)                 [x] Increase involvement in meaningful activities                [x] Engage in values clarification and goal-setting     [x] Engage in Alanon activities and readings to develop insights into locus of control        Date of Review    Functional Impairment: 1=Not at all/Rarely  2=Some days  3=Most Days  4=Every Day Personal: 4  Family: 4  Social: 4  Work: 4    Diagnosis:  (EXAMPLE of DSM V) Major depressive disorder, recurrent, moderate; Generalized Anxiety disorder; borderline personality per patient PHI; fibromyalgia, History of breast cancer in remission; Problem with primary relationship.    WHODAS 2.0 12-item version=   H1=   H2=   H3=   Clinical assessments completed: BREN-7, PHQ-9, Mood Questionnaire current, CAGE-AID, PANSI.      Strengths:  Intelligent, caring,healthy, active, good listener, strong desire to find solutions, loving , involved with social network  Limitations:  Severe anxiety, depression, limited ability to change others  Cultural Considerations:  Ora  is a   female with self determination to feel better. She  uses Western medicine and has a strong [employer]ism  [place] . She  has health insurance is able to navigate the system.        Persons responsible for this plan:  ? [x] Patient ? [] Provider ? [] Other: __________________      Provider:Performed and documented by MARGARITA Whitley; [facility] [place][note 2] 7/30/2017    Date:  7/30/2017  Time:  9:14 PM        Patient Signature:____________________________________ Date: ______________     Guardian Signature: __________________________________ Date: ______________     Therapist Signature: __________________________________ Date: ______________

## 2021-06-12 NOTE — PROGRESS NOTES
(Licensed Social Worker)         Outpatient Mental Health Treatment Plan     Name:  Ora Og  :  1940  MRN:  [id number]     Treatment Plan:  Initial Treatment Plan  Intake/initial treatment plan date:  2017     Benefit and risks and alternatives have been discussed: Yes  Is this treatment appropriate with minimal intrusion/restrictions: Yes  Estimated duration of treatment:  Initial trial of 3-4 sessions, to be reviewed.  Anticipated frequency of services:  Every 1 weeks  Necessity for frequency: This frequency is needed to establish therapeutic goals and for continuity of care in order to monitor progress.  Necessity for treatment: To address cognitive, behavioral, and/or emotional barriers in order to work toward goals and to improve quality of life.        Plan:                                                                                                                                          ?                        ? Anxiety                                            Goal:              Decrease average anxiety level from 10 to 6.                        Strategies:                 ? [x]Learn and practice relaxation techniques and other coping                                                                       strategies (e.g., thought stopping, reframing, meditation)                                                                    ? [] Increase involvement in meaningful activities                                                                    ? [x] Discuss sleep hygiene                                                                    ? [x] Explore thoughts and expectations about self and others                                                                    ? [x] Identify and monitor triggers for panic/anxiety symptoms                                                                    ? [] Implement physical activity routine (with physician approval)                                                                     ? [x] Read Serenity Prayer and review with therapist                                                                    ? [x] Continue compliance with medical treatment plan (or explore                                                                    barriers)                                                     []           Degree to which this is a problem (1-4): 4   Degree to which goal is met (1-4)0  Date of Review:  Adjustment to alcoholism of son                                      Goal:              Increase % acceptance from 50 to 75.                        Strategies:                 ?[]  Explore thoughts and expectations about self and others                                                         [x] Explore emotional reactions to alcohol-related problems                                                                    ?[x] Learn and practice relaxation techniques and other coping                                                                       strategies                                                                    [x] Implement physical activity routine (with physician approval)                                                          [x] Increase involvement in meaningful activities                                                          [x] Engage in values clarification and goal-setting                                                                    [x] Engage in Alanon activities and readings to develop insights into locus of control           Date of Review     Functional Impairment: 1=Not at all/Rarely  2=Some days  3=Most Days  4=Every Day Personal: 4  Family: 4  Social: 4  Work: 4     Diagnosis:  (EXAMPLE of DSM V) Major depressive disorder, recurrent, moderate; Generalized Anxiety disorder;  Problem with family relationship.     WHODAS 2.0 12-item version= 10  H1= 20  H2= 2  H3= 18  Clinical assessments completed: BREN-7, PHQ-9, Mood  Questionnaire current, CAGE-AID, PANSI.        Strengths:  Intelligent, caring,healthy, active, good listener, strong desire to find solutions, loving , involved with social network  Limitations:  Severe anxiety, depression, limited ability to change others  Cultural Considerations:  Ora  is a   female with self determination to feel better. She  uses Western medicine and has a strong Lutheran  [place] . She  has health insurance is able to navigate the system.        Persons responsible for this plan:  ? [x] Patient                     ? [] Provider                   ? [] Other: __________________        Provider:Performed and documented by TABBY Whitley- LICSW; [facility] [place][note 3] 7/30/2017     Date:  7/30/2017  Time:  9:14 PM           Patient Signature:____________________________________ Date: ______________      Guardian Signature: __________________________________ Date: ______________      Therapist Signature: __________________________________ Date: ______________

## 2021-06-12 NOTE — PROGRESS NOTES
Mental Health Visit Note    8/3/2017    Start time: 1:00    Stop Time: 2:00   Session # 3    Ora Og is a 76 y.o. female is being seen today for a follow-up psychotherapy appointment    Chief Complaint   Patient presents with      Follow Up   .     New symptoms or complaints:  continued anxiety over son's alcoholism    Functional Impairment:   The patient identifies the how daily stressors affect daily functioning in today's session as follows (Scale is 1-4, 1=not at all or rarely; 4=extremely so/everyday.)    Personal: 4   Family: 4  Social: 4  Work: 4    Clinical assessment of mental status:   Ora Og presented on time.   She was oriented x3, open and cooperative, and dressed appropriately for this session and weather. Her memory was Normal cognitive functioning .  Her speech was  Within normal.  Language was talkative, spontaneous, normal.  Concentration and focus is Within normal. Psychosis is not noted or reported. She reports her mood is Congruent w/content of speech.  Affect is congruent with speech and is Congruent w/content of speech.  Fund of knowledge is adequate. Insight is adequate for therapy.     Suicidal/Homicidal Ideation present:   No,  Patient denies suicidal and homicidal ideations/means or plans.      Patient's impression of their current status:  Pt is making progress. Attended 1 Alanon meetings past week and becoming firmer and setting boundaries with her son.  Avoiding getting together with other family members to talk about alcoholic son in ways that are not helpful.  Reading book provided about children of alcoholics and finding that she really identifies with people in the book.  Patient is hopeful that her daughter will be placed in a group home in the next month or 2.  She was able to identify things that she is responsible for and things others are responsible for.  We discussed the serenity prayer and she identified things that she can change including detaching and not  running around to take care of others setting boundaries with her kids and becoming more assertive and choosing not to dwell on thoughts and choosing not feel guilty.  She realizes she cannot change other people she was provided with serenity prayer assignment to complete by next week.  She was also given a handout about detachment with love.  She is also given a list of her own personal rights.  She will continue to read the adult children of alcoholics book.  She reports therapy is very helpful for helping her to see that she is not at fault or going crazy or the cause of her children's problems.  Therapist impression of patient's current state:   Ora Og presents with anxiety and is making progress by engaging in Alanon and following directives for avoiding enabling.  She is reading adult children's of alcoholic she will work on serenity prayer assignment this week and will pay attention to her own personal rights.  Continue to attend Al-Anon.  She reports a little bit less confusion and some relief that she does not have to take care of others and can choose to do so if she wants.    Type of psychotherapeutic technique provided:   Insight oriented, Client centered and Solution-focused    Progress toward short term goals: attended 2nd session, attending freedom    Review of long term goals: Long-term goals reviewed .td . Date of last review 8/3/2017  .    Diagnosis:   1. Adjustment disorder with anxious mood    Improving     Plan and Follow-up:   Patient will follow safety plan of seeking help from friend/family, calling [facility] crisis, calling 911, and/or presenting to the ED if necessary.  Patient will be compliant with medications and attend appointments as scheduled.  Read Adult Children of Alcoholics book  Attend 2-3 Alanon meetings per week  Complete Serenity prayer assignment  Read Personal Rights material  Practice Detachment with Love    Discharge Criteria/Planning: Patient will continue with  follow-up until therapy can be discontinued without return of signs and symptoms.    Signatures:   Performed and documented by Wali Carrion, TABBY, LICSW, LADC          This note was created with help of Dragon dictation software. Grammatical / typing errors are not intentional and inherent to the software.

## 2021-06-12 NOTE — PROGRESS NOTES
Mental Health Visit Note    8/18/2017    Start time: 10:00    Stop Time: 11:00   Session # 4    Ora Og is a 76 y.o. female is being seen today for a follow-up psychotherapy appointment    Chief Complaint   Patient presents with      Follow Up   .     New symptoms or complaints:  continued anxiety over son's alcoholism    Functional Impairment:   The patient identifies the how daily stressors affect daily functioning in today's session as follows (Scale is 1-4, 1=not at all or rarely; 4=extremely so/everyday.)    Personal: 4   Family: 4  Social: 4  Work: 4    Clinical assessment of mental status:   Ora Og presented on time.   She was oriented x3, open and cooperative, and dressed appropriately for this session and weather. Her memory was Normal cognitive functioning .  Her speech was  Within normal.  Language was talkative, spontaneous, normal.  Concentration and focus is Within normal. Psychosis is not noted or reported. She reports her mood is Congruent w/content of speech.  Affect is congruent with speech and is Congruent w/content of speech.  Fund of knowledge is adequate. Insight is adequate for therapy.     Suicidal/Homicidal Ideation present:   No,  Patient denies suicidal and homicidal ideations/means or plans.      Patient's impression of their current status:  Pt is making progress. She reports working as team with . Reports all children are working on not enabling son. Dealing with moments of acceptance. Attending 1 [place] meeting per week. Completed assignment and practiced thought defusion meditation in session. She chose to watch thoughts fall and wash away like rain. She is cont to care for daughter while looking for appropriate group home placement. Daughter's behavior improved as result of living with parents. Pt finding time for self to ground and get centered while walking. No longer successful using centering prayer but finding walking helps her collect thoughts. [facility] while waiting for longer placement. Had transcendent moment at Alanon meeting where felt acceptance while talking about acceptance. Given thinking traps info to read through as way of detaching from thoughts    Therapist impression of patient's current state:   Ora Og presents with anxiety and is making progress by engaging in Alanon and following directives for avoiding enabling. She is trying to live her own life and finds gratitude for small things. Concerned and fearful for son but realizing limits of control.  She completed adult children's of alcoholic she completed on serenity prayer assignment this week and will pay attention to her own personal rights.  Continue to attend Al-Anon.  She reports a little bit less confusion and some relief that she does not have to take care of others and can choose to do so if she wants. She reports some satisfaction that other family members are detaching with love from sons alcoholism    Type of psychotherapeutic technique provided:   Insight oriented, Client centered and Solution-focused    Progress toward short term goals: attended 2nd session, attending freedom    Review of long term goals: Long-term goals reviewed .td . Date of last review 8/18/2017  .    Diagnosis:   1. Adjustment disorder with anxious mood    Improving     Plan and Follow-up:   Patient will follow safety plan of seeking help from friend/family, calling [facility] crisis, calling 911, and/or presenting to the ED if necessary.  Patient will be compliant with medications and attend appointments as scheduled.  Read Adult Children of Alcoholics book  Attend 2 Alanon meetings per week  Read thinking traps info    Discharge Criteria/Planning: Patient will continue with follow-up until therapy can be discontinued without return of signs and symptoms.    Signatures:   Performed and documented by Wali Carrion, TABBY, LICSW, LADC          This note was created with help of  Dragon dictation software. Grammatical / typing errors are not intentional and inherent to the software.

## 2021-06-13 NOTE — PROGRESS NOTES
"Outpatient Mental Health Treatment Plan      Name:  Ora Og  :  1940  MRN:  [id number]      Treatment Plan:  Updated Treatment Plan  Intake/initial treatment plan date:  2017      Benefit and risks and alternatives have been discussed: Yes  Is this treatment appropriate with minimal intrusion/restrictions: Yes  Estimated duration of treatment:  Trial of 30 sessions, to be reviewed.  Anticipated frequency of services:  Every 4 weeks  Necessity for frequency: This frequency is needed to maintain progress with therapeutic goals and for continuity of care in order to monitor progress.  Necessity for treatment: To address cognitive, behavioral, and/or emotional barriers in order to work toward goals and to improve quality of life.          Plan:                                                                                                                                           ?                        ? Anxiety                                            Goal:              Decrease average anxiety level from 2 to 1.                        Strategies:                 ? [x]????Continue to practice relaxation techniques and other coping                                                                       strategies (e.g., thought stopping, reframing, meditation)                                                                    ? [x]???? Continue involvement in activities that bring you pleasure                                                                    ? [x]???? Practice healthy boundaries by saying \"no\" to requests you don't feel comfortable taking on                                                                    ? [x]???? Continue to explore thoughts and expectations about self and others                                                                    ? [x]???? Identify and monitor triggers for panic/anxiety " symptoms                                                                    ? [x]???? Implement physical activity routine on consistent basis                                                                    ? [x]???? Practice meditation and prayer on daily basis to increase connection with caring higher power                                                                    ? [x]???? Continue compliance with medical treatment plan                              Degree to which this is a problem (1-4): 1   Degree to which goal is met (1-4) 4  Date of Review: 12/9/2020                       Adjustment to change in family roles                                     Goal:              Increase % acceptance from 90 to 95.                        Strategies:                 ?[x]????  Explore thoughts and expectations about self and others                                                         [x]???? Cont to explore emotional reactions to alcohol-related problems                                                                    ?[x]???? Learn and practice relaxation techniques and other coping                                                                       strategies                                                                    [x]???? Implement physical activity routine (with physician approval)                                                          [x]???? Continue involvement in community activities of your choosing                                                          [x]???? Engage in values clarification and goal-setting with self and therapist by completing assignments related to values and goals                                                                    [x]???? Make efforts to feel confident putting your needs first when appropriate          Date of Review:12/9/2020                      Functional Impairment: 1=Not at all/Rarely  2=Some days  3=Most Days  4=Every Day    Personal:2  Family: 2  Social: 1  Work: 0      Diagnosis:  BREN         Clinical assessments completed: BREN-7, PHQ-9, CGI  CSSRI CGI      BREN-7: 5  PHQ-9: 2      Strengths:  Intelligent, caring,healthy, active, good listener, strong desire to find solutions, loving , involved with social network  Limitations:   anxiety, depression, caregiver stress  Cultural Considerations:  Ora  is a   female with self determination to feel better. She  uses Western medicine and has a strong [employer]  [place] . She  has health insurance is able to navigate the system.          Persons responsible for this plan:  ? [x]???? Patient                     ? [x]???? Provider                   ? []???? Other: __________________          Provider:Performed and documented by MARGARITA Whitley; [facility] [place] 12/9/2020                                Date: 12/9/2020                      Time:  10:00AM              Patient Signature:____________________________________ Date: ______________           Therapist Signature: __________________________________  Date:______________

## 2021-06-13 NOTE — PROGRESS NOTES
"Mental Health tele Visit Note    Patient: Ora Og    : 1940 MRN: [id number]    Date: 2020    Start time: 11:00 Stop Time: 11:40   Session # 10    The patient has been notified of the following:   \"We have found that certain health care needs can be provided without the need for a face to face visit.  This service lets us provide the care you need with a phone conversation.  I will have full access to your [facility] medical record during this entire phone call.   I will be taking notes for your medical record. Since this is like an office visit, we will bill your insurance company for this service.  There are potential benefits and risks of telephone visits (e.g. limits to patient confidentiality) that differ from in-person visits.?  Confidentiality still applies for telephone services, and nobody will record the visit.  It is important to be in a quiet, private space that is free of distractions (including cell phone or other devices) during the visit.?? If during the course of the call I believe a telephone visit is not appropriate, you will not be charged for this service\"  Consent has been obtained for this service by care team member: Yes, per verbal agreement     Session Type: Patient is participating in a telephone visit pt lacks technological skills to do video visit.     Chief Complaint   Patient presents with      Follow Up     telephone session     Anxiety     concerns for , covid       New symptoms or complaints: 's medical challenges    Functional Impairment:   Personal: 2  Family: 2  Work: 2  Social:2          ASSESSMENT: Current Emotional / Mental Status (status of significant symptoms):             Pt denies any SI, intent or plan              Patient reports safe               Patient denies current or recent suicidal ideation or behaviors.              Patient denies current or recent homicidal ideation or behaviors.              Patient denies current or recent " self injurious behavior or ideation.              Patient denies other safety concerns.                    Recommended that patient call 911 or go to the local ED should there be a change in any of these risk factors.                Attitude:                                   Cooperative               Orientation:                             All              Speech                          Rate / Production:       Normal                           Volume:                       Normal               Mood:                                      Normal              Thought Content:                    Clear               Thought Form:                        Coherent  Logical               Insight:                                     Good       Patient's impression of their current status: Pt reports stress after 's surgery but very resilient, gracious and continuing to deal with several stressors that are beyond her control including  's upcoming knee surgery and med and cognitive problems Pt using skills learned in therapy to handle anxiety effectively.  Finds therapy helpful in discussing concerns to gain increased insight, support and discharging some of the stress she feels being engaged in life.       Therapist impression of patients current state: Pt problem solving ways of maintaining social support during pandemic. She continues using prayer, family exercise to maintain health and emotional stability, she cont working with this writer to use CBT and mindfulness and logotherapy to deal with stressors. She  reports therapy very helpful and reorienting and letting go of what she can't control and feeling OK with that. She continues processing stressors in therapy and is doing well during pandemic, using spirituality to accept what she can't change. Reports therapy helpful in lessening anxiety. Going to communion, prayer, online services, famiy zoom, exercise, being loving and communicating with family.       Type of psychotherapeutic technique provided: Client centered and CBT, spirituality    Progress toward short term goals:Progress as expected, Pt discussing concerns and coping strategies during tele-sessions. Pt working on homework assignments and skills learned in therapy between sessions    Review of long term goals: To care for self during times of stress while also caretaking . 8/21/2020       Diagnosis:  1. BREN (generalized anxiety disorder)    2. Adjustment disorder with anxious mood      improved    Plan and Follow up: To continue to work on personal goals. To continue participating in tele-therapy until face-to-face meetings are allowed. To maintain mental health and physical health through this period of heightened health concerns related to corona virus. To remain medication compliant. To call this psychotherapist if additional psychotherapy sessions are needed due to environmental stressors.       Discharge Criteria/Planning: Patient will continue with follow-up until therapy can be discontinued without return of signs and symptoms.              I have reviewed the note as documented above.  This accurately captures the substance of my conversation with the patient.  Wali Carrion, [facility]  11/11/2020

## 2021-06-13 NOTE — PROGRESS NOTES
Subjective:      Patient ID: Ora Og is a 77 y.o. female.    Chief Complaint:    HPI Ora Og is a 77 y.o. female who presents today complaining of vomiting x 3 days. Patient states that she has been unable to keep anything down. She states that she hasn't been able to keep water down. She is still urinating. She has been having abdominal bloating and intermittent lower abdominal pain. She denies diarrhea, constipation, or fever. She has a hx of a volvulous that was surgically. After that she had a bacterial Ecoli infection and an Ostomy. Her LBM was Monday morning. She also has been hiccupping. She hasn't been able to eat at all since Monday, but when she vomits it is still like food.         Past Medical History:   Diagnosis Date     Allergic rhinitis      Anemia      Aneurysm of aorta     small noted 2012     Arthritis      Cataracts, bilateral      Cecal volvulus      Chronic kidney disease     Chronic stage 3-4     DVT (deep venous thrombosis)      Eczema      Gall stones      Hypertension      Lung nodule     Benign     Osteoporosis      PE (pulmonary embolism) 8/2013    & martha. LE DVT's     Rheumatic fever      Sepsis     postop, & Acute renal failure       Past Surgical History:   Procedure Laterality Date     CHOLECYSTECTOMY  2012     COLON SURGERY  08/2013    for cecal volvulus-hemicolectomy     DILATION AND CURETTAGE OF UTERUS      x2     ILEOSTOMY  9/2013     WA CLOSE ENTEROSTOMY N/A 9/24/2014    Procedure: TAKEDOWN ILEOSTOMY;  Surgeon: Joseph VILLAGRAN MD;  Location: [facility];  Service: General       Family History   Problem Relation Age of Onset     Emphysema Mother      Heart disease Mother      Peripheral vascular disease Father      Stroke Father      Heart disease Father      Heart disease Sister      Hypertension Sister        Social History   Substance Use Topics     Smoking status: Never Smoker     Smokeless tobacco: Never Used     Alcohol use Yes      Comment: 1/diego.        Review of Systems   Constitutional: Negative for fever.   Gastrointestinal: Positive for abdominal distention, abdominal pain, nausea and vomiting. Negative for blood in stool.       Objective:     /86 (Patient Site: Right Arm, Patient Position: Sitting, Cuff Size: Adult Regular)  Pulse 100  Temp 98.3  F (36.8  C) (Oral)   Resp 16  Wt 119 lb (54 kg)  LMP  (LMP Unknown)  SpO2 93%  Breastfeeding? No  BMI 21.77 kg/m2    Physical Exam   Constitutional: She appears well-developed and well-nourished. No distress.   HENT:   Head: Normocephalic and atraumatic.   Right Ear: External ear normal.   Left Ear: External ear normal.   Eyes: Conjunctivae are normal.   Pulmonary/Chest: Effort normal. No respiratory distress.   Abdominal: She exhibits distension. Bowel sounds are decreased. There is no tenderness.   Abdominal scarring present from previous abdominal surgeries.    Skin: She is not diaphoretic.   Psychiatric: She has a normal mood and affect. Her behavior is normal. Judgment and thought content normal.   Nursing note and vitals reviewed.      Assessment:     Procedures    1. Vomiting           Patient Instructions   Complete workup at [facility] emergency department.

## 2021-06-13 NOTE — PROGRESS NOTES
"Mental Health tele Visit Note    Patient: Ora Og    : 1940 MRN: [id number]    Date: 2020    Start time: 11:00 Stop Time: 11:45   Session # 10    The patient has been notified of the following:   \"We have found that certain health care needs can be provided without the need for a face to face visit.  This service lets us provide the care you need with a phone conversation.  I will have full access to your [place] medical record during this entire phone call.   I will be taking notes for your medical record. Since this is like an office visit, we will bill your insurance company for this service.  There are potential benefits and risks of telephone visits (e.g. limits to patient confidentiality) that differ from in-person visits.?  Confidentiality still applies for telephone services, and nobody will record the visit.  It is important to be in a quiet, private space that is free of distractions (including cell phone or other devices) during the visit.?? If during the course of the call I believe a telephone visit is not appropriate, you will not be charged for this service\"  Consent has been obtained for this service by care team member: Yes, per verbal agreement     Session Type: Patient is participating in a telephone visit pt lacks technological skills to do video visit.     Chief Complaint   Patient presents with      Follow Up     telephone session     Anxiety     pandemic       New symptoms or complaints: 's medical challenges    Functional Impairment:   Personal: 2  Family: 2  Work: 2  Social:2          ASSESSMENT: Current Emotional / Mental Status (status of significant symptoms):             Pt denies any SI, intent or plan              Patient reports safe               Patient denies current or recent suicidal ideation or behaviors.              Patient denies current or recent homicidal ideation or behaviors.              Patient denies current or recent self injurious " behavior or ideation.              Patient denies other safety concerns.                    Recommended that patient call 911 or go to the local ED should there be a change in any of these risk factors.                Attitude:                                   Cooperative               Orientation:                             All              Speech                          Rate / Production:       Normal                           Volume:                       Normal               Mood:                                      Normal              Thought Content:                    Clear               Thought Form:                        Coherent  Logical               Insight:                                     Good       Patient's impression of their current status: Pt reports proud of family. Hopeful for children. Glad for help others give her as well as help she gives others. Happy that son is still sober and working on nursing certification. Trying to avoid overextending self while dealing with stress after 's surgery but very resilient, gracious and continuing to deal with several stressors that are beyond her control including 's upcoming knee surgery and med and cognitive problems Pt using skills learned in therapy to handle anxiety effectively.  Finds therapy helpful in discussing concerns to gain increased insight, support and discharging some of the stress she feels being engaged in life. Reviewed screens and updated tx plan      Therapist impression of patients current state: Pt discussed gratitude, acceptance, balancing life to pace self through stressful times. She continues using prayer, family exercise to maintain health and emotional stability, she cont working with this writer to use CBT and mindfulness and logotherapy to deal with stressors. She  reports therapy very helpful and reorienting and letting go of what she can't control and feeling OK with that. She continues processing  stressors in therapy and is doing well during pandemic, using spirituality to accept what she can't change. Reports therapy helpful in lessening anxiety. Going to communion, prayer, online services, famiy zoom, exercise, being loving and communicating with family.      Type of psychotherapeutic technique provided: Client centered and CBT, spirituality    Progress toward short term goals:Progress as expected, Pt discussing concerns and coping strategies during tele-sessions. Pt working on homework assignments and skills learned in therapy between sessions    Review of long term goals: To care for self during times of stress while also caretaking . 12/9/2020         Diagnosis:  1. BREN (generalized anxiety disorder)    2. Caregiver stress      improved    Plan and Follow up: To continue to work on personal goals. To continue participating in tele-therapy until face-to-face meetings are allowed. To maintain mental health and physical health through this period of heightened health concerns related to corona virus. To remain medication compliant. To call this psychotherapist if additional psychotherapy sessions are needed due to environmental stressors.       Discharge Criteria/Planning: Patient will continue with follow-up until therapy can be discontinued without return of signs and symptoms.              I have reviewed the note as documented above.  This accurately captures the substance of my conversation with the patient.  Wali Carrion, [facility]  12/9/2020

## 2021-06-13 NOTE — ANESTHESIA POSTPROCEDURE EVALUATION
Patient: Ora Og  LAPAROTOMY LYSIS OF ADHESIONS  Anesthesia type: general    Patient location: PACU  Last vitals:   Vitals:    10/19/17 1340   BP: 120/67   Pulse: 97   Resp: 24   Temp:    SpO2: 94%     Post vital signs: stable  Level of consciousness: awake and responds to simple questions  Post-anesthesia pain: pain controlled  Post-anesthesia nausea and vomiting: no  Pulmonary: unassisted, return to baseline  Cardiovascular: stable and blood pressure at baseline  Hydration: adequate  Anesthetic events: no    QCDR Measures:  ASA# 11 - Natacha-op Cardiac Arrest: ASA11B - Patient did NOT experience unanticipated cardiac arrest  ASA# 12 - Natacha-op Mortality Rate: ASA12B - Patient did NOT die  ASA# 13 - PACU Re-Intubation Rate: ASA13B - Patient did NOT require a new airway mgmt  ASA# 10 - Composite Anes Safety: ASA10A - No serious adverse event    Additional Notes:

## 2021-06-13 NOTE — PROGRESS NOTES
"Mental Health Visit Note    11/6/2017    Start time: 10:00    Stop Time: 10:50   Session # 13    Ora Og is a 77 y.o. female is being seen today for a follow-up psychotherapy appointment    Chief Complaint   Patient presents with      Follow Up     Depression     Anxiety   .     New symptoms or complaints:  Pt dealing with aftereffects of recent surgery causing fatigue and some depression.     Functional Impairment:   The patient identifies the how daily stressors affect daily functioning in today's session as follows (Scale is 1-4, 1=not at all or rarely; 4=extremely so/everyday.)    Personal: 2  Family: 2  Social: 2  Work: retired    Clinical assessment of mental status:   Ora Og presented on time.   She was oriented x3, open and cooperative, and dressed appropriately for this session and weather. Her memory was Normal cognitive functioning .  Her speech was  Within normal.  Language was talkative, spontaneous, normal.  Concentration and focus is Within normal. Psychosis is not noted or reported. She reports her mood is Congruent w/content of speech.  Affect is congruent with speech and is Congruent w/content of speech.  Fund of knowledge is adequate. Insight is adequate for therapy.      Suicidal/Homicidal Ideation present:   No,  Patient denies suicidal and homicidal ideations/means or plans.      Patient's impression of their current status:  Pt is making progress. She has been forced by bowel obstruction and surgery to \"let go and let God\" and let children and  take care of themselves. Her DD daughter has become more responsible, restrained and better able to be appropriate socially. She has been more helpful to pt and more functional at home. Her son Nakul is still sober and planning to move to sober housing. She has made a decision to celebrate Tgiving with a daughter regardless of whether other family members feel hurt that she is not spending holiday with them. She feels good " about pos impact letting go has but also feels lack of purpose and meaning when not helping others. Explored differences between helping and enabling.   Still worried about  who has trouble communicating feelings.     Therapist impression of patient's current state:   Ora Og presents with less anxiety and turning over caretaking duties toward family to others as result of surgery. Feels tired and depression sx and sometimes feels selfish for taking care of self. Wants  to be more communicative about thoughts and feelings but knows that's difficult for him and feels selfish when she shares difficulties because she doesn't want to burden others with her problems.  She plans to return to [employer] [facility] when stronger.       Type of psychotherapeutic technique provided:   Insight oriented, Client centered and Solution-focused    Progress toward short term goals: attended  session, attending freedom    Review of long term goals: to improve health and avoid enabling. Date of last review 11/6/2017  .    Diagnosis:   1. Adjustment disorder with anxious mood    Improving     Plan and Follow-up:   Patient will follow safety plan of seeking help from friend/family, [facility], calling 911, and/or presenting to the ED if necessary.  Patient will be compliant with medications and attend appointments as scheduled.  Follow recommendations of medical providers  Take time each day to care for self during surgery recovery    Discharge Criteria/Planning: Patient will continue with follow-up until therapy can be discontinued without return of signs and symptoms.    Signatures:   Performed and documented by Wali Carrion, TABBY, LICSW, LADC          This note was created with help of Dragon dictation software. Grammatical / typing errors are not intentional and inherent to the software.

## 2021-06-13 NOTE — PROGRESS NOTES
[facility] Follow Up Note    Assessment/Plan:    1. Hospital discharge follow-up, SBO (small bowel obstruction)  Patient's appetite is not back yet but she denies any fevers and moving her bowels.  Her blood pressure is on the lower side and will decrease dose of amlodipine.  Will follow up on her electrolytes.  - HM1(CBC and Differential)  - Basic Metabolic Panel  - HM1 (CBC with Diff)    2. HTN (hypertension)  Patient has been on amlodipine 5 mg a day.  Blood pressure is low today.  We discussed that she can stop amlodipine and if blood pressure is 140 or above she will start on 2.5 mg a day.  In the future when she is eating normally she may need to go back on 5 mg dose but will monitor for now  - Basic Metabolic Panel  - amLODIPine (NORVASC) 2.5 MG tablet; Take 1 tablet (2.5 mg total) by mouth daily.  Dispense: 90 tablet; Refill: 3    3. Low magnesium level  We will monitor electrolytes  - Magnesium  - Phosphorus    Jessica Diaz MD    Chief Complaint:  Chief Complaint   Patient presents with     Follow-up     ST ROMO'S; BOWEL OBSTRUCTION; 10/18/17-10/29/17       History of Present Illness:  Ora is a 77 y.o. female with history of mild kidney insufficiency, previous cecal volvulus requiring ileostomy, postoperative DVT and PE, cholecystectomy, history of rheumatic fever and seasonal allergies.  She is currently here for follow up from recent hospitalization.    Patient was hospitalized for acute small bowel obstruction and underwent lysis of adhesions on October 19.  Postoperative course was complicated by ileus, acute renal failure and thrombocytopenia.  Currently patient is for follow-up.    Patient has been at home for several days and overall doing well.  She has high blood pressure and his amlodipine 5 mg a day.  She complains about orthostasis.  Blood pressure today systolically in the 80s and 90s.  Her appetite is not back and she still gets bouts of nausea.  She takes Zofran on  average once a day.  She does eat 3 meals a day and drinks a lot of water and tries to eat salty foods but not very much.  She feels that she continues to lose weight.  She denies any abdominal pain and has been moving bowels several times a day which is normal for her due to her ileostomy.  She denies any fevers or chills.  Today we discussed decreasing her amlodipine dose.  Initially in the hospital her amlodipine was stopped and blood pressure went up so it was restarted at the discharge.    Review of Systems:  A comprehensive review of systems was performed and was otherwise negative.  Patient denies any worsening depression or anxiety, no calf tenderness or shortness of breath or chest pain    PFSH:  Social History: Reviewed  History   Smoking Status     Never Smoker   Smokeless Tobacco     Never Used     Social History     Social History Narrative    Patient is .  She has 15 children who are all grown up.    She enjoys walking daily for exercise.        Past History: Reviewed  Current Outpatient Prescriptions   Medication Sig Dispense Refill     aspirin 81 MG EC tablet Take 81 mg by mouth daily.       calcium carb &cit-vit D3 (CITRACAL + D SLOW RELEASE) 600 mg calcium- 500 unit TbER Take 600 mg by mouth 2 (two) times a day.        fluticasone (FLONASE) 50 mcg/actuation nasal spray 2 sprays each nostril daily 16 g 12     guaiFENesin (ROBITUSSIN) 100 mg/5 mL syrup Take 200 mg by mouth 3 (three) times a day as needed for cough.       ondansetron (ZOFRAN) 4 MG tablet Take 1 tablet (4 mg total) by mouth every 6 (six) hours as needed for nausea. 30 tablet 0     PROPYLENE GLYCOL//PF (SYSTANE, PF, OPHT) Apply 2 drops to eye as needed. Both eyes        therapeutic multivitamin (THERAGRAN) tablet Take 1 tablet by mouth daily.       amLODIPine (NORVASC) 2.5 MG tablet Take 1 tablet (2.5 mg total) by mouth daily. 90 tablet 3     No current facility-administered medications for this visit.        Physical  "Exam:    Vitals:    11/03/17 1142 11/03/17 1213   BP: 98/58 (!) 88/45   Patient Site: Left Arm    Patient Position: Sitting    Cuff Size: Adult Regular    SpO2: 98%    Weight: 112 lb 6.4 oz (51 kg)    Height: 5' 2\" (1.575 m)      Wt Readings from Last 3 Encounters:   11/03/17 112 lb 6.4 oz (51 kg)   10/29/17 107 lb 6.4 oz (48.7 kg)   10/18/17 119 lb (54 kg)     Body mass index is 20.56 kg/(m^2).    Constitutional:  Reveals a pleasant female.  Vitals:  Per nursing notes.  HEENT:No cervical LAD, no thyromegaly,  conjunctiva is pink, no scleral icterus, TMs are visualized and normal bl, oropharynx is clear, no exudates,   Cardiac:  Regular rate and rhythm,no murmurs, rubs, or gallops. . Legs without edema. Palpation of the radial pulse regular.  Lungs: Clear to auscultation bl.  Respiratory effort normal.  Abdomen:positive BS, soft, nontender, nondistended.  No hepato-splenomagaly, she has well-healing incision in the center of her abdomen with Steri-Strips attached, no drainage or cellulitis appreciated.  Skin: As above  Rheumatologic: Normal joints and nails of the hands.  Neurologic:  Cranial nerves II-XII intact.     Psychiatric: affect appropriate, memory intact.     Data Review:    Analysis and Summary of Old Records (2): Yes    Records Requested (1): *No    Other History Summarized (from other people in the room) (2): No    Radiology Tests Summarized (XRAY/CT/MRI/DXA) (1): Yes CT abdomen    Labs Reviewed (1): Yes    Medicine Tests Reviewed (EKG/ECHO/COLONOSCOPY/EGD) (1): No    Independent Review of EKG or X-RAY (2): No    "

## 2021-06-13 NOTE — PROGRESS NOTES
Mental Health Visit Note    9/17/2017    Start time: 10:00    Stop Time: 11:00   Session # 8    Ora Og is a 77 y.o. female is being seen today for a follow-up psychotherapy appointment    Chief Complaint   Patient presents with      Follow Up   .     New symptoms or complaints:  Pt making progress in detaching and being more assertive.    Functional Impairment:   The patient identifies the how daily stressors affect daily functioning in today's session as follows (Scale is 1-4, 1=not at all or rarely; 4=extremely so/everyday.)    Personal: 2  Family: 2  Social: 2  Work: retired    Clinical assessment of mental status:   Ora Og presented on time.   She was oriented x3, open and cooperative, and dressed appropriately for this session and weather. Her memory was Normal cognitive functioning .  Her speech was  Within normal.  Language was talkative, spontaneous, normal.  Concentration and focus is Within normal. Psychosis is not noted or reported. She reports her mood is Congruent w/content of speech.  Affect is congruent with speech and is Congruent w/content of speech.  Fund of knowledge is adequate. Insight is adequate for therapy.     Suicidal/Homicidal Ideation present:   No,  Patient denies suicidal and homicidal ideations/means or plans.      Patient's impression of their current status:  Pt is making progress.  She continues to attend [employer] and is found woman that she will ask to be her sponsor. She is setting better boundaries but feels exhausted. We reviewed her 1st step assignment. Many good insights. Knows she cant control others but tends to worry about them nonetheless. Working on Step 2 this week. Reading daily meditations. Borrowed the freedom 12 step book.  Working to get daugher placed in home and son maintaining abstinence. Will work on meditation techniques next week.     Therapist impression of patient's current state:   Ora Og presents with anxiety and is making progress  by engaging in Alanon and following directives for avoiding enabling. She is trying to live her own life and finds gratitude for small things. Concerned and fearful for son but realizing limits of control.  She completed the Alanon step 1 work and read through information about Step 2. Will complete assignment for next week on Step 2.  and is working on caring for herself better.  She is also going to secure a sponsor and start working the 12 steps of how long she reports that she does not always want to go to meetings but after she goes to the meeting she feels better.    Type of psychotherapeutic technique provided:   Insight oriented, Client centered and Solution-focused    Progress toward short term goals: attended 2nd session, attending freedom    Review of long term goals: Long-term goals reviewed .td . Date of last review 9/17/2017  .    Diagnosis:   1. Adjustment disorder with anxious mood    Improving     Plan and Follow-up:   Patient will follow safety plan of seeking help from friend/family, calling [facility] crisis, calling 911, and/or presenting to the ED if necessary.  Patient will be compliant with medications and attend appointments as scheduled.  Complete Step 2 questionnaire  Develop balance in life by mapping out time each day for leisure  Attend 1 Alanon meetings per week      Discharge Criteria/Planning: Patient will continue with follow-up until therapy can be discontinued without return of signs and symptoms.    Signatures:   Performed and documented by Wali Carrion, TABBY, LICSW, LADC          This note was created with help of Dragon dictation software. Grammatical / typing errors are not intentional and inherent to the software.

## 2021-06-13 NOTE — ANESTHESIA CARE TRANSFER NOTE
Last vitals:   Vitals:    10/19/17 1250   BP: 142/71   Pulse: 96   Resp: 14   Temp: 36.6  C (97.9  F)   SpO2: 97%     Patient spontaneous RR, TV 300s, suctioned, following commands, extubated to facemask 10LPM, O2 sats 100%. VSS. Report to RN.      Patient's level of consciousness is drowsy  Spontaneous respirations: yes  Maintains airway independently: yes  Dentition unchanged: yes  Oropharynx: oropharynx clear of all foreign objects    QCDR Measures:  ASA# 20 - Surgical Safety Checklist: WHO surgical safety checklist completed prior to induction  PQRS# 430 - Adult PONV Prevention: 4558F - Pt received => 2 anti-emetic agents (different classes) preop & intraop  ASA# 8 - Peds PONV Prevention: NA - Not pediatric patient, not GA or 2 or more risk factors NOT present  PQRS# 424 - Natacha-op Temp Management: 4559F - At least one body temp DOCUMENTED => 35.5C or 95.9F within required timeframe  PQRS# 426 - PACU Transfer Protocol: - Transfer of care checklist used  ASA# 14 - Acute Post-op Pain: ASA14B - Patient did NOT experience pain >= 7 out of 10

## 2021-06-13 NOTE — PROGRESS NOTES
HPI: Pt is here for follow up of a Lysis of adhesions 10/19/17 with Dr Luna.   she is doing well.  Pain is well controlled.  No difficulties with the surgical wound/wounds.  she is eating well and denies fever and chills.  Is feeling tired and light headed. Struggling with low bp and seeing her primary for her bp.       /58 (Patient Site: Right Arm, Patient Position: Sitting, Cuff Size: Adult Regular)  Pulse 100  Resp 18  LMP  (LMP Unknown)  Breastfeeding? No    EXAM:  GENERAL:Appears well  ABDOMEN:  Soft, +BS  SURGICAL WOUNDS:  Incisions healing well, no enduration or drainage.        Assessment/Plan: . Doing well after surgery and should follow up as needed.   Becca Shaikh PA-C  [facility] Department of Surgery

## 2021-06-13 NOTE — PROGRESS NOTES
Mental Health Visit Note    10/6/2017    Start time: 10:00    Stop Time: 11:00   Session # 12    Ora Og is a 77 y.o. female is being seen today for a follow-up psychotherapy appointment    Chief Complaint   Patient presents with      Follow Up   .     New symptoms or complaints:  Pt cont making progress in detaching and being more assertive.    Functional Impairment:   The patient identifies the how daily stressors affect daily functioning in today's session as follows (Scale is 1-4, 1=not at all or rarely; 4=extremely so/everyday.)    Personal: 2  Family: 2  Social: 2  Work: retired    Clinical assessment of mental status:   Ora Og presented on time.   She was oriented x3, open and cooperative, and dressed appropriately for this session and weather. Her memory was Normal cognitive functioning .  Her speech was  Within normal.  Language was talkative, spontaneous, normal.  Concentration and focus is Within normal. Psychosis is not noted or reported. She reports her mood is Congruent w/content of speech.  Affect is congruent with speech and is Congruent w/content of speech.  Fund of knowledge is adequate. Insight is adequate for therapy.      Suicidal/Homicidal Ideation present:   No,  Patient denies suicidal and homicidal ideations/means or plans.      Patient's impression of their current status:  Pt is making progress.  She continues to attend [employer], We reviewed family hx and began genogram. Many good insights. Knows she cant control others but tends to worry about them nonetheless. Practiced assertive communication focusing on communication with  who is blaming. Found woman at [facility] who has story similar to hers.  Reading daily meditations. using the [email or web address] 12 step book.  Still working to get daugher placed in home and son maintaining abstinence. Will work on meditation techniques next week. Will review bucket list next week    Therapist impression of patient's current state:   Ora MUSA  Earle presents with anxiety and feeling overwhelmed especially parenting disabled daughter and is making progress by engaging in Alanon and following directives for avoiding enabling.  She completed the Alanon step 3 work and read through information about genogram and assertive communication. She is concerned about  who spends evenings at their old house and holds worries in while working on caring for herself better.  She cont alanon.     Type of psychotherapeutic technique provided:   Insight oriented, Client centered and Solution-focused    Progress toward short term goals: attended 2nd session, attending nolvia    Review of long term goals: Long-term goals reviewed .td . Date of last review 10/6/2017  .    Diagnosis:   1. Adjustment disorder with anxious mood    Improving     Plan and Follow-up:   Patient will follow safety plan of seeking help from friend/family, calling [facility] crisis, calling 911, and/or presenting to the ED if necessary.  Patient will be compliant with medications and attend appointments as scheduled.  Complete Step 3 questionnaire  Develop balance in life by mapping out time each day for leisure  Attend 1 Nolvia meetings per week      Discharge Criteria/Planning: Patient will continue with follow-up until therapy can be discontinued without return of signs and symptoms.    Signatures:   Performed and documented by Wali Carrion, TABBY, LICSW, LADC          This note was created with help of Dragon dictation software. Grammatical / typing errors are not intentional and inherent to the software.

## 2021-06-13 NOTE — ANESTHESIA PREPROCEDURE EVALUATION
Anesthesia Evaluation      Patient summary reviewed     Airway   Mallampati: I  Neck ROM: full   Pulmonary - normal exam    breath sounds clear to auscultation  (-) not a smoker    ROS comment: Hx DVT/PE                         Cardiovascular   (+) hypertension, ,     (-) murmur  ECG reviewed  Rhythm: regular  Rate: normal,    no murmur   ROS comment: Small AAA noted incidentally in 2012     Neuro/Psych - negative ROS     Endo/Other    (+) arthritis,      Comments: Hx rheumatic fever    GI/Hepatic/Renal    (+)   chronic renal disease CRI,     Comments: SBO, nausea & vomiting. G-tube in place, bilious output.          Dental - normal exam                        Anesthesia Plan  Planned anesthetic: general endotracheal  Cricoid pressure with intubation  Glidescope  Consented for postop TAP & subcostal blocks, R/B/A discussed.  ASA 3     Anesthetic plan and risks discussed with: patient  Anesthesia plan special considerations: video-assisted, rapid sequence induction, antiemetics,   Post-op plan: routine recovery

## 2021-06-13 NOTE — PROGRESS NOTES
Mental Health Visit Note    9/22/2017    Start time: 10:00    Stop Time: 11:00   Session # 9    Ora Og is a 77 y.o. female is being seen today for a follow-up psychotherapy appointment    Chief Complaint   Patient presents with      Follow Up   .     New symptoms or complaints:  Pt making progress in detaching and being more assertive.    Functional Impairment:   The patient identifies the how daily stressors affect daily functioning in today's session as follows (Scale is 1-4, 1=not at all or rarely; 4=extremely so/everyday.)    Personal: 2  Family: 2  Social: 2  Work: retired    Clinical assessment of mental status:   Ora Og presented on time.   She was oriented x3, open and cooperative, and dressed appropriately for this session and weather. Her memory was Normal cognitive functioning .  Her speech was  Within normal.  Language was talkative, spontaneous, normal.  Concentration and focus is Within normal. Psychosis is not noted or reported. She reports her mood is Congruent w/content of speech.  Affect is congruent with speech and is Congruent w/content of speech.  Fund of knowledge is adequate. Insight is adequate for therapy.      Suicidal/Homicidal Ideation present:   No,  Patient denies suicidal and homicidal ideations/means or plans.      Patient's impression of their current status:  Pt is making progress.  She continues to attend [employer] but was busy this week and did not attend and also decided that woman who shed planned to choose as sponsor turns out to be too intrusive, offering help and suggestions in an overbearing way. She is setting better boundaries and feels less exhausted. We reviewed her 2nd step assignment. Many good insights. Knows she cant control others but tends to worry about them nonetheless. Working on Step 2 this week. Reading daily meditations. using the alanon 12 step book.  Still working to get daugher placed in home and son maintaining abstinence. Will work on  meditation techniques next week. Moved from home into new apartment on [address] in [place]. Happy with move. Assigned 3rd step and bucket list    Therapist impression of patient's current state:   Ora Og presents with anxiety and is making progress by engaging in Alanon and following directives for avoiding enabling. She is trying to live her own life and finds gratitude for small things. Concerned and fearful for son but realizing limits of control.  She completed the Alanon step 1 work and read through information about Step 2. Will complete assignment for next week on Step 2.  and is working on caring for herself better.  She is also going to secure a sponsor and start working the 12 steps of how long she reports that she does not always want to go to meetings but after she goes to the meeting she feels better.    Type of psychotherapeutic technique provided:   Insight oriented, Client centered and Solution-focused    Progress toward short term goals: attended 2nd session, attending freedom    Review of long term goals: Long-term goals reviewed .td . Date of last review 9/22/2017  .    Diagnosis:   1. Adjustment disorder with anxious mood    Improving     Plan and Follow-up:   Patient will follow safety plan of seeking help from friend/family, calling [facility], calling 911, and/or presenting to the ED if necessary.  Patient will be compliant with medications and attend appointments as scheduled.  Complete Step 3 questionnaire  Develop balance in life by mapping out time each day for leisure  Attend 1 Alanon meetings per week  Write 1,5, 10 yr bucket list plan    Discharge Criteria/Planning: Patient will continue with follow-up until therapy can be discontinued without return of signs and symptoms.    Signatures:   Performed and documented by Wali Carrion, TABBY, LICSW, LADC          This note was created with help of Dragon dictation software. Grammatical / typing errors are not intentional and  inherent to the software.

## 2021-06-13 NOTE — TELEPHONE ENCOUNTER
Refill Approved    Rx renewed per Medication Renewal Policy. Medication was last renewed on 12/5/19.    Tamra Soriano, [facility] Triage/Med Refill 12/1/2020     Requested Prescriptions   Pending Prescriptions Disp Refills     losartan (COZAAR) 25 MG tablet [Pharmacy Med Name: LOSARTAN POTASSIUM 25 MG TA 25 Tablet] 90 tablet 3     Sig: TAKE ONE TABLET BY MOUTH DAILY       Angiotensin Receptor Blocker Protocol Passed - 11/30/2020  1:20 PM        Passed - PCP or prescribing provider visit in past 12 months       Last office visit with prescriber/PCP: 12/5/2019 Jessica iDaz MD OR same dept: Visit date not found OR same specialty: 12/5/2019 Jessica Diaz MD  Last physical: 9/17/2020 Last MTM visit: Visit date not found   Next visit within 3 mo: Visit date not found  Next physical within 3 mo: Visit date not found  Prescriber OR PCP: Jessica Diaz MD  Last diagnosis associated with med order: 1. Essential hypertension  - losartan (COZAAR) 25 MG tablet [Pharmacy Med Name: LOSARTAN POTASSIUM 25 MG TA 25 Tablet]; TAKE ONE TABLET BY MOUTH DAILY  Dispense: 90 tablet; Refill: 3  - amLODIPine (NORVASC) 2.5 MG tablet [Pharmacy Med Name: AMLODIPINE BESYLATE 2.5 MG 2.5 Tablet]; TAKE 1 TABLET BY MOUTH 2 TIMES A DAY  Dispense: 180 tablet; Refill: 3    If protocol passes may refill for 12 months if within 3 months of last provider visit (or a total of 15 months).             Passed - Serum potassium within the past 12 months     Lab Results   Component Value Date    Potassium 4.7 09/17/2020             Passed - Blood pressure filed in past 12 months     BP Readings from Last 1 Encounters:   09/17/20 128/80             Passed - Serum creatinine within the past 12 months     Creatinine   Date Value Ref Range Status   09/17/2020 0.89 0.60 - 1.10 mg/dL Final                amLODIPine (NORVASC) 2.5 MG tablet [Pharmacy Med Name: AMLODIPINE BESYLATE 2.5 MG 2.5 Tablet] 180 tablet 3     Sig: TAKE 1 TABLET BY MOUTH 2  TIMES A DAY       Calcium-Channel Blockers Protocol Passed - 11/30/2020  1:20 PM        Passed - PCP or prescribing provider visit in past 12 months or next 3 months     Last office visit with prescriber/PCP: 12/5/2019 Jessica Diaz MD OR same dept: Visit date not found OR same specialty: 12/5/2019 Jessica Diaz MD  Last physical: 9/17/2020 Last MTM visit: Visit date not found   Next visit within 3 mo: Visit date not found  Next physical within 3 mo: Visit date not found  Prescriber OR PCP: Jessica Diaz MD  Last diagnosis associated with med order: 1. Essential hypertension  - losartan (COZAAR) 25 MG tablet [Pharmacy Med Name: LOSARTAN POTASSIUM 25 MG TA 25 Tablet]; TAKE ONE TABLET BY MOUTH DAILY  Dispense: 90 tablet; Refill: 3  - amLODIPine (NORVASC) 2.5 MG tablet [Pharmacy Med Name: AMLODIPINE BESYLATE 2.5 MG 2.5 Tablet]; TAKE 1 TABLET BY MOUTH 2 TIMES A DAY  Dispense: 180 tablet; Refill: 3    If protocol passes may refill for 12 months if within 3 months of last provider visit (or a total of 15 months).             Passed - Blood pressure filed in past 12 months     BP Readings from Last 1 Encounters:   09/17/20 128/80

## 2021-06-13 NOTE — PROGRESS NOTES
Mental Health Visit Note    9/29/2017    Start time: 10:00    Stop Time: 11:00   Session # 11    Ora Og is a 77 y.o. female is being seen today for a follow-up psychotherapy appointment    Chief Complaint   Patient presents with      Follow Up   .     New symptoms or complaints:  Pt cont making progress in detaching and being more assertive.    Functional Impairment:   The patient identifies the how daily stressors affect daily functioning in today's session as follows (Scale is 1-4, 1=not at all or rarely; 4=extremely so/everyday.)    Personal: 2  Family: 2  Social: 2  Work: retired    Clinical assessment of mental status:   Ora Og presented on time.   She was oriented x3, open and cooperative, and dressed appropriately for this session and weather. Her memory was Normal cognitive functioning .  Her speech was  Within normal.  Language was talkative, spontaneous, normal.  Concentration and focus is Within normal. Psychosis is not noted or reported. She reports her mood is Congruent w/content of speech.  Affect is congruent with speech and is Congruent w/content of speech.  Fund of knowledge is adequate. Insight is adequate for therapy.      Suicidal/Homicidal Ideation present:   No,  Patient denies suicidal and homicidal ideations/means or plans.      Patient's impression of their current status:  Pt is making progress.  She continues to attend Al-Anon, We reviewed her 2nd step assignment. Many good insights. Knows she cant control others but tends to worry about them nonetheless. Working on Step 4 this week. Reading daily meditations. using the alanon 12 step book.  Still working to get daugher placed in home and son maintaining abstinence. Will work on meditation techniques next week. Moved from home into new apartment on [address] in [place]. Happy with move. completed 3rd step and bucket list    Therapist impression of patient's current state:   Ora Og presents with anxiety and  feeling overwhelmed especially parenting disabled daughter and is making progress by engaging in Alanon and following directives for avoiding enabling.  She completed the Alanon step 3 work and read through information about Step 3. Will review this assignment  next week along with bucket list. She is concerned about  who spends evenings at their old house and holds worries in while working on caring for herself better.  She cont alanon.     Type of psychotherapeutic technique provided:   Insight oriented, Client centered and Solution-focused    Progress toward short term goals: attended 2nd session, attending freedom    Review of long term goals: Long-term goals reviewed .td . Date of last review 9/29/2017  .    Diagnosis:   1. Adjustment disorder with anxious mood    Improving     Plan and Follow-up:   Patient will follow safety plan of seeking help from friend/family, calling [facility] crisis, calling 911, and/or presenting to the ED if necessary.  Patient will be compliant with medications and attend appointments as scheduled.  Complete Step 3 questionnaire  Develop balance in life by mapping out time each day for leisure  Attend 1 [facility] meetings per week  Write 1,5, 10 yr bucket list plan    Discharge Criteria/Planning: Patient will continue with follow-up until therapy can be discontinued without return of signs and symptoms.    Signatures:   Performed and documented by Wali Carrion, TABBY, LICSW, LADC          This note was created with help of Dragon dictation software. Grammatical / typing errors are not intentional and inherent to the software.

## 2021-06-14 NOTE — PROGRESS NOTES
"Mental Health tele Visit Note    Patient: Ora Og    : 1940 MRN: [id number]    Date: 2021    Start time: 10:00 Stop Time: 10:45   Session # 11    The patient has been notified of the following:   \"We have found that certain health care needs can be provided without the need for a face to face visit.  This service lets us provide the care you need with a phone conversation.  I will have full access to your [facility] medical record during this entire phone call.   I will be taking notes for your medical record. Since this is like an office visit, we will bill your insurance company for this service.  There are potential benefits and risks of telephone visits (e.g. limits to patient confidentiality) that differ from in-person visits.?  Confidentiality still applies for telephone services, and nobody will record the visit.  It is important to be in a quiet, private space that is free of distractions (including cell phone or other devices) during the visit.?? If during the course of the call I believe a telephone visit is not appropriate, you will not be charged for this service\"  Consent has been obtained for this service by care team member: Yes, per verbal agreement     Session Type: Patient is participating in a telephone visit pt lacks technological skills to do video visit.     Chief Complaint   Patient presents with      Follow Up     telephone session     Anxiety     holiday, pandemic     Depression       New symptoms or complaints: 's medical challenges    Functional Impairment:   Personal: 2  Family: 2  Work: 2  Social:2          ASSESSMENT: Current Emotional / Mental Status (status of significant symptoms):             Pt denies any SI, intent or plan              Patient reports safe               Patient denies current or recent suicidal ideation or behaviors.              Patient denies current or recent homicidal ideation or behaviors.              Patient denies current or recent " self injurious behavior or ideation.              Patient denies other safety concerns.                    Recommended that patient call 911 or go to the local ED should there be a change in any of these risk factors.                Attitude:                                   Cooperative               Orientation:                             All              Speech                          Rate / Production:       Normal                           Volume:                       Normal               Mood:                                      Normal              Thought Content:                    Clear               Thought Form:                        Coherent  Logical               Insight:                                     Good       Patient's impression of their current status: Pt reports hopeful for new year. Trying to accept limits of control while also being supportive of family and community. Processed some regrets from past. Has appt to discuss 's meds with pharm at [facility] on Friday. Maintaining balance, assertiveness despite constraints and pressures of pandemic.   Finds therapy helpful in discussing concerns to gain increased insight, support and discharging some of the stress she feels being engaged in life.       Therapist impression of patients current state: Pt discussed mixed feelings about events and family struggles. Pt notes continued appreciation for life and overall gratitude, acceptance, balancing life to pace self through stressful times. She continues using prayer, family exercise to maintain health and emotional stability, she cont working with this writer to use CBT and mindfulness and logotherapy to deal with stressors. She  reports therapy very helpful and reorienting and letting go of what she can't control and feeling OK with that. She continues processing stressors in therapy and is doing well during pandemic, using spirituality to accept what she can't change. Reports therapy  helpful in lessening anxiety. Going to communion, prayer, online services, famiy zoom, exercise, being loving and communicating with family.      Type of psychotherapeutic technique provided: Client centered and CBT, spirituality    Progress toward short term goals:Progress as expected, Pt discussing concerns and coping strategies during tele-sessions. Pt working on homework assignments and skills learned in therapy between sessions    Review of long term goals: To care for self during times of stress while also caretaking . 1/5/2021         Diagnosis:  1. BREN (generalized anxiety disorder)    2. Caregiver stress      improved    Plan and Follow up: To continue to work on personal goals. To continue participating in tele-therapy until face-to-face meetings are allowed. To maintain mental health and physical health through this period of heightened health concerns related to corona virus. To remain medication compliant. To call this psychotherapist if additional psychotherapy sessions are needed due to environmental stressors.       Discharge Criteria/Planning: Patient will continue with follow-up until therapy can be discontinued without return of signs and symptoms.              I have reviewed the note as documented above.  This accurately captures the substance of my conversation with the patient.  Wali Carrion, [facility] HospitalSW  1/5/2021

## 2021-06-14 NOTE — PROGRESS NOTES
HPI: Patient had an exploratory laparotomy with adhesiolysis with Dr. Luna in October 2017 and was sent to be seen in out clinic by her PCP when she noted to very hard lumps under her skin at each end of her incision. She notes that sometimes they stick out and sometimes they don't. She denies pain, redness, or swelling in the areas.        /79 (Patient Site: Right Arm, Patient Position: Sitting, Cuff Size: Adult Regular)  Pulse 90  LMP  (LMP Unknown)  SpO2 100%  Breastfeeding? No    EXAM:  GENERAL:Appears well  ABDOMEN:  Soft, +BS  SURGICAL WOUNDS:  Incisions healing well, no enduration or drainage. Small hard knot under the skin felt but not seen noted in proximal portion of incision, and small hard knot felt and seen tenting skin in distal portion of incision.      Assessment/Plan: PDS suture knots noted under skin. These knots are not protruding from skin, have no stigma of infection and are not painful to the patient. Explained to patient that they should dissolve, but if they protrude from the skin, show signs of infection or become painful she should return to the clinic.    Blaire Munoz , [facility]

## 2021-06-14 NOTE — CONSULTS
[facility] Hematology and Oncology Consult Note    Patient: Ora Og  MRN: [id number]  Date of Service: 11/21/2017        Reason for Visit    I was consulted by Dr. Diaz regarding anemia    Assessment/Plan    Normocytic anemia    No evidence of bleeding.  Could be postoperative and dilutional.  Need to rule out hemolysis.  She has had history of iron deficiency and we need to rule this out as borderline iron stores may have exacerbated the anemia.  Normal WBC count and platelet count and WBC differential makes a bone marrow process less likely.  Patient appears to be improving symptomatically over the last few days and most recent hemoglobin is noted to be better.    Recommend recheck of CBC, reticulocyte count, Nicole test, ferritin and morphology.  Will have her start ferrous sulfate 325 mg daily.  Will contact her with the results of her blood work today.  If these tests are unrevealing and hemoglobin continues to improve would favor continue oral iron and recheck CBC with her primary physician in about a month to ensure counts are returning to her baseline.  If they do not we could see her back to consider additional testing such as bone marrow aspirate and biopsy.  This is not indicated at this time.    Plan: Recheck CBC today  Also will check reticulocyte count, Nicole test, ferritin and morphology  Start ferrous sulfate 324 mg once daily  We will contact patient with lab results  Should have recheck of CBC with her primary in about a month to ensure her counts are returning to baseline  I did not schedule a follow-up with hematology at this time      ECOG Performance   ECOG Performance Status: 1  Distress Assessment  Distress Assessment Score: No distress        Problem List    1. Chronic anemia  HM1(CBC and Differential)    Reticulocytes    Ferritin    Morphology,Smear Review (MORP)    Direct Antiglobulin Test           CC: Jessica Diaz,  MD      ______________________________________________________________________________      Staging History    No matching staging information was found for the patient.    History    Ms. Ora Og is a very pleasant 77-year-old with past history of hypertension, GERD, osteoporosis and recent hospitalization for abdominal surgery with lysis of adhesions who is referred for anemia.  She was hospitalized between October 18 and October 29 with symptoms of small bowel obstruction.  She did have laparotomy and lysis of adhesions.  Post discharge she was found to have rapid drop in her hemoglobin.  She underwent endoscopy which is negative for any bleeding.  She is referred to us for further evaluation.  Most recent CBC shows normalization of the BC count and some improvement in the hemoglobin.    She reports that she has had low blood counts in the past with her multiple surgeries.  She has required transfusion in the past.  She has occasionally been unable to donate blood.  Back in 2014 she was anemic with a hemoglobin of 9 and responded to iron and vitamin C.  She denies any bleeding currently.  No blood in the stool or urine.  No black stools.  She was initially feeling lightheaded and dizzy with low blood pressures but all of these symptoms have now resolved.  She has had colonoscopy in the past which was negative.  She has occasional bleeding from hemorrhoids.  She reports a recent 10 pound weight loss.  She has previous history of volvulus surgery followed by E. coli infection with septic shock requiring ileostomy which was then taken down.  She has history of DVT and pulmonary embolism after surgery in the past as well.  She has had multiple surgeries as detailed above.  She is also had cholecystectomy.    She is  and retired.  She has 15 kids.  She has no personal history of cancer.  Paternal grandfather had oral cancer and a sister had breast cancer the age of 48.  No family history of anemia.   Patient has had mammograms in the past and they have been fine.  She does not smoke.  She drinks occasionally.    Past History  Past Medical History:   Diagnosis Date     Allergic rhinitis      Anemia      Aneurysm of aorta     small noted 2012     Arthritis      Cataracts, bilateral      Cecal volvulus      Chronic kidney disease     Chronic stage 3-4     Clotting disorder      DVT (deep venous thrombosis)      Eczema      Gall stones      GERD (gastroesophageal reflux disease)      Hypertension      Lung nodule     Benign     Osteoporosis      PE (pulmonary embolism) 8/2013    & martha. LE DVT's     Pneumonia      Rheumatic fever      Sepsis     postop, & Acute renal failure     Past Surgical History:   Procedure Laterality Date     CHOLECYSTECTOMY  2012     COLON SURGERY  08/2013    for cecal volvulus-hemicolectomy     DILATION AND CURETTAGE OF UTERUS      x2     EXPLORATORY LAPAROTOMY N/A 10/19/2017    Procedure: LAPAROTOMY LYSIS OF ADHESIONS;  Surgeon: Terry Luna DO;  [place] [facility] [place]:      ILEOSTOMY  9/2013     NE CLOSE ENTEROSTOMY N/A 9/24/2014    Procedure: TAKEDOWN ILEOSTOMY;  Surgeon: Joseph VILLAGRAN MD;  [facility];  Service: General     Family History   Problem Relation Age of Onset     Emphysema Mother      Heart disease Mother      Peripheral vascular disease Father      Stroke Father      Heart disease Father      Heart disease Sister      Hypertension Sister      Social History     Social History     Marital status:      Spouse name: N/A     Number of children: 15     Years of education: N/A     Occupational History     Retired      Social History Main Topics     Smoking status: Never Smoker     Smokeless tobacco: Never Used     Alcohol use Yes      Comment: 1/diego.     Drug use: No     Sexual activity: No     Other Topics Concern     None     Social History Narrative    Patient is .  She has 15 children who are all grown up.    She enjoys  "walking daily for exercise.      Allergies    Allergies   Allergen Reactions     Apple Swelling     Dust [Other Environmental Allergy]      Pollen and dust       Lactose Diarrhea     Stomach cramps     Mold      Nuts - Unspecified      SENSITIVITY-PT REPORTS, peanuts and almonds     Tomato (Solanum Lycopersicum)      Cold sores     Sulfa (Sulfonamide Antibiotics) Rash       Review of Systems    12 point review of systems completed.          Pain  Currently in Pain: No/denies    Physical Exam    Recent Vitals 11/21/2017   Height 5' 2\"   Weight 113 lbs 3 oz   BSA (m2) 1.5 m2   /71   Pulse 90   Temp 98.2   Temp src 1   SpO2 100   Some recent data might be hidden       GENERAL: Alert and oriented. Seated comfortably. In no distress.    HEAD: Atraumatic and normocephalic.  Has a full head of hair.    EYES: LADARIUS, EOMI.  No pallor.  No icterus.    Oral cavity: no mucosal lesion or tonsillar enlargement.    NECK: supple. JVP normal.  No thyroid enlargement.    LYMPH NODES: No palpable, cervical, axillary or inguinal lymphadenopathy.    CHEST: clear to auscultation bilaterally.  Resonant to percussion throughout bilaterally.  Symmetrical breath movements bilaterally.    CVS: S1 and S2 are heard. Regular rate and rhythm.  No murmur or gallop or rub heard.    ABDOMEN: Soft. Not tender. Not distended.  No palpable hepatomegaly or splenomegaly.  No other mass palpable.  Bowel sounds heard.    EXTREMITIES: Warm.  No peripheral edema.    SKIN: no rash, or bruising or purpura.    CNS: Nonfocal.      Lab Results    Recent Results (from the past 168 hour(s))   HM1 (CBC with Diff)   Result Value Ref Range    WBC 4.2 4.0 - 11.0 thou/uL    RBC 2.60 (L) 3.80 - 5.40 mill/uL    Hemoglobin 8.3 (L) 12.0 - 16.0 g/dL    Hematocrit 24.7 (L) 35.0 - 47.0 %    MCV 95 80 - 100 fL    MCH 31.9 27.0 - 34.0 pg    MCHC 33.6 32.0 - 36.0 g/dL    RDW 11.3 11.0 - 14.5 %    Platelets 248 140 - 440 thou/uL    MPV 7.2 7.0 - 10.0 fL    Neutrophils % 66 " 50 - 70 %    Lymphocytes % 23 20 - 40 %    Monocytes % 8 2 - 10 %    Eosinophils % 2 0 - 6 %    Basophils % 0 0 - 2 %    Neutrophils Absolute 2.8 2.0 - 7.7 thou/uL    Lymphocytes Absolute 1.0 0.8 - 4.4 thou/uL    Monocytes Absolute 0.4 0.0 - 0.9 thou/uL    Eosinophils Absolute 0.1 0.0 - 0.4 thou/uL    Basophils Absolute 0.0 0.0 - 0.2 thou/uL     Hemoglobin was 12.7 on October 17.  Folate, iron saturation, B12 are normal.  CMP is normal except for albumin of 2.4.  Imaging Results    Xr Abdomen Flat And Upright    Result Date: 10/27/2017  XR ABDOMEN FLAT AND UPRIGHT 10/27/2017 9:09 AM INDICATION: SBO vs. ileus, ongoing distention COMPARISON: 10/19/2017 radiograph. 10/25/2017 small bowel follow-through. FINDINGS: No NG tube present. A suture line over the left upper quadrant is again seen. No significant distention of the stomach noted on this study. Multiple loops of small bowel are again distended. Air-fluid levels on the upright radiograph are likely  within small bowel loops. No definite gaseous distention of colon. Findings are again concerning for ileus or small bowel obstruction. No free air. Status post cholecystectomy.    Xr Gastrografin Small Bowel Follow Through    Result Date: 10/26/2017  XR GASTROGRAFIN SMALL BOWEL FOLLOW-THROUGH 10/25/2017 3:51 PM INDICATION: Small bowel obstruction recurrent. TECHNIQUE: Routine. COMPARISON: None. FINDINGS: FLUOROSCOPIC TIME: 0 minutes NUMBER OF IMAGES: 5.  radiograph demonstrates multiple loops of dilated air-filled small bowel. There is also air within the transverse colon. 1 bottle of Gastrografin contrast was injected into the stomach via patient's indwelling NG tube. Oral contrast has migrated completely and the small bowel with no residual contrast in the stomach. No definite large bowel contrast is seen at 4 hours 40 minutes postinjection. These findings may reflect a distal small bowel obstruction or generalized ileus.         Signed by: Alyx Corbin  MD I[facility]

## 2021-06-14 NOTE — PROGRESS NOTES
Mental Health Visit Note    11/17/2017    Start time: 10:00    Stop Time: 10:50   Session # 14    Ora Og is a 77 y.o. female is being seen today for a follow-up psychotherapy appointment    Chief Complaint   Patient presents with     MH Follow Up     Anxiety   .     New symptoms or complaints:  Pt dealing with aftereffects of recent surgery causing fatigue and some depression. Anxiety over getting daughter placed in DD home    Functional Impairment:   The patient identifies the how daily stressors affect daily functioning in today's session as follows (Scale is 1-4, 1=not at all or rarely; 4=extremely so/everyday.)    Personal: 2  Family: 2  Social: 2  Work: retired    Clinical assessment of mental status:   Ora Og presented on time.   She was oriented x3, open and cooperative, and dressed appropriately for this session and weather. Her memory was Normal cognitive functioning .  Her speech was  Within normal.  Language was talkative, spontaneous, normal.  Concentration and focus is Within normal. Psychosis is not noted or reported. She reports her mood is Congruent w/content of speech.  Affect is congruent with speech and is Congruent w/content of speech.  Fund of knowledge is adequate. Insight is adequate for therapy.      Suicidal/Homicidal Ideation present:   No,  Patient denies suicidal and homicidal ideations/means or plans.      Patient's impression of their current status:  Pt is making progress. She cont tp deal with med issues and low hemoglobin. Will spend holiday with daughter. Dealing with emotions using coping skills and CBT  Therapist impression of patient's current state:   Ora Og presents with less anxiety and turning over caretaking duties toward family to others as result of surgery. Feels tired and depression sx and sometimes feels selfish for taking care of self.     Type of psychotherapeutic technique provided:   Insight oriented, Client centered and  Solution-focused    Progress toward short term goals: attended  session, attending [facility]    Review of long term goals: to improve health and avoid enabling.  .    Diagnosis:   1. Adjustment disorder with anxious mood    Improving     Plan and Follow-up:   Patient will follow safety plan of seeking help from friend/family, calling [facility] crisis, calling 911, and/or presenting to the ED if necessary.  Patient will be compliant with medications and attend appointments as scheduled.  Follow recommendations of medical providers  Take time each day to care for self during surgery recovery    Discharge Criteria/Planning: Patient will continue with follow-up until therapy can be discontinued without return of signs and symptoms.    Signatures:   Performed and documented by Wali Carrion, TABBY, LICSW, LADC          This note was created with help of Dragon dictation software. Grammatical / typing errors are not intentional and inherent to the software.

## 2021-06-14 NOTE — PROGRESS NOTES
Call placed to [facility] to schedule pt for Endoscopy tomorrow - Pt scheduled for 11/14 @ 2:15pm @ [place] location No Solids after 11:30pm and NPO after 11am tomorrow.  Pt called and informed of appointment and instructions

## 2021-06-14 NOTE — PROGRESS NOTES
[facility] Follow [facility]    Assessment/Plan:    1.  Postop anemia  Anemia happened 3 weeks after surgery.  Unclear what kind of reaction is that.  Patient's a hematologist and repeat blood work including iron levels, calm Nicole test and reticulocyte count were all normal.  Currently hemoglobin is rebounding on its own.  Will repeat hemoglobin again in 1 month.  It is good to have this information for next elective surgery.  Patient will need close follow-up of hemoglobin every week postoperatively in the future.  - HM1(CBC and Differential); Future    2. HTN (hypertension)  Blood pressure is normal off amlodipine due to weight loss associated with recent abdominal surgery.  For now we discussed to monitor blood pressure at home and we may consider restarting amlodipine if it is above 140 systolically    3. Pain of left calf  Patient has had history of postop DVT and PE in the past.  She is currently 1 months postop.  She will restart her aspirin.  Will check ultrasound to evaluate for superficial phlebitis.  If it is positive for phlebitis I would increase her aspirin to 325 mg a day.  - US Venous Insufficency Leg Left; Future    4.  Smith's esophagus.  Recent endoscopy did not show any stomach ulcer but biopsy was positive for Smith's.  There was no dysplasia.  Discussed that she should decrease her consumption of alcohol (patient drinks 1 glass of wine daily).  She has no acid reflux so I am not sure omeprazole is appropriate.  From the literature review it appears that aspirin is not chemoprevention off progression of Smith's esophagus.  I would recommend that she has repeat EGD done in a year and if nothing changes may be have endoscopy done every 3 years.    5.  Incision irregularity.  Some 2 hard spots around her incision.  I do not suspect abscess.  I suspect that may be 1 of the sutures did not dissolve and it got surrounded by connective tissue.  Thus there is some nontender.  I  recommended that if they did not disappear she should follow-up with her surgeon to discuss.    Jessica Diaz MD    Chief Complaint:  Chief Complaint   Patient presents with     Follow-up       History of Present Illness    Ora is a77 y.o femalewith history of mild kidney insufficiency, previous cecal volvulus requiring ileostomy, postoperative DVT and PE, cholecystectomy, history of rheumatic fever and seasonal allergies.  She is currently here for follow up.    Patient had small bowel obstruction requiring lysis of adhesion surgery back in October 19.  3 weeks after her surgery she developed quickly worsening anemia with hemoglobin dropping from 11 to 7.6 and the lowest.  She had endoscopy done at that time which did not show any source of bleeding.  Subsequently hemoglobin levels came up on its own to 8.2.  She did see a hematologist.  They were not sure what caused anemia.  They did not think per patient that it was any reaction to medication.  I doubt that anemia was dilutional because it was 3 weeks after surgery.  She was recommended to take iron supplements although her iron tests have been normal.  Reticulocyte count and Nicole tests were negative so hemolysis is less likely.  I am not sure why this is happening.  Patient reports that last time she needed surgery several weeks after the surgery she needed to have transfusion done because her hemoglobin levels were so low.  Similar to this picture.  Currently we discussed repeating her CBC in a month.  She can restart her baby aspirin which she takes for DVT prophylaxis due to history of postop DVT and PE in the past.    Also since her surgery because patient has not been feeling very well she lost 10 pounds and only gained only to back.  She has had high blood pressure before but because her blood pressure was low and she was not eating back to normal we stopped her amlodipine.  I did give her a small dose of it at 2.5 mg to take if her blood pressure  started to rise up.  Currently at home blood pressure has been in 120s and 130s and is normal in the office visit without any blood pressure medication.  We discussed that she can stay off amlodipine.  She may need to restart it if blood pressure goes up as she gains weight back and blood pressure increases as well.    Also when patient has endoscopy done they did biopsy part of her esophagus which showed Smith's mucosa without dysplasia.  There was no ulcerations in the esophagus or stomach.  From reviewing up to date it appears that aspirin and does help prevent Smith's from progressing.  Patient currently does not have any acid reflux symptoms and I do not think she needs to stay on omeprazole.  We discussed that she should probably have a repeat endoscopy done in a year with biopsy and if there is no change in it to probably would require Smith's surveillance with endoscopy every 3 years.  Discussed that she should not drink alcohol.  Patient does drink 1 or 2 glasses of alcohol and night because it helps relax.  Discussed that that would be not good for Smith's.    Patient is also complaining about mild pain in her left leg on and off.  She does have significant varicose veins and wear compression stockings.  The left lateral side of the legs there is palpable varicose vein.  It might be slightly pinker than the rest.  Discussed that she might be at risk of superficial phlebitis and patient has had it in the past.  I recommended that we will do an ultrasound.  She will also restart aspirin.  For now she can take 81 mg but if she has phlebitis she will need to take 325 mg.      Patient is also questioning mild lumpiness around her incision.  Her incision is healed well.  She does have abdominal hernia from incision.  But there is also to hard to palpation lesions around her incision.  She did not feel like cyst.  I would suspect that it is probably as sutures it did not get dissolved and got surrounded  "by connective tissue.  Patient reports that she has noticed that only recently.  I recommended that she follows up with her surgeon.    Review of Systems:  A comprehensive review of systems was performed and was otherwise negative.  Patient is feeling much better.  She reports that her weakness has resolved, appetite is good.  She denies any nausea, bowels are regular.    PFSH:  Social History: Reviewed  History   Smoking Status     Never Smoker   Smokeless Tobacco     Never Used     Social History     Social History Narrative    Patient is .  She has 15 children who are all grown up.    She enjoys walking daily for exercise.        Past History: Reviewed  Current Outpatient Prescriptions   Medication Sig Dispense Refill     calcium carb &cit-vit D3 (CITRACAL + D SLOW RELEASE) 600 mg calcium- 500 unit TbER Take 600 mg by mouth 2 (two) times a day.        fluticasone (FLONASE) 50 mcg/actuation nasal spray 2 sprays each nostril daily 16 g 12     guaiFENesin (ROBITUSSIN) 100 mg/5 mL syrup Take 200 mg by mouth 3 (three) times a day as needed for cough.       PROPYLENE GLYCOL//PF (SYSTANE, PF, OPHT) Apply 2 drops to eye as needed. Both eyes        therapeutic multivitamin (THERAGRAN) tablet Take 1 tablet by mouth daily.       amLODIPine (NORVASC) 2.5 MG tablet Take 1 tablet (2.5 mg total) by mouth daily. 90 tablet 3     aspirin 81 MG EC tablet Take 81 mg by mouth daily.       No current facility-administered medications for this visit.        Physical Exam:    Vitals:    11/27/17 1259   BP: 126/64   Patient Site: Left Arm   Patient Position: Sitting   Cuff Size: Adult Regular   Pulse: 92   Resp: 16   SpO2: 99%   Weight: 115 lb 2 oz (52.2 kg)   Height: 5' 2\" (1.575 m)     Wt Readings from Last 3 Encounters:   11/27/17 115 lb 2 oz (52.2 kg)   11/21/17 113 lb 3.2 oz (51.3 kg)   11/03/17 112 lb 6.4 oz (51 kg)     Body mass index is 21.06 kg/(m^2).    Constitutional:  Reveals a pleasant female.  Vitals:  Per " nursing notes.  HEENT:No cervical LAD, no thyromegaly,  conjunctiva is pink, no scleral icterus, TMs are visualized and normal bl, oropharynx is clear, no exudates,   Cardiac:  Regular rate and rhythm,no murmurs, rubs, or gallops.  Legs with trace edema, compression stockings on. Palpation of the radial pulse regular.  Lungs: Clear to auscultation bl.  Respiratory effort normal.  Abdomen:positive BS, soft, nontender, nondistended.  No hepato-splenomagaly, incisional abdominal hernia is noted  Skin: Abdominal incision is healed well, no cellulitis or drainage.  Under the skin there are 2 hard areas which do not resemble cysts.  I suspect that that can be undissolved suture.  Rheumatologic: Normal joints and nails of the hands.  Neurologic:  Cranial nerves II-XII intact.     Psychiatric: affect appropriate, memory intact.  Mild anxiety noted.  Lower extremities: Patient has compression stockings on.  She has significant varicose veins bilaterally.  She has mild tenderness to palpation of the left medial calf.  Show varicose veins are palpable and lumpy.  We discussed doing ultrasound to evaluate for superficial phlebitis.      Data Review:    Analysis and Summary of Old Records (2): Yes    Records Requested (1): No    Other History Summarized (from other people in the room) (2): *No    Radiology Tests Summarized (XRAY/CT/MRI/DXA) (1): No    Labs Reviewed (1): Yes    Medicine Tests Reviewed (EKG/ECHO/COLONOSCOPY/EGD) (1): No    Independent Review of EKG or X-RAY (2): No

## 2021-06-14 NOTE — PROGRESS NOTES
Mental Health Visit Note    11/17/2017    Start time: 10:00    Stop Time: 10:50   Session # 15    Ora Og is a 77 y.o. female is being seen today for a follow-up psychotherapy appointment    Chief Complaint   Patient presents with     MH Follow Up     Anxiety   .     New symptoms or complaints:  Pt dealing with aftereffects of recent surgery diminished. relief over getting daughter placed in DD home.     Functional Impairment:   The patient identifies the how daily stressors affect daily functioning in today's session as follows (Scale is 1-4, 1=not at all or rarely; 4=extremely so/everyday.)    Personal: 2  Family: 2  Social: 2  Work: retired    Clinical assessment of mental status:   Ora Og presented on time.   She was oriented x3, open and cooperative, and dressed appropriately for this session and weather. Her memory was Normal cognitive functioning .  Her speech was  Within normal.  Language was talkative, spontaneous, normal.  Concentration and focus is Within normal. Psychosis is not noted or reported. She reports her mood is Congruent w/content of speech.  Affect is congruent with speech and is Congruent w/content of speech.  Fund of knowledge is adequate. Insight is adequate for therapy.      Suicidal/Homicidal Ideation present:   No,  Patient denies suicidal and homicidal ideations/means or plans.      Patient's impression of their current status:  Pt is making progress. She cont tp deal with med issues and low hemoglobin. Spent thanksgiving holiday with daughter. Dealing with emotions using coping skills and CBT. 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  Plans to go out to do things on own vs ask him to join her.   Therapist impression of patient's current state:   Ora Og presents with less anxiety and turning over caretaking duties toward family to others as result of surgery. Feels less tired and depression sx and sometimes feels selfish for taking care of self. Pt wants to improve health but all strategies she mentioned involved helping others and fullfilling additional obligations and vol activities vs time for self. Discussed poss of a woman's retreat at [facility] but pt not interested.     Type of psychotherapeutic technique provided:   Insight oriented, Client centered and Solution-focused    Progress toward short term goals: attended  session, attending [facility]    Review of long term goals: to improve health and avoid enabling.  .    Diagnosis:   1. Adjustment disorder with anxious mood    Improving     Plan and Follow-up:   Patient will follow safety plan of seeking help from friend/family, calling [facility], calling 911, and/or presenting to the ED if necessary.  Patient will be compliant with medications and attend appointments as scheduled.  Follow recommendations of medical providers  Take time each day to care for self during surgery recovery    Discharge Criteria/Planning: Patient will continue with follow-up until therapy can be discontinued without return of signs and symptoms.    Signatures:   Performed and documented by Wali Carrion, TABBY, LICSW, LADC          This note was created with help of Dragon dictation software. Grammatical / typing errors are not intentional and inherent to the software.

## 2021-06-15 NOTE — PROGRESS NOTES
"Mental Health tele Visit Note    Patient: Ora Og    : 1940 MRN: [id number]    Date: 3/4/2021    Start time: 10:00 Stop Time: 10:45   Session # 13    The patient has been notified of the following: pt provided  and address to confirm ID.  \"We have found that certain health care needs can be provided without the need for a face to face visit.  This service lets us provide the care you need with a phone conversation.  I will have full access to your [facility] medical record during this entire phone call.   I will be taking notes for your medical record. Since this is like an office visit, we will bill your insurance company for this service.  There are potential benefits and risks of telephone visits (e.g. limits to patient confidentiality) that differ from in-person visits.?  Confidentiality still applies for telephone services, and nobody will record the visit.  It is important to be in a quiet, private space that is free of distractions (including cell phone or other devices) during the visit.?? If during the course of the call I believe a telephone visit is not appropriate, you will not be charged for this service\"  Consent has been obtained for this service by care team member: Yes, per verbal agreement     Session Type: Patient is participating in a telephone visit pt lacks technological skills to do video visit.     Chief Complaint   Patient presents with     MH Follow Up     telephone session     Anxiety     responsibilities, commitments, family     Depression       New symptoms or complaints: feeling overextended in some areas of life    Functional Impairment:   Personal: 2  Family: 2  Work: 2  Social:2          ASSESSMENT: Current Emotional / Mental Status (status of significant symptoms):             Pt denies any SI, intent or plan              Patient reports safe               Patient denies current or recent suicidal ideation or behaviors.              Patient denies current or recent " homicidal ideation or behaviors.              Patient denies current or recent self injurious behavior or ideation.              Patient denies other safety concerns.                    Recommended that patient call 911 or go to the local ED should there be a change in any of these risk factors.                Attitude:                                   Cooperative               Orientation:                             All              Speech                          Rate / Production:       Normal                           Volume:                       Normal               Mood:                                      Normal              Thought Content:                    Clear               Thought Form:                        Coherent  Logical               Insight:                                     Good       Patient's impression of their current status: Pt reports working to find balance and maintain it despite multiple responsibilities that she is faced with on daily basis. Pt able to be assertive and meet needs for rest and relaxation but notes stress over 's deterioration and in taking care of DD daughters needs. Working to transfer some responsibilities to others but enjoys being involved and helping out family and community as way of feeling worthy and connected and engaged in life. Will cont to work to find balance to be busy enough to avoid boredom but also rested enough to feel relaxed vs overwhelmed.   Pt informed that this writer will retire first week in May and will discuss cont care options at next session.  Finds therapy helpful in discussing concerns to gain increased insight, support and discharging some of the stress she feels being engaged in life.       Therapist impression of patients current state: Pt discussed stressors and feelings about how she is doing in maintaining balance. Pt reported therapy has been helpful and is saddened that this writer will be retiring and will discuss  next session plans for cont care.  Pt using serenity prayer. Pt notes continued appreciation for life and overall gratitude, acceptance, balancing life to pace self through stressful times. She continues using prayer, family exercise to maintain health and emotional stability, she cont working with this writer to use CBT and mindfulness and logotherapy to deal with stressors. She  reports therapy very helpful and reorienting and letting go of what she can't control and feeling OK with that. She continues processing stressors in therapy and is doing well during pandemic, using spirituality to accept what she can't change. Reports therapy helpful in lessening anxiety. Going to communion, prayer, online services, [employer] zoom, exercise, being loving and communicating with family.      Type of psychotherapeutic technique provided: Client centered and CBT, spirituality    Progress toward short term goals:Progress as expected, Pt discussing concerns and coping strategies during tele-sessions. Pt working on homework assignments and skills learned in therapy between sessions. Finding greater self acceptance and balance in life    Review of long term goals: To care for self during times of stress while also caretaking . 3/4/2021         Diagnosis:  1. BREN (generalized anxiety disorder)    2. Caregiver stress      improved    Plan and Follow up: To continue to work on personal goals. To transition care to alternative supports and services. To complete termination of therapy with this writer. To continue participating in tele-therapy until face-to-face meetings are allowed. To maintain mental health and physical health through this period of heightened health concerns related to corona virus. To remain medication compliant. To call this psychotherapist if additional psychotherapy sessions are needed due to environmental stressors.       Discharge Criteria/Planning: Patient will continue with follow-up until therapy can be  discontinued without return of signs and symptoms.              I have reviewed the note as documented above.  This accurately captures the substance of my conversation with the patient.  Wali Carrion, LICSW  3/4/2021

## 2021-06-15 NOTE — PROGRESS NOTES
"Outpatient Mental Health Treatment Plan      Name:  Ora Og  :  1940  MRN:  [id number]      Treatment Plan:  Updated Treatment Plan  Intake/initial treatment plan date:  2017      Benefit and risks and alternatives have been discussed: Yes  Is this treatment appropriate with minimal intrusion/restrictions: Yes  Estimated duration of treatment:  Initial trial of 20 sessions, to be reviewed.  Anticipated frequency of services:  Every 3 weeks  Necessity for frequency: This frequency is needed to maintain progress with therapeutic goals and for continuity of care in order to monitor progress.  Necessity for treatment: To address cognitive, behavioral, and/or emotional barriers in order to work toward goals and to improve quality of life.          Plan:                                                                                                                                           ?                        ? Anxiety                                            Goal:              Decrease average anxiety level from 6 to 3.                        Strategies:                 ? [x]Continue to practice relaxation techniques and other coping                                                                       strategies (e.g., thought stopping, reframing, meditation)                                                                    ? [x] Increase involvement in activities that bring you pleasure                                                                    ? [x] Practice healthy boundaries by saying \"no\" to requests you don't feel comfortable taking on                                                                    ? [x] Continue to explore thoughts and expectations about self and others                                                                    ? [x] Identify and monitor triggers for panic/anxiety " symptoms                                                                    ? [] Implement physical activity routine (with physician approval)                                                                    ? [x] Read daily meditation from Nolvia and review with therapist                                                                    ? [x] Continue compliance with medical treatment plan (or explore                                                                    barriers)                                                               Degree to which this is a problem (1-4): 2   Degree to which goal is met (1-4) 2  Date of Review: 2/2/18    Adjustment to alcoholism of son                                      Goal:              Increase % acceptance from 85 to 95.                        Strategies:                 ?[x]  Explore thoughts and expectations about self and others                                                         [x] Cont to explore emotional reactions to alcohol-related problems                                                                    ?[x] Learn and practice relaxation techniques and other coping                                                                       strategies                                                                    [x] Implement physical activity routine (with physician approval)                                                          [x] Continue involvement in Alanon by attending weekly meetings                                                          [x] Engage in values clarification and goal-setting with self and therapist by completing assignments related to values and goals                                                                    [x] Engage in Alanon discussions and readings to develop supports and insights          Date of Review: 2/2/18    Functional Impairment: 1=Not at all/Rarely  2=Some days  3=Most Days  4=Every Day   Personal:  2  Family: 2  Social: 2  Work: 0      Diagnosis:   Adjustment Disorder with mixed anxiety and depressed mood, chronic kidney disease, hypertension, GERD    WHODAS 2.0 12-item version= 10  H1= 15  H2= 0  H3= 10  Clinical assessments completed: BREN-7, PHQ-9, Mood Questionnaire current, CAGE-AID, PANSI.          Strengths:  Intelligent, caring,healthy, active, good listener, strong desire to find solutions, loving , involved with social network  Limitations:  Severe anxiety, depression, limited ability to change others  Cultural Considerations:  Ora  is a   female with self determination to feel better. She  uses Western medicine and has a strong Christianity  [place] . She  has health insurance is able to navigate the system.          Persons responsible for this plan:  ? [x] Patient                     ? [] Provider                   ? [] Other: __________________          Provider:Performed and documented by MARGARITA Whitley LICSW; [facility] 02/05/2018      Date:  02/05/2018  Time:  9:14 PM              Patient Signature:____________________________________ Date: ______________         Therapist Signature: __________________________________ Date: ______________

## 2021-06-15 NOTE — PROGRESS NOTES
"Mental Health tele Visit Note    Patient: Ora Og    : 1940 MRN: [id number]    Date: 2021    Start time: 10:00 Stop Time: 10:45   Session # 12    The patient has been notified of the following:   \"We have found that certain health care needs can be provided without the need for a face to face visit.  This service lets us provide the care you need with a phone conversation.  I will have full access to your [place] medical record during this entire phone call.   I will be taking notes for your medical record. Since this is like an office visit, we will bill your insurance company for this service.  There are potential benefits and risks of telephone visits (e.g. limits to patient confidentiality) that differ from in-person visits.?  Confidentiality still applies for telephone services, and nobody will record the visit.  It is important to be in a quiet, private space that is free of distractions (including cell phone or other devices) during the visit.?? If during the course of the call I believe a telephone visit is not appropriate, you will not be charged for this service\"  Consent has been obtained for this service by care team member: Yes, per verbal agreement     Session Type: Patient is participating in a telephone visit pt lacks technological skills to do video visit.     Chief Complaint   Patient presents with      Follow Up     telephone session     Depression     weather     Anxiety       New symptoms or complaints: 's medical challenges    Functional Impairment:   Personal: 2  Family: 2  Work: 2  Social:2          ASSESSMENT: Current Emotional / Mental Status (status of significant symptoms):             Pt denies any SI, intent or plan              Patient reports safe               Patient denies current or recent suicidal ideation or behaviors.              Patient denies current or recent homicidal ideation or behaviors.              Patient denies current or recent self " injurious behavior or ideation.              Patient denies other safety concerns.                    Recommended that patient call 911 or go to the local ED should there be a change in any of these risk factors.                Attitude:                                   Cooperative               Orientation:                             All              Speech                          Rate / Production:       Normal                           Volume:                       Normal               Mood:                                      Normal              Thought Content:                    Clear               Thought Form:                        Coherent  Logical               Insight:                                     Good       Patient's impression of their current status: Pt reports hopeful for new year with new president. persevering. Processed coping skills she is using to deal with pandemic. Trying to accept limits of control while also being supportive of family and community. Processed some regrets from past. Has appt to discuss 's meds with pharm at [place] on Friday. Maintaining balance, assertiveness despite constraints and pressures of pandemic.   Finds therapy helpful in discussing concerns to gain increased insight, support and discharging some of the stress she feels being engaged in life.       Therapist impression of patients current state: Pt discussed stressors and feelings about events and family struggles. Pt notes continued appreciation for life and overall gratitude, acceptance, balancing life to pace self through stressful times. She continues using prayer, family exercise to maintain health and emotional stability, she cont working with this writer to use CBT and mindfulness and logotherapy to deal with stressors. She  reports therapy very helpful and reorienting and letting go of what she can't control and feeling OK with that. She continues processing stressors in therapy and is  doing well during pandemic, using spirituality to accept what she can't change. Reports therapy helpful in lessening anxiety. Going to communion, prayer, online services, famiy zoom, exercise, being loving and communicating with family.      Type of psychotherapeutic technique provided: Client centered and CBT, spirituality    Progress toward short term goals:Progress as expected, Pt discussing concerns and coping strategies during tele-sessions. Pt working on homework assignments and skills learned in therapy between sessions    Review of long term goals: To care for self during times of stress while also caretaking . 2/4/2021         Diagnosis:  1. BREN (generalized anxiety disorder)    2. Caregiver stress      improved    Plan and Follow up: To continue to work on personal goals. To continue participating in tele-therapy until face-to-face meetings are allowed. To maintain mental health and physical health through this period of heightened health concerns related to corona virus. To remain medication compliant. To call this psychotherapist if additional psychotherapy sessions are needed due to environmental stressors.       Discharge Criteria/Planning: Patient will continue with follow-up until therapy can be discontinued without return of signs and symptoms.              I have reviewed the note as documented above.  This accurately captures the substance of my conversation with the patient.  Wali Carrion, [facility]  2/4/2021

## 2021-06-15 NOTE — PROGRESS NOTES
"Mental Health Visit Note    1/5/2017    Start time: 10:00    Stop Time: 10:50   Session # 17    Ora Og is a 77 y.o. female is being seen today for a follow-up psychotherapy appointment    Chief Complaint   Patient presents with      Follow Up   .     New symptoms or complaints:  Pt working on assertiveness and stress of balancing family needs    Functional Impairment:   The patient identifies the how daily stressors affect daily functioning in today's session as follows (Scale is 1-4, 1=not at all or rarely; 4=extremely so/everyday.)    Personal: 2  Family: 2  Social: 2  Work: retired    Clinical assessment of mental status:   Ora Og presented on time.   She was oriented x3, open and cooperative, and dressed appropriately for this session and weather. Her memory was Normal cognitive functioning .  Her speech was  Within normal.  Language was talkative, spontaneous, normal.  Concentration and focus is Within normal. Psychosis is not noted or reported. She reports her mood is Congruent w/content of speech.  Affect is congruent with speech and is Congruent w/content of speech.  Fund of knowledge is adequate. Insight is adequate for therapy.      Suicidal/Homicidal Ideation present:   No,  Patient denies suicidal and homicidal ideations/means or plans.      Patient's impression of their current status:  Pt is making progress. She is healthier, she cont to set boundaries but struggles with saying \"no\" in situations when asked to take on community volunteer activities.  She acknowledged her devotion to the Oriental orthodox mle makes it harder for her to say no because she sees her Confucianist as directing her to be helpful to others and to sacrifice herself or others.  She acknowledges that when she says yes to think she would rather say no to she ends up feeling resentful and angry at herself.  We talked about pros and cons of setting boundaries and discussed ways of doing so.  We role played situations that she " is likely to face in being asked to volunteer for community activities and ways to say no in a way that is respectful.  We also talked about how her saying no to others impacts that and she acknowledged that may be helpful to them and allow them to develop better coping skills in preparation skills themselves.  We continue to look at an assignment she had done related to being a mother or sister neighbor etc. and her ideals and expectations for herself and others and those relationships.  She tends to take responsibility for others and feels like she needs to fail people out of difficult situations.  We continue to work on locus of control and responsibility perceptions.  We talked about family members and difficulty that she has with some of her sisters and her brother who are not as Jewish as her.  She has 1 sister who tends to make fun of her for her Jewish views.  This hurts patient's feelings and so she is not as close as she would like to be with that sibling.      Therapist impression of patient's current state:   Ora Og presents with less anxiety and turning over caretaking duties toward family to others. Has placed daughter in DD home and lets son reach out to her. Thinking of ways to reconnect with daughters without feeling guilt for their estrangement. More aware of ways of setting boundaries with community members and friends as well as family.  Ports great satisfaction with her life.    Type of psychotherapeutic technique provided:   Insight oriented, Client centered and Solution-focused    Progress toward short term goals: attended  session, reina loja    Review of long term goals: to improve health and avoid enabling.  .    Diagnosis:   1. Adjustment disorder with anxious mood    Improving     Plan and Follow-up:   Patient will follow safety plan of seeking help from friend/family, calling [facility] crisis, calling 911, and/or presenting to the ED if necessary.  Patient will be  compliant with medications and attend appointments as scheduled.  Follow recommendations of medical providers  cont process of reconnecting with estranged daughters    Discharge Criteria/Planning: Patient will continue with follow-up until therapy can be discontinued without return of signs and symptoms.    Signatures:   Performed and documented by TABBY Mancini, [facility], [facility]          This note was created with help of Dragon dictation software. Grammatical / typing errors are not intentional and inherent to the software.

## 2021-06-15 NOTE — PROGRESS NOTES
Mental Health Visit Note    1/5/2017    Start time: 10:00    Stop Time: 10:50   Session # 16    Ora Og is a 77 y.o. female is being seen today for a follow-up psychotherapy appointment    Chief Complaint   Patient presents with     Anxiety   .     New symptoms or complaints:  Pt working on assertiveness and stress of balancing family needs    Functional Impairment:   The patient identifies the how daily stressors affect daily functioning in today's session as follows (Scale is 1-4, 1=not at all or rarely; 4=extremely so/everyday.)    Personal: 2  Family: 2  Social: 2  Work: retired    Clinical assessment of mental status:   Ora Og presented on time.   She was oriented x3, open and cooperative, and dressed appropriately for this session and weather. Her memory was Normal cognitive functioning .  Her speech was  Within normal.  Language was talkative, spontaneous, normal.  Concentration and focus is Within normal. Psychosis is not noted or reported. She reports her mood is Congruent w/content of speech.  Affect is congruent with speech and is Congruent w/content of speech.  Fund of knowledge is adequate. Insight is adequate for therapy.      Suicidal/Homicidal Ideation present:   No,  Patient denies suicidal and homicidal ideations/means or plans.      Patient's impression of their current status:  Pt is making progress. She is healthier, she set boundaries with family during holiday setting it up so that she was not overextended. Would like to reconnect with 2 daughters who are estranged from her and discussed ways of reconnecting. More stable and comfortable acknowledging anger and disagreement with others. Daughter Jade has been placed in DD home that is working out well. Son Nakul still sober and looking for transistional housing.     Therapist impression of patient's current state:   Ora Og presents with less anxiety and turning over caretaking duties toward family to others. Has placed  daughter in DD home and lets son reach out to her. Thinking of ways to reconnect with daughters without feeling guilt for their estrangement. More aware she did best she could raising 15 children. No regrets.     Type of psychotherapeutic technique provided:   Insight oriented, Client centered and Solution-focused    Progress toward short term goals: attended  session, attending freedom    Review of long term goals: to improve health and avoid enabling.  .    Diagnosis:   1. Adjustment disorder with anxious mood    Improving     Plan and Follow-up:   Patient will follow safety plan of seeking help from friend/family, calling [facility], calling 911, and/or presenting to the ED if necessary.  Patient will be compliant with medications and attend appointments as scheduled.  Follow recommendations of medical providers  Begin process of reconnecting with estranged daughters    Discharge Criteria/Planning: Patient will continue with follow-up until therapy can be discontinued without return of signs and symptoms.    Signatures:   Performed and documented by Wali Carrion, TABBY, LICSW, LADC          This note was created with help of Dragon dictation software. Grammatical / typing errors are not intentional and inherent to the software.

## 2021-06-15 NOTE — PROGRESS NOTES
"Outpatient Mental Health Treatment Plan      Name:  Ora Og  :  1940  MRN:  [id number]      Treatment Plan:  Updated Treatment Plan  Intake/initial treatment plan date:  2017      Benefit and risks and alternatives have been discussed: Yes  Is this treatment appropriate with minimal intrusion/restrictions: Yes  Estimated duration of treatment:  Trial of 30 sessions, to be reviewed.  Anticipated frequency of services:  Every 4 weeks  Necessity for frequency: This frequency is needed to maintain progress with therapeutic goals and for continuity of care in order to monitor progress.  Necessity for treatment: To address cognitive, behavioral, and/or emotional barriers in order to work toward goals and to improve quality of life.          Plan:                                                                                                                                           ?                        ? Anxiety                                            Goal:              Maintain average anxiety level from 2 to 1.                        Strategies:                 ? [x]?????Continue to practice relaxation techniques and other coping                                                                       strategies (e.g., thought stopping, reframing, meditation)                                                                    ? [x]????? Continue involvement in activities that bring you pleasure                                                                    ? [x]????? Practice healthy boundaries by saying \"no\" to requests you don't feel comfortable taking on                                                                    ? [x]????? Continue to explore thoughts and expectations about self and others                                                                    ? [x]????? Identify and monitor triggers for panic/anxiety " symptoms                                                                    ? [x]????? Implement physical activity routine on consistent basis                                                                    ? [x]????? Practice meditation and prayer on daily basis to increase connection with caring higher power                                                                    ? [x]????? Continue compliance with medical treatment plan                              Degree to which this is a problem (1-4): 1   Degree to which goal is met (1-4) 4  Date of Review: 3/4/2021                          Adjustment to change in family roles                                     Goal:              Maintain % acceptance at 95% for changes you are going through during this stage of life.                        Strategies:                 ?[x]?????  Explore thoughts and expectations about self and others                                                         [x]????? Cont to explore emotional reactions to alcohol-related problems                                                                    ?[x]????? Learn and practice relaxation techniques and other coping                                                                       strategies                                                                    [x]????? Implement physical activity routine (with physician approval)                                                          [x]????? Continue involvement in community activities of your choosing                                                          [x]????? Engage in values clarification and goal-setting with self and therapist by completing assignments related to values and goals                                                                    [x]????? Make efforts to feel confident putting your needs first when appropriate          Date of Review:3/4/2021                         Functional Impairment: 1=Not at  all/Rarely  2=Some days  3=Most Days  4=Every Day   Personal:1  Family: 2  Social: 1  Work: 0      Diagnosis:  BREN         Clinical assessments completed: BREN-7, PHQ-9, CGI  CSSRI CGI      BREN-7: 4  PHQ-9: 1      Strengths:  Intelligent, caring,healthy, active, good listener, strong desire to find solutions, loving , involved with social network  Limitations:   anxiety, depression, caregiver stress  Cultural Considerations:  Ora  is a   female with self determination to feel better. She  uses Western medicine and has a strong Restoration  [place] . She  has health insurance is able to navigate the system.          Persons responsible for this plan:  ? [x]????? Patient                     ? [x]????? Provider                   ? []????? Other: __________________          Provider:Performed and documented by MARGARITA Whitley; [facility] 3/4/2021                                                       Time:  10:00AM              Patient Signature:____________________________________ Date: ______________           Therapist Signature: __________________________________  Date:____________

## 2021-06-16 PROBLEM — K56.52: Status: ACTIVE | Noted: 2017-10-18

## 2021-06-16 PROBLEM — K22.70 BARRETT'S ESOPHAGUS WITHOUT DYSPLASIA: Status: ACTIVE | Noted: 2017-11-27

## 2021-06-16 PROBLEM — Z63.6 CAREGIVER STRESS: Status: ACTIVE | Noted: 2020-09-17

## 2021-06-16 PROBLEM — D64.9 ANEMIA: Status: ACTIVE | Noted: 2017-11-27

## 2021-06-16 NOTE — PROGRESS NOTES
[facility] Follow Up Note    Assessment/Plan:    1. HTN (hypertension)  Continue on 5 mg of amlodipine.  Refill was provided  - amLODIPine (NORVASC) 5 MG tablet; Take 1 tablet (5 mg total) by mouth daily.  Dispense: 90 tablet; Refill: 3    2.  History of past up anemia, and postop phlebitis  Etiology of anemia is unclear, she had lowest hemoglobin of 7.6 and rebounded by itself.  Will repeat CBC again today.  Patient is also has propensity for postoperative clotting and had history of DVT and PE in the past and most recently phlebitis which has resolved on aspirin.  In the future should she require elective surgery she will need a hematology consult to see how to deal with those issues postoperatively.  - HM1(CBC and Differential)  - Iron and Transferrin Iron Binding Capacity  - Vitamin B12  - HM1 (CBC with Diff)    3.  Smith's esophagus.  Patient denies any acid reflux.  Biopsy did not show any inflammation or atypia.  She should have repeat endoscopy done in November of this year which would be a year.  She has slight hiatal hernia.  I am not sure if she would benefit from chronic PPI given absence of gastritis or inflammation.  She is a non-smoker but has been exposed to a lot of secondhand smoking as a kid.    Jessica Diaz MD    Chief Complaint:  Chief Complaint   Patient presents with     Follow-up     Anemia     Hypertension     heartrate irregular       History of Present Illness:  Ora is a 77 y.o. female with history of mild kidney insufficiency, previous cecal volvulus requiring ileostomy, postoperative DVT and PE, cholecystectomy, history of rheumatic fever and seasonal allergies, past operative recurrent anemia.  She is currently here for follow up.    Most recently patient had small bowel obstruction and required lysis of adhesion in October 2017.  Postoperatively she developed severe anemia of unclear etiology with hemoglobin as low as 7.6.  Patient was not bleeding and anemia has  resolved spontaneously.  A month after her surgery she developed phlebitis in her left leg.  Ultrasound at that time did not show any DVT.  She has taken high-dose aspirin and symptoms have resolved.  Currently she is here to have her hemoglobin level repeated.  She did see hematologist and we still do not know why she had this anemia.  She is on multivitamin with iron.  She reports that she is feeling well.  Because she has history of previous postop PE and DVT she is on chronic small dose of aspirin.    Patient does have propensity for postoperative clotting.  Initially she had PE and DVT during complex hospitalization when she had cecal volvulus and required ileostomy.  Most recently she had surgery for lysis of adhesion for small bowel obstruction and developed phlebitis a month later.  Ultrasound did not show any DVTs.  At the same time she also has severe postoperative anemia of unclear etiology.  If she ever requires elective surgery she would be at a high risk for repeat postoperative anemia as well as clotting.  We might need a hematology consult to see whether they would recommend Lovenox postop.    Patient has a very incompetent deep veins.  On exam she has a lot of hard cords palpated in both of her legs.  She had ultrasound done in 2016 which did not show any chronic DVTs and she saw vascular surgeon at that time.  Most recently because she was having more pain repeated ultrasound which showed superficial phlebitis but no DVT.  She uses compression stockings and is on a small dose of aspirin.    Because she had such severe anemia and elevated BUN and history of aspirin she had recently had endoscopy done.  It did not show any gastritis but she did have small hiatal hernia and evidence of Smith's esophagus without ITP.  Currently she denies any acid reflux symptoms.  Biopsies were negative for inflammation.  Discussed that she will need to repeat endoscopy in a year (that would be November of this  "year) to follow-up on the Smith's esophagus.  At this point it is unclear whether she would benefit from chronic PPI due to hiatal hernia although she had no evidence of mucosal inflammation on initial endoscopy.    Review of Systems:  A comprehensive review of systems was performed and was otherwise negative.  Patient has lost 10 pounds after recent hospitalization but currently her weight has stabilized.  She denies any abdominal pain constipation diarrhea blood in the stool.    PFSH:  Social History: Viewed  History   Smoking Status     Never Smoker   Smokeless Tobacco     Never Used     Social History     Social History Narrative    Patient is .  She has 15 children who are all grown up.    She enjoys walking daily for exercise.        Past History: reviewed  Current Outpatient Prescriptions   Medication Sig Dispense Refill     amLODIPine (NORVASC) 5 MG tablet Take 1 tablet (5 mg total) by mouth daily. 90 tablet 3     aspirin 81 MG EC tablet Take 81 mg by mouth daily.       calcium carb &cit-vit D3 (CITRACAL + D SLOW RELEASE) 600 mg calcium- 500 unit TbER Take 600 mg by mouth 2 (two) times a day.        fluticasone (FLONASE) 50 mcg/actuation nasal spray SPRAY 2 SPRAYS INTO EACH NOSTRILS DAILY 16 g 6     PROPYLENE GLYCOL//PF (SYSTANE, PF, OPHT) Apply 2 drops to eye as needed. Both eyes        therapeutic multivitamin (THERAGRAN) tablet Take 1 tablet by mouth daily.       UNABLE TO FIND Med Name:Fungi-nail liquid- Apply twice a day       UNABLE TO FIND Med Name:Formula 3, apply twice a day       No current facility-administered medications for this visit.        Physical Exam:    Vitals:    03/06/18 1124   BP: 130/74   Patient Site: Left Arm   Patient Position: Sitting   Cuff Size: Adult Regular   Pulse: 94   Resp: 16   SpO2: 99%   Weight: 114 lb 9 oz (52 kg)   Height: 5' 2\" (1.575 m)     Wt Readings from Last 3 Encounters:   03/06/18 114 lb 9 oz (52 kg)   11/27/17 115 lb 2 oz (52.2 kg)   11/21/17 " 113 lb 3.2 oz (51.3 kg)     Body mass index is 20.95 kg/(m^2).    Constitutional:  Reveals a pleasant female.  Vitals:  Per nursing notes.  HEENT:No cervical LAD, no thyromegaly,  conjunctiva is pink, no scleral icterus, TMs are visualized and normal bl, oropharynx is clear, no exudates,   Cardiac:  Regular rate and rhythm,no murmurs, rubs, or gallops.  Mild sinus tachycardia noted. Legs without edema. Palpation of the radial pulse regular.  Lungs: Clear to auscultation bl.  Respiratory effort normal.  Abdomen:positive BS, soft, nontender, nondistended.  No hepato-splenomagaly  Skin:   Without rash, bruise, or palpable lesions.  Rheumatologic: Normal joints and nails of the hands.  Neurologic:  Cranial nerves II-XII intact.     Psychiatric: affect appropriate, memory intact.     Data Review:    Analysis and Summary of Old Records (2): Yes    Records Requested (1): No    Other History Summarized (from other people in the room) (2): No    Radiology Tests Summarized (XRAY/CT/MRI/DXA) (1): Yes lower extremity ultrasound    Labs Reviewed (1): Yes    Medicine Tests Reviewed (EKG/ECHO/COLONOSCOPY/EGD) (1): Yes EGD    Independent Review of EKG or X-RAY (2): No

## 2021-06-16 NOTE — PROGRESS NOTES
"Mental Health tele Visit Note    Patient: Ora Og    : 1940 MRN: [id number]    Date: 2021    Start time: 10:00 Stop Time: 10:45   Session # 14    The patient has been notified of the following: pt provided  and address to confirm ID.  \"We have found that certain health care needs can be provided without the need for a face to face visit.  This service lets us provide the care you need with a phone conversation.  I will have full access to your [facility] medical record during this entire phone call.   I will be taking notes for your medical record. Since this is like an office visit, we will bill your insurance company for this service.  There are potential benefits and risks of telephone visits (e.g. limits to patient confidentiality) that differ from in-person visits.?  Confidentiality still applies for telephone services, and nobody will record the visit.  It is important to be in a quiet, private space that is free of distractions (including cell phone or other devices) during the visit.?? If during the course of the call I believe a telephone visit is not appropriate, you will not be charged for this service\"  Consent has been obtained for this service by care team member: Yes, per verbal agreement     Session Type: Patient is participating in a telephone visit pt lacks technological skills to do video visit.     Chief Complaint   Patient presents with     MH Follow Up     telephone     Anxiety     multitasking       New symptoms or complaints: none    Functional Impairment:   Personal: 2  Family: 1  Work: 1  Social:1          ASSESSMENT: Current Emotional / Mental Status (status of significant symptoms):             Pt denies any SI, intent or plan              Patient reports safe               Patient denies current or recent suicidal ideation or behaviors.              Patient denies current or recent homicidal ideation or behaviors.              Patient denies current or recent self " injurious behavior or ideation.              Patient denies other safety concerns.                    Recommended that patient call 911 or go to the local ED should there be a change in any of these risk factors.                Attitude:                                   Cooperative               Orientation:                             All              Speech                          Rate / Production:       Normal                           Volume:                       Normal               Mood:                                      Normal              Thought Content:                    Clear               Thought Form:                        Coherent  Logical               Insight:                                     Good       Patient's impression of their current status: Pt difficult weeks of figuring out what responsibilities to delegate to others. Involved with community foundation and feeling overwhelmed because others have dropped out. 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 Some conflicts with  who can be insensitive.  She reports working to find balance and maintain it despite multiple responsibilities that she is faced with on daily basis. Pt able to be assertive and meet needs for rest and relaxation but notes stress over impulse to help others despite own limitations. She cont Working to transfer some responsibilities to others but enjoys being involved and helping out family and community as way of feeling worthy and connected and engaged in life. Will cont to work to find balance to be busy enough to avoid boredom but also rested enough to feel relaxed vs overwhelmed.   Pt informed that this writer will retire first week in May and will discussed cont care options. Pt feels much improved, reports therapy has been helpful, may be stable enough to take break but given health concerns of  and children's issues she would like to be able to access therapy in future.  Recommended Ora Rolon, emailed Ora Rolon to see if she has availability, .  Finds therapy helpful in discussing concerns to gain increased insight, support and discharging some of the stress she feels being engaged in life.       Therapist impression of patients current state: Pt discussed progress she has made in therapy and stressors and feelings about how she is doing in maintaining balance. Pt reported therapy has been helpful and is saddened that this writer will be retiring and will discuss next session plans for cont care.  Pt using serenity prayer. Pt notes continued appreciation for life and overall gratitude, acceptance, balancing life to pace self through stressful times. She continues using prayer, family exercise to maintain health and emotional stability, she cont working with this writer to use CBT and mindfulness and logotherapy to deal with stressors. She  reports therapy very helpful and reorienting and letting go of what she can't control and feeling OK with that. She continues processing stressors in therapy and is doing well during pandemic, using spirituality to accept what she can't change. Reports therapy helpful in lessening anxiety. Going to communion, prayer, online services, famipushd zoom, exercise, being loving and communicating with family.      Type of psychotherapeutic technique provided: Client centered and CBT, spirituality    Progress toward short term goals:Progress as expected, Pt discussing concerns and coping strategies during tele-sessions. Pt working on homework assignments and skills learned in therapy between sessions. Finding greater self acceptance and balance in life    Review of long term goals: To care for self during times of stress while also caretaking . 4/1/2021         Diagnosis:  1. BREN (generalized anxiety disorder)      improved    Plan and Follow up: To continue to work on personal goals. To transition care to alternative supports and services. To  complete termination of therapy with this writer. To continue participating in tele-therapy until face-to-face meetings are allowed. To maintain mental health and physical health through this period of heightened health concerns related to corona virus. To remain medication compliant. To call this psychotherapist if additional psychotherapy sessions are needed due to environmental stressors.       Discharge Criteria/Planning: Patient will continue with follow-up until therapy can be discontinued without return of signs and symptoms.              I have reviewed the note as documented above.  This accurately captures the substance of my conversation with the patient.  Wali Carrion, [facility] Health CareSW  4/1/2021

## 2021-06-16 NOTE — PROGRESS NOTES
"Mental Health tele Visit Note    Patient: Ora Og    : 1940 MRN: [id number]    Date: 4/15/21    Start time: 10:00 Stop Time: 10:45   Session # 15    The patient has been notified of the following: pt provided  and address to confirm ID.  \"We have found that certain health care needs can be provided without the need for a face to face visit.  This service lets us provide the care you need with a phone conversation.  I will have full access to your [facility] medical record during this entire phone call.   I will be taking notes for your medical record. Since this is like an office visit, we will bill your insurance company for this service.  There are potential benefits and risks of telephone visits (e.g. limits to patient confidentiality) that differ from in-person visits.?  Confidentiality still applies for telephone services, and nobody will record the visit.  It is important to be in a quiet, private space that is free of distractions (including cell phone or other devices) during the visit.?? If during the course of the call I believe a telephone visit is not appropriate, you will not be charged for this service\"  Consent has been obtained for this service by care team member: Yes, per verbal agreement     Session Type: Patient is participating in a telephone visit pt lacks technological skills to do video visit.     Chief Complaint   Patient presents with     MH Follow Up     Anxiety       New symptoms or complaints: none    Functional Impairment:   Personal: 2  Family: 1  Work: 1  Social:1          ASSESSMENT: Current Emotional / Mental Status (status of significant symptoms):             Pt denies any SI, intent or plan              Patient reports safe               Patient denies current or recent suicidal ideation or behaviors.              Patient denies current or recent homicidal ideation or behaviors.              Patient denies current or recent self injurious behavior or ideation.      "         Patient denies other safety concerns.                    Recommended that patient call 911 or go to the local ED should there be a change in any of these risk factors.                Attitude:                                   Cooperative               Orientation:                             All              Speech                          Rate / Production:       Normal                           Volume:                       Normal               Mood:                                      Normal              Thought Content:                    Clear               Thought Form:                        Coherent  Logical               Insight:                                     Good       Patient's impression of their current status: Pt reports improved mental health, able to detach and cope well with anxiety despite life stressors.  She reports able to find balance and maintain it despite multiple responsibilities that she is faced with on daily basis. Pt able to be assertive and meet needs for rest and relaxation but notes stress over impulse to help others despite own limitations. She cont Working to transfer some responsibilities to others but enjoys being involved and helping out family and community as way of feeling worthy and connected and engaged in life. Will cont to work to find balance to be busy enough to avoid boredom but also rested enough to feel relaxed vs overwhelmed.   Pt able to manage life without therapy moving forward.     Therapist impression of patients current state: Pt discussed progress she has made in therapy and stressors and feelings about how she is doing in maintaining balance. Pt reported therapy has been helpful and is saddened that this writer will be retiring and will discuss next session plans for cont care.  Pt using serenity prayer. Pt notes continued appreciation for life and overall gratitude, acceptance, balancing life to pace self through stressful times. She  continues using prayer, family exercise to maintain health and emotional stability, she cont working with this writer to use CBT and mindfulness and logotherapy to deal with stressors. She  reports therapy very helpful and reorienting and letting go of what she can't control and feeling OK with that. She continues processing stressors in therapy and is doing well during pandemic, using spirituality to accept what she can't change. Reports therapy helpful in lessening anxiety. Going to communion, prayer, online services, famiy zoom, exercise, being loving and communicating with family.      Type of psychotherapeutic technique provided: Client centered and CBT, spirituality    Progress toward short term goals:Progress as expected, Pt discussing concerns and coping strategies during tele-sessions. Pt working on homework assignments and skills learned in therapy between sessions. Finding greater self acceptance and balance in life    Review of long term goals: To care for self during times of stress while also caretaking . 4/16/2021         Diagnosis:  1. Caregiver stress      improved    Plan and Follow up: To continue to work on personal goals. To transition care to alternative supports and services. To complete termination of therapy with this writer. To continue participating in tele-therapy until face-to-face meetings are allowed. To maintain mental health and physical health through this period of heightened health concerns related to corona virus. To remain medication compliant. To call this psychotherapist if additional psychotherapy sessions are needed due to environmental stressors.       Discharge Criteria/Planning: Patient will continue with follow-up until therapy can be discontinued without return of signs and symptoms.              I have reviewed the note as documented above.  This accurately captures the substance of my conversation with the patient.  Wali Carrion, [facility] HospitalSW   4/15/21

## 2021-06-16 NOTE — PROGRESS NOTES
"Mental Health Visit Note    1/5/2017    Start time: 10:00    Stop Time: 10:50   Session # 18    Ora Og is a 77 y.o. female is being seen today for a follow-up psychotherapy appointment    Chief Complaint   Patient presents with      Follow Up     Depression     Anxiety   .     New symptoms or complaints:  Pt working on uncovering meaning of dreams and resolving anxiety over childrens choices    Functional Impairment:   The patient identifies the how daily stressors affect daily functioning in today's session as follows (Scale is 1-4, 1=not at all or rarely; 4=extremely so/everyday.)    Personal: 2  Family: 2  Social: 2  Work: retired    Clinical assessment of mental status:   Ora Og presented on time.   She was oriented x3, open and cooperative, and dressed appropriately for this session and weather. Her memory was Normal cognitive functioning .  Her speech was  Within normal.  Language was talkative, spontaneous, normal.  Concentration and focus is Within normal. Psychosis is not noted or reported. She reports her mood is Congruent w/content of speech.  Affect is congruent with speech and is Congruent w/content of speech.  Fund of knowledge is adequate. Insight is adequate for therapy.      Suicidal/Homicidal Ideation present:   No,  Patient denies suicidal and homicidal ideations/means or plans.      Patient's impression of their current status:  Pt is making progress. She is healthier, she cont to set boundaries but struggles with saying \"no\" in situations when asked to take on community volunteer activities.  She shared dream where she was in a school cafeteria with a patch father unknown people wrapping presents and Minella envelopes in a hurried fashion because they needed to get the presence wrapped quickly there was excitement in the air and reminders are asked years of her life when she worked at [employer] as a  for 12 years from 96 2007.  She was in a big round hole " like a school cafeteria and she felt excitement and also some anxiety in trying to hurry up and get the presence wrapped.  The stream occurred 2 days after celebrating her youngest daughter's birthday.  Her youngest daughter is 34 years old and is very laid back.  The tree and then shifted to she was on a brown horse set in a [place] with other people and black robust with her  off all holding packages with name tags like razor Rachael on the front of their role however she could not see the names on the Rachael.  There was lots of hay around it was like being at the [employer] she felt satisfied accomplished and worthy.  She connects to dreams to her tendency to want to give him to find great pleasure and enjoyment and giving he had she also often feels overextended.  The plaque rubs good  symbolize judgment has a good deal of her anxiety is related to judgment she makes about other people's behaviors.  She has children who have gotten divorces and she fears that they will go to hell she has a strong devout Uatsdin mel and her Uatsdin beliefs tentatively to rigid thinking which creates anxiety and irritation and depression in her at times.  This Uatsdin guilt tends to create perfectionism in her.      Therapist impression of patient's current state:   Ora Og presents with less anxiety and turning over caretaking duties toward family to others. Has placed daughter in DD home and lets son reach out to her. Thinking of ways to reconnect with daughters without feeling guilt for their estrangement. More aware of ways of setting boundaries with community members and friends as well as family.  Ports great satisfaction with her life.    Type of psychotherapeutic technique provided:   Insight oriented, Client centered and Solution-focused    Progress toward short term goals: attended  session, reina loja    Review of long term goals: to improve health and avoid enabling.  .    Diagnosis:   1.  Adjustment disorder with anxious mood    Improving     Plan and Follow-up:   Patient will follow safety plan of seeking help from friend/family, calling [facility] crisis, calling 911, and/or presenting to the ED if necessary.  Patient will be compliant with medications and attend appointments as scheduled.  Follow recommendations of medical providers  cont process of reconnecting with estranged daughters    Discharge Criteria/Planning: Patient will continue with follow-up until therapy can be discontinued without return of signs and symptoms.    Signatures:   Performed and documented by TABBY Mancini, LICSW, LADC          This note was created with help of Dragon dictation software. Grammatical / typing errors are not intentional and inherent to the software.

## 2021-06-17 NOTE — PROGRESS NOTES
Mental Health Visit Note    5/7/2018   Start time: 10:00    Stop Time: 10:50   Session # 17    Ora Og is a 77 y.o. female is being seen today for a follow-up psychotherapy appointment    Chief Complaint   Patient presents with      Follow Up     Depression     Anxiety    .     New symptoms or complaints:  Pt reports loneliness as  has become more ill    Functional Impairment:   The patient identifies the how daily stressors affect daily functioning in today's session as follows (Scale is 1-4, 1=not at all or rarely; 4=extremely so/everyday.)    Personal: 2  Family: 2  Social: 2  Work: retired    Clinical assessment of mental status:   Ora Og presented on time.   She was oriented x3, open and cooperative, and dressed appropriately for this session and weather. Her memory was Normal cognitive functioning .  Her speech was  Within normal.  Language was talkative, spontaneous, normal.  Concentration and focus is Within normal. Psychosis is not noted or reported. She reports her mood is Congruent w/content of speech.  Affect is congruent with speech and is Congruent w/content of speech.  Fund of knowledge is adequate. Insight is adequate for therapy.      Suicidal/Homicidal Ideation present:   No,  Patient denies suicidal and homicidal ideations/means or plans.      Patient's impression of their current status:  Pt is making progress. 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 Working on letting go and letting Hira live and experience consequences despite her fears for him.  Therapist impression of patient's current state:   Ora Og presents with some anxiety and turning over caretaking duties toward family. She cont to seek acceptance from others by bending to their will or neglecting own needs and feelings. She sees this tend less as neglect than a virtue, caring for others at her own expence. Having trouble  noting how that creates conflicts both in self and others. Exploring ways of developing better sense of self and of self acceptance. She read Spirituality of Imperfection and will complete list of pleasurable activities list and read about emotional filtering and judgments. .   Type of psychotherapeutic technique provided:   Insight oriented, Client centered and Solution-focused    Progress toward short term goals: attended  session, attending freedom    Review of long term goals: to improve health and avoid enabling.  .    Diagnosis:   1. Adjustment disorder with anxious mood    Improving     Plan and Follow-up:   Patient will follow safety plan of seeking help from friend/family, calling [facility], calling 911, and/or presenting to the ED if necessary.  Patient will be compliant with medications and attend appointments as scheduled.  Follow recommendations of medical providers  Complete Pleasurable activities log  Practice Acceptance of self and others  Review Spirituality of Imperfection book    Discharge Criteria/Planning: Patient will continue with follow-up until therapy can be discontinued without return of signs and symptoms.    Signatures:   Performed and documented by Wali Carrion, TABBY, LICSW, LADC          This note was created with help of Dragon dictation software. Grammatical / typing errors are not intentional and inherent to the software.

## 2021-06-17 NOTE — PROGRESS NOTES
Pt discharged on this date because provider is retiring. Pt reports not wanting to work with another therapy provider at this time. Pt reports stable and no longer needing psychotherapy at this time. Pt provided with behavioral access psychotherapy scheduling telephone number: [phone] and will call if desiring psychotherapy in the future.

## 2021-06-17 NOTE — PROGRESS NOTES
Mental Health Visit Note    4/23/2018   Start time: 10:00    Stop Time: 10:50   Session # 16    Ora Og is a 77 y.o. female is being seen today for a follow-up psychotherapy appointment    Chief Complaint   Patient presents with      Follow Up    .     New symptoms or complaints:  Pt reports loneliness as  has become more ill    Functional Impairment:   The patient identifies the how daily stressors affect daily functioning in today's session as follows (Scale is 1-4, 1=not at all or rarely; 4=extremely so/everyday.)    Personal: 2  Family: 2  Social: 2  Work: retired    Clinical assessment of mental status:   Ora Og presented on time.   She was oriented x3, open and cooperative, and dressed appropriately for this session and weather. Her memory was Normal cognitive functioning .  Her speech was  Within normal.  Language was talkative, spontaneous, normal.  Concentration and focus is Within normal. Psychosis is not noted or reported. She reports her mood is Congruent w/content of speech.  Affect is congruent with speech and is Congruent w/content of speech.  Fund of knowledge is adequate. Insight is adequate for therapy.      Suicidal/Homicidal Ideation present:   No,  Patient denies suicidal and homicidal ideations/means or plans.      Patient's impression of their current status:  Pt is making progress. She reported concern over son D who had surgery and is slow healing and son J who owns sandwich shop and is not doing well with it. She enables son J who does not work hard at building business. Her  is more frail and depressed and pt working on getting out to do things for self. She completed pleasureable activity list and found many activities that gave her more pleasure than she'd anticipated. She is involved with many community activities. And is working on what she wants from relationships vs what she thinks others want.    Therapist impression of patient's current state:   Ora  LENCHO Og presents with some anxiety and turning over caretaking duties toward family. She cont to seek acceptance from others by bending to their will or neglecting own needs and feelings. She sees this tend less as neglect than a virtue, caring for others at her own expence. Having trouble noting how that creates conflicts both in self and others. Exploring ways of developing better sense of self and of self acceptance. She read Spirituality of Imperfection and will complete list of pleasurable activities list and read about emotional filtering and judgments. Practiced loving kindness meditation and pt reported very helpful.   Type of psychotherapeutic technique provided:   Insight oriented, Client centered and Solution-focused    Progress toward short term goals: attended  session, reina loja    Review of long term goals: to improve health and avoid enabling.  .    Diagnosis:   1. Adjustment disorder with anxious mood    Improving     Plan and Follow-up:   Patient will follow safety plan of seeking help from friend/family, calling [facility] crisis, calling 911, and/or presenting to the ED if necessary.  Patient will be compliant with medications and attend appointments as scheduled.  Follow recommendations of medical providers  Complete Pleasurable activities log  Practice Acceptance of self and others  Review Spirituality of Imperfection book    Discharge Criteria/Planning: Patient will continue with follow-up until therapy can be discontinued without return of signs and symptoms.    Signatures:   Performed and documented by TABBY Mancini, LICSW, LADC          This note was created with help of Dragon dictation software. Grammatical / typing errors are not intentional and inherent to the software.

## 2021-06-17 NOTE — PROGRESS NOTES
Mental Health Visit Note    11/17/2017    Start time: 10:00    Stop Time: 10:50   Session # 15    Ora Og is a 77 y.o. female is being seen today for a follow-up psychotherapy appointment    Chief Complaint   Patient presents with      Follow Up     Anxiety     Depression    .     New symptoms or complaints:  Pt reports trouble knowing what she wants from others, overly sensitive to projections, feels lacks acceptance in life from others and from self    Functional Impairment:   The patient identifies the how daily stressors affect daily functioning in today's session as follows (Scale is 1-4, 1=not at all or rarely; 4=extremely so/everyday.)    Personal: 2  Family: 2  Social: 2  Work: retired    Clinical assessment of mental status:   Ora Og presented on time.   She was oriented x3, open and cooperative, and dressed appropriately for this session and weather. Her memory was Normal cognitive functioning .  Her speech was  Within normal.  Language was talkative, spontaneous, normal.  Concentration and focus is Within normal. Psychosis is not noted or reported. She reports her mood is Congruent w/content of speech.  Affect is congruent with speech and is Congruent w/content of speech.  Fund of knowledge is adequate. Insight is adequate for therapy.      Suicidal/Homicidal Ideation present:   No,  Patient denies suicidal and homicidal ideations/means or plans.      Patient's impression of their current status:  Pt is making progress. She reported wanting acceptance from son and from others.  She has hard time accepting self. She cont to be busy shaping self to meet her projections of what others need. This creates conflicts with others and leads her to feel resentful, hurt and empty. She reported never crying despite hurt, she reported unsure of how to accept limits of her abiltiy to control an dhelp others. She reported feeling disappointed in self from paying son's phone bill (enabling) and is  connecting enabling behavior to need for acceptance. Discussed what she wants from relationships vs what she thinks others want.    Therapist impression of patient's current state:   Ora Og presents with some anxiety and turning over caretaking duties toward family. She cont to seek acceptance from others by bending to their will or neglecting own needs and feelings. She sees this tend less as neglect than a virtue, caring for others at her own expence. Having trouble noting how that creates conflicts both in self and others. Exploring ways of developing better sense of self and of self acceptance. She read Spirituality of Imperfection and will complete list of pleasurable activities list and read about emotional filtering and judgments.   Type of psychotherapeutic technique provided:   Insight oriented, Client centered and Solution-focused    Progress toward short term goals: attended  session, attending freedom    Review of long term goals: to improve health and avoid enabling.  .    Diagnosis:   1. Adjustment disorder with anxious mood    Improving     Plan and Follow-up:   Patient will follow safety plan of seeking help from friend/family, calling [facility], calling 911, and/or presenting to the ED if necessary.  Patient will be compliant with medications and attend appointments as scheduled.  Follow recommendations of medical providers  Complete Pleasurable activities log  Read about judgments  Practice Acceptance of self and others  Review Spirituality of Imperfection book    Discharge Criteria/Planning: Patient will continue with follow-up until therapy can be discontinued without return of signs and symptoms.    Signatures:   Performed and documented by Wali Carrion, TABBY, LICSW, [facility]          This note was created with help of Dragon dictation software. Grammatical / typing errors are not intentional and inherent to the software.

## 2021-06-18 NOTE — PROGRESS NOTES
Mental Health Visit Note    6/15/2018     Start time: 9:00    Stop Time: 9:50   Session # 19    Ora Og is a 77 y.o. female is being seen today for a follow-up psychotherapy appointment    Chief Complaint   Patient presents with      Follow Up     Depression     Anxiety    .     New symptoms or complaints:  Pt reports cont to feel stress over behaviors of daughter and     Functional Impairment:   The patient identifies the how daily stressors affect daily functioning in today's session as follows (Scale is 1-4, 1=not at all or rarely; 4=extremely so/everyday.)    Personal: 2  Family: 2  Social: 2  Work: retired    Clinical assessment of mental status:   Ora Og presented on time.   She was oriented x3, open and cooperative, and dressed appropriately for this session and weather. Her memory was Normal cognitive functioning .  Her speech was  Within normal.  Language was talkative, spontaneous, normal.  Concentration and focus is Within normal. Psychosis is not noted or reported. She reports her mood is Congruent w/content of speech.  Affect is congruent with speech and is Congruent w/content of speech.  Fund of knowledge is adequate. Insight is adequate for therapy.      Suicidal/Homicidal Ideation present:   No,  Patient denies suicidal and homicidal ideations/means or plans.      Patient's impression of their current status:  Pt is making progress in taking some time for self and reports overwhelming sadness when not focused on rescuing and caring for others. cont to work on setting boundaries and letting go of control over things she cant control. Less enmeshed in others lives and problems. Will work on figuring out ways of coping with that sadness and filling void felt when not focusing on what others think, feel and need. Reports whenever with people she worries about whether theyre having good time so she has trouble enjoying the event herself. Completed dissecting the problem and  reported overwhelming sadness the problem. Completed life compass assignment. Reviewed first and will do second next session    Therapist impression of patient's current state:   Ora Og presents with some anxiety and is working on turning over caretaking duties toward friends and family. She feels sad in setting boundaries though she reports it's not in effort to get people to like her but is rather due to feeling that she must help others as part of feeling worthy. Explored  current behavior and ways of caring more for self and import of expressing feelings including anger as way of being honest with others. Cont working on dissecting problem.     Type of psychotherapeutic technique provided:   Insight oriented, Client centered and Solution-focused    Progress toward short term goals: attended  session, attending freedom    Review of long term goals: to improve health and avoid enabling.  .    Diagnosis:   1. Adjustment disorder with anxious mood    Improving     Plan and Follow-up:   Patient will follow safety plan of seeking help from friend/family, calling [facility] crisis, calling 911, and/or presenting to the ED if necessary.  Patient will be compliant with medications and attend appointments as scheduled.  Follow recommendations of medical providers  Cont Pleasurable activities log  Practice Acceptance of self and others  Review Dissecting the Problem and Life Compass worksheets    Discharge Criteria/Planning: Patient will continue with follow-up until therapy can be discontinued without return of signs and symptoms.    Signatures:   Performed and documented by Wali Carrion, TABBY, LICSW, LADC          This note was created with help of Dragon dictation software. Grammatical / typing errors are not intentional and inherent to the software.

## 2021-06-18 NOTE — PROGRESS NOTES
Mental Health Visit Note    6/1/2018   Start time: 11:00    Stop Time: 11:50   Session # 18    Ora Og is a 77 y.o. female is being seen today for a follow-up psychotherapy appointment    Chief Complaint   Patient presents with      Follow Up     Anxiety    .     New symptoms or complaints:  Pt reports loneliness as  has become more ill    Functional Impairment:   The patient identifies the how daily stressors affect daily functioning in today's session as follows (Scale is 1-4, 1=not at all or rarely; 4=extremely so/everyday.)    Personal: 2  Family: 2  Social: 2  Work: retired    Clinical assessment of mental status:   Ora Og presented on time.   She was oriented x3, open and cooperative, and dressed appropriately for this session and weather. Her memory was Normal cognitive functioning .  Her speech was  Within normal.  Language was talkative, spontaneous, normal.  Concentration and focus is Within normal. Psychosis is not noted or reported. She reports her mood is Congruent w/content of speech.  Affect is congruent with speech and is Congruent w/content of speech.  Fund of knowledge is adequate. Insight is adequate for therapy.      Suicidal/Homicidal Ideation present:   No,  Patient denies suicidal and homicidal ideations/means or plans.      Patient's impression of their current status:  Pt is making progress in taking some time for self but cont to struggle with being assertive and setting boundaries and letting go of control over things she cant control. Enmeshed in others lives and problems. 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 Blames self rather than his lack of effort. Getting drawn into caring for neighbor and fearful of saying no to her. Concerned about daughter Jade who is acting out at home. Reviewed areas for growth and ways of being assertive. Practiced communication skills. Dissected problems and  will do so again next session. Pt overwhelmed and feels guilty and unwanted when not helping others.     Therapist impression of patient's current state:   Ora Og presents with some anxiety and is having trouble turning over caretaking duties toward friends and family. She feels stuck in setting boundaries though she reports it's not in effort to get people to like her but is rather due to feeling that she must help others as part of feeling worthy. Explored pros and cons of current behavior and ways of caring more for self and import of expressing feelings including anger as way of being honest with others. Cont working on dissecting problem.     Type of psychotherapeutic technique provided:   Insight oriented, Client centered and Solution-focused    Progress toward short term goals: attended  session, attending freedom    Review of long term goals: to improve health and avoid enabling.  .    Diagnosis:   1. Adjustment disorder with anxious mood    Improving     Plan and Follow-up:   Patient will follow safety plan of seeking help from friend/family, calling [facility], calling 911, and/or presenting to the ED if necessary.  Patient will be compliant with medications and attend appointments as scheduled.  Follow recommendations of medical providers  Cont Pleasurable activities log  Practice Acceptance of self and others  Review Dissecting the Problem and Life Compass worksheets    Discharge Criteria/Planning: Patient will continue with follow-up until therapy can be discontinued without return of signs and symptoms.    Signatures:   Performed and documented by Wali Carrion, TABBY, LICSW, LADC          This note was created with help of Dragon dictation software. Grammatical / typing errors are not intentional and inherent to the software.

## 2021-06-19 NOTE — PROGRESS NOTES
[facility]    Assessment/Plan:    1. Allergic rhinitis and eustachian tube dysfunction  Patient is already on Flonase.  She is unable to tolerate oral antihistamines due to dryness.  Will try intranasal antihistamine.  Discussed that potentially can dry out too much and cause irritation.  Patient can try nasal saline in between as well to help moisturize mucosa.  If she is not better we will refer her to ENT.  - azelastine (ASTELIN) 137 mcg (0.1 %) nasal spray; One spray  in each nostril twice a day  Dispense: 30 mL; Refill: 12    2. Excessive ear wax, right  Ear was flushed out.  Per nursing report it was only a very small film of wax over her ear.    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    Jessica Diaz MD    Chief Complaint:  Chief Complaint   Patient presents with     Ear Pain     (R) ear - off and on since weekend       History of Present Illness:  Ora is a 77 y.o. female with history of mild kidney insufficiency, previous cecal volvulus requiring ileostomy, postoperative DVT and PE, cholecystectomy, history of rheumatic fever and seasonal allergies, past operative recurrent anemia.  She is currently here for acute visit due to feeling of congestion in her ears.    Patient reports feeling off congested ears and plugged sensation on and off since weekend.  She does have seasonal allergies and uses Flonase regularly.  She denies any nasal congestion but has had more postnasal drainage and has to clear her throat more.  She denies any fevers chills or pain in her sinuses.  On exam there was slight fluid behind her right tympanic membrane.  Right ear was obstructed by wax.  That was flushed out in per my nurse reports there was only a small amount of wax there.  We did discuss addition of Astelin nasal spray to help regulate eustachian tube pressure and feeling of congestion.  If symptoms do not improve patient will let me  "know and we will send her to ENT.  In the past oral antihistamines caused too much diffuse dryness.    Review of Systems:  A comprehensive review of systems was performed and was otherwise negative    PFSH:  Social History: Reviewed  History   Smoking Status     Never Smoker   Smokeless Tobacco     Never Used     Social History     Social History Narrative    Patient is .  She has 15 children who are all grown up.    She enjoys walking daily for exercise.        Past History: Reviewed  Current Outpatient Prescriptions   Medication Sig Dispense Refill     amLODIPine (NORVASC) 5 MG tablet Take 1 tablet (5 mg total) by mouth daily. 90 tablet 3     aspirin 81 MG EC tablet Take 81 mg by mouth daily.       azelastine (ASTELIN) 137 mcg (0.1 %) nasal spray One spray  in each nostril twice a day 30 mL 12     calcium carb &cit-vit D3 (CITRACAL + D SLOW RELEASE) 600 mg calcium- 500 unit TbER Take 1,200 mg by mouth daily.        fluticasone (FLONASE) 50 mcg/actuation nasal spray SPRAY 2 SPRAYS INTO EACH NOSTRILS DAILY 16 g 6     PROPYLENE GLYCOL//PF (SYSTANE, PF, OPHT) Apply 2 drops to eye as needed. Both eyes        therapeutic multivitamin (THERAGRAN) tablet Take 1 tablet by mouth daily.       UNABLE TO FIND Med Name:Fungi-nail liquid- Apply twice a day       UNABLE TO FIND Med Name:Formula 3, apply twice a day       No current facility-administered medications for this visit.        Family History: Reviewed    Physical Exam:    Vitals:    07/25/18 1050   BP: 134/76   Patient Site: Left Arm   Patient Position: Sitting   Cuff Size: Adult Regular   Pulse: 88   Resp: 16   SpO2: 99%   Weight: 118 lb (53.5 kg)   Height: 5' 2\" (1.575 m)     Wt Readings from Last 3 Encounters:   07/25/18 118 lb (53.5 kg)   03/06/18 114 lb 9 oz (52 kg)   11/27/17 115 lb 2 oz (52.2 kg)     Body mass index is 21.58 kg/(m^2).    Constitutional:  Reveals a pleasant female.  Vitals:  Per nursing notes.  HEENT:No cervical LAD, no thyromegaly, "  conjunctiva is pink, no scleral icterus, TMs are visualized on the left and there is slight fluid behind it, no evidence of infection, right tympanic membrane was blocked by wax, right ear was flushed out, oropharynx is clear, no exudates, sinus tenderness to palpation.  Cardiac:  Regular rate and rhythm,no murmurs, rubs, or gallops. Legs without edema. Palpation of the radial pulse regular.  Lungs: Clear to auscultation bl.  Respiratory effort normal.  Psychiatric: affect appropriate, memory intact.       Data Review:    Analysis and Summary of Old Records (2): Yes    Records Requested (1): No    Other History Summarized (from other people in the room) (2) no    Radiology Tests Summarized (XRAY/CT/MRI/DXA) (1): No    Labs Reviewed (1): No    Medicine Tests Reviewed (EKG/ECHO/COLONOSCOPY/EGD) (1): No    Independent Review of EKG or X-RAY (2): No

## 2021-06-19 NOTE — PROGRESS NOTES
"Outpatient Mental Health Treatment Plan      Name:  Ora Og  :  1940  MRN:  [id number]      Treatment Plan:  Updated Treatment Plan  Intake/initial treatment plan date:  2017      Benefit and risks and alternatives have been discussed: Yes  Is this treatment appropriate with minimal intrusion/restrictions: Yes  Estimated duration of treatment:  Trial of 30 sessions, to be reviewed.  Anticipated frequency of services:  Every 3 weeks  Necessity for frequency: This frequency is needed to maintain progress with therapeutic goals and for continuity of care in order to monitor progress.  Necessity for treatment: To address cognitive, behavioral, and/or emotional barriers in order to work toward goals and to improve quality of life.          Plan:                                                                                                                                           ?                        ? Anxiety                                            Goal:              Decrease average anxiety level from 5 to 3.                        Strategies:                 ? [x]Continue to practice relaxation techniques and other coping                                                                       strategies (e.g., thought stopping, reframing, meditation)                                                                    ? [x] Increase involvement in activities that bring you pleasure                                                                    ? [x] Practice healthy boundaries by saying \"no\" to requests you don't feel comfortable taking on                                                                    ? [x] Continue to explore thoughts and expectations about self and others                                                                    ? [x] Identify and monitor triggers for panic/anxiety symptoms                                                                    ? [] Implement " physical activity routine (with physician approval)                                                                    ? [x] Read daily meditation from Alanon and review with therapist                                                                    ? [x] Continue compliance with medical treatment plan (or explore                                                                    barriers)                              Degree to which this is a problem (1-4): 2   Degree to which goal is met (1-4) 2  Date of Review: 7/13/18     Adjustment to alcoholism of son                                      Goal:              Increase % acceptance from 95 to 100.                        Strategies:                 ?[x]  Explore thoughts and expectations about self and others                                                         [x] Cont to explore emotional reactions to alcohol-related problems                                                                    ?[x] Learn and practice relaxation techniques and other coping                                                                       strategies                                                                    [x] Implement physical activity routine (with physician approval)                                                          [x] Continue involvement in Alanon by attending weekly meetings                                                          [x] Engage in values clarification and goal-setting with self and therapist by completing assignments related to values and goals                                                                    [x] Engage in Alanon discussions and readings to develop supports and insights          Date of Review: 7/13/18    Functional Impairment: 1=Not at all/Rarely  2=Some days  3=Most Days  4=Every Day   Personal: 2  Family: 2  Social: 2  Work: 0      Diagnosis:   Adjustment Disorder with mixed anxiety and depressed mood, chronic kidney  disease, hypertension, GERD     WHODAS 2.0 12-item version= 10  H1= 10  H2= 0  H3= 2  Clinical assessments completed: BREN-7, PHQ-9, Mood Questionnaire current, CAGE-AID, PANSI.          Strengths:  Intelligent, caring,healthy, active, good listener, strong desire to find solutions, loving , involved with social network  Limitations:  Severe anxiety, depression, limited ability to change others  Cultural Considerations:  Ora  is a   female with self determination to feel better. She  uses Western medicine and has a strong Hinduism  [place] . She  has health insurance is able to navigate the system.          Persons responsible for this plan:  ? [x] Patient                     ? [] Provider                   ? [] Other: __________________          Provider:Performed and documented by MARGARITA Whitley; [facility]2018        Date:  7/28/2018    Time:  9:14 PM              Patient Signature:____________________________________ Date: ______________          Therapist Signature: __________________________________ Date: ______________

## 2021-06-19 NOTE — PROGRESS NOTES
Mental Health Visit Note    6/29/2018     Start time: 9:00    Stop Time: 9:50   Session # 20    Ora Og is a 77 y.o. female is being seen today for a follow-up psychotherapy appointment    Chief Complaint   Patient presents with      Follow Up     Anxiety     Depression    .     New symptoms or complaints:  Pt reports stress over problems son,  and daughter are having    Functional Impairment:   The patient identifies the how daily stressors affect daily functioning in today's session as follows (Scale is 1-4, 1=not at all or rarely; 4=extremely so/everyday.)    Personal: 2  Family: 2  Social: 2  Work: retired    Clinical assessment of mental status:   Ora Og presented on time.  No changes since last session 6/15/18.  She was oriented x3, open and cooperative, and dressed appropriately for this session and weather. Her memory was Normal cognitive functioning .  Her speech was  Within normal.  Language was talkative, spontaneous, normal.  Concentration and focus is Within normal. Psychosis is not noted or reported. She reports her mood is Congruent w/content of speech.  Affect is congruent with speech and is Congruent w/content of speech.  Fund of knowledge is adequate. Insight is adequate for therapy.      Suicidal/Homicidal Ideation present:   No,  Patient denies suicidal and homicidal ideations/means or plans.      Patient's impression of their current status:  Pt completed PHQ-9 and BREN 7 screens indicating no depression (3) and cont anxiety (12) . Processed events leading to anxiety including relapse behaviors of son D, some acting out by daughter and lack of effort by son J and 's depression. Pt cont  making progress in taking some time for self and reports overwhelming sadness when not focused on rescuing and caring for others. cont to work on setting boundaries and letting go of control over things she cant control. Less enmeshed in others lives and problems.     Therapist  impression of patient's current state:   Ora Og presents with some anxiety and is working on turning over caretaking duties toward friends and family despite their continued problems. She is more aware of limits of control. She feels sad in setting boundaries though she reports it's not in effort to get people to like her but is rather due to feeling that she must help others as part of feeling worthy. Explored  current behavior and ways of caring more for self and import of expressing feelings including anger as way of being honest with others. REviewed and will Cont working on dissecting problem to process difficulties she has letting go and sadness she feels over others problems as way of letting go of some self blame. .     Type of psychotherapeutic technique provided:   Insight oriented, Client centered and Solution-focused    Progress toward short term goals: attended  session, reina loja    Review of long term goals: to improve health and avoid enabling.  .    Diagnosis:   1. Adjustment disorder with anxious mood    Improving     Plan and Follow-up:   Patient will follow safety plan of seeking help from friend/family, calling [facility] crisis, calling 911, and/or presenting to the ED if necessary.  Patient will be compliant with medications and attend appointments as scheduled.  Follow recommendations of medical providers  Complete new assignment  Practice Acceptance of self and others  Review  Life Compass worksheets    Discharge Criteria/Planning: Patient will continue with follow-up until therapy can be discontinued without return of signs and symptoms.    Signatures:   Performed and documented by Wali Carrion, TABBY, LICSW, LADC

## 2021-06-19 NOTE — PROGRESS NOTES
Mental Health Visit Note    7/13/18   Start time: 10:00    Stop Time: 10:50   Session # 21    Ora Og is a 77 y.o. female is being seen today for a follow-up psychotherapy appointment    Chief Complaint   Patient presents with     Follow-up     Anxiety    .     New symptoms or complaints:  Pt reports no new problems.     Functional Impairment:   The patient identifies the how daily stressors affect daily functioning in today's session as follows (Scale is 1-4, 1=not at all or rarely; 4=extremely so/everyday.)    Personal: 2  Family: 2  Social: 2  Work: retired    Clinical assessment of mental status:   Ora Og presented on time.  No changes since last session 6/15/18.  She was oriented x3, open and cooperative, and dressed appropriately for this session and weather. Her memory was Normal cognitive functioning .  Her speech was  Within normal.  Language was talkative, spontaneous, normal.  Concentration and focus is Within normal. Psychosis is not noted or reported. She reports her mood is Congruent w/content of speech.  Affect is congruent with speech and is Congruent w/content of speech.  Fund of knowledge is adequate. Insight is adequate for therapy.      Suicidal/Homicidal Ideation present:   No,  Patient denies suicidal and homicidal ideations/means or plans.      Patient's impression of their current status:  Pt reported improvement in anxiety and setting better boundaries and having more fun in life and improved relationship with . Pt reports sending email to son D in assertive language and not getting a response but not letting that worry her. She was addressing his taking money from her CC account to pay phone bill. Pt having good conversations with some family and able to accept that some family remain distant. Looking forward to zulma who lives in [facility] returning for a vacation. Letting go of trying to control others       Therapist impression of patient's current state:   Ora MUSA  "Earle presents with less depression and anxiety and is working on turning over caretaking duties toward friends and family despite their continued problems. Letting go of trying to control others and results show in improved demeanor and assertiveness during session. She cont to say doing better \"I think\" but has a better core confidence that that is so. Pt no longer attending [facility] because she is busy with other gratifying and supportive activities.     Type of psychotherapeutic technique provided:   Insight oriented, Client centered and Solution-focused    Progress toward short term goals: attended  session, setting boundaries, involved with family, not personalizing other peoples problems    Review of long term goals: to improve health and avoid enabling.  .    Diagnosis:   1. Adjustment disorder with anxious mood    2. BREN (generalized anxiety disorder)    Improving     Plan and Follow-up:   Patient will follow safety plan of seeking help from friend/family, calling [facility] crisis, calling 911, and/or presenting to the ED if necessary.  Patient will be compliant with medications and attend appointments as scheduled.  Follow recommendations of medical providers  Cont with Life Compass assignment  Practice Acceptance of self and others  Provide pt with 10 yr from now worksheet    Discharge Criteria/Planning: Patient will continue with follow-up until therapy can be discontinued without return of signs and symptoms.    Signatures:   Performed and documented by Wali Carrion, TABBY, LICSW, LADC          "

## 2021-06-20 NOTE — PROGRESS NOTES
"Mental Health Visit Note    10/05/18  Start time: 10:00    Stop Time: 10:50   Session # 23    Ora Og is a 78 y.o. female is being seen today for a follow-up psychotherapy appointment    Chief Complaint   Patient presents with      Follow Up     Anxiety    .     New symptoms or complaints:  Pt reports some concern as needy sister is moving into apartment in their senior residence.    Functional Impairment:   The patient identifies the how daily stressors affect daily functioning in today's session as follows (Scale is 1-4, 1=not at all or rarely; 4=extremely so/everyday.)    Personal: 2  Family: 2  Social: 2  Work: retired    Clinical assessment of mental status:   Ora Og presented on time.  No changes since last session 9/7/18.  She was oriented x3, open and cooperative, and dressed appropriately for this session and weather. Her memory was Normal cognitive functioning .  Her speech was  Within normal.  Language was talkative, spontaneous, normal.  Concentration and focus is Within normal. Psychosis is not noted or reported. She reports her mood is Congruent w/content of speech.  Affect is congruent with speech and is Congruent w/content of speech.  Fund of knowledge is adequate. Insight is adequate for therapy.      Suicidal/Homicidal Ideation present:   No,  Patient denies suicidal and homicidal ideations/means or plans.      Patient's impression of their current status:  Pt reported improvement in mood and cont setting better boundaries and having more fun in life. She feels \"less guilty\" about putting her needs first. Feels great spiritual connection to God and nature, being less judgmental toward others. Feels less afraid and is spending more time with  at night vs \"escaping into another room\" to improve intimacy. Pt reports good relationships with most children and reading alanon meds each day. She reports concern over 's health but is aware of limits of her control.Pt " practicing more assertive communication and letting go of judgments. Updated and reviewed and agreed to tx plan and pt working successfully at lessening anxiety and improving relationships in family. She reports improved med health due to improved mental health      Therapist impression of patient's current state:   Ora Og presents with less depression and anxiety and cont working on turning over caretaking duties toward friends and family despite their continued problems. Cont letting go of trying to control others and results show in improved demeanor and assertiveness during session. She practiced good self care and is eating, exercising, sleeping well.  Pt no longer attending Alanon but is reading daily meditation. Spirituality is helping her find strength and calmness amidst concerns.     Type of psychotherapeutic technique provided:   Insight oriented, Client centered and Solution-focused    Progress toward short term goals: attended  session, setting boundaries, involved with family, not personalizing other peoples problems    Review of long term goals: to improve health and avoid enabling of others, to continue to focus on pursuing meaningful activities, improve conscious contact with higher power, put self first without guilt. Maintain effective boundaries. Date of tx plan review: 10/5/2018  Date of next tx plan review: 1/5/19.  .    Diagnosis:   1. Adjustment disorder with anxious mood    2. BREN (generalized anxiety disorder)    Improving     Plan and Follow-up:   Patient will follow safety plan of seeking help from friend/family, calling [facility] crisis, calling 911, and/or presenting to the ED if necessary.  Patient will be compliant with medications and attend appointments as scheduled.  Follow recommendations of medical providers  Cont with Life Compass assignment  Provide pt with 10 yr from now worksheet    Discharge Criteria/Planning: Patient will continue with follow-up until therapy can be  discontinued without return of signs and symptoms.    Signatures:   Performed and documented by Wali Carrion, TABBY, LICSW, LADC

## 2021-06-20 NOTE — PROGRESS NOTES
Mental Health Visit Note    7/13/18   Start time: 10:00    Stop Time: 10:50   Session # 22    Ora Og is a 77 y.o. female is being seen today for a follow-up psychotherapy appointment    Chief Complaint   Patient presents with     MH Follow Up     Anxiety    .     New symptoms or complaints:  Pt reports some concern as needy sister is moving into apartment in their senior residence.    Functional Impairment:   The patient identifies the how daily stressors affect daily functioning in today's session as follows (Scale is 1-4, 1=not at all or rarely; 4=extremely so/everyday.)    Personal: 2  Family: 2  Social: 2  Work: retired    Clinical assessment of mental status:   Ora Og presented on time.  No changes since last session 7/13/18.  She was oriented x3, open and cooperative, and dressed appropriately for this session and weather. Her memory was Normal cognitive functioning .  Her speech was  Within normal.  Language was talkative, spontaneous, normal.  Concentration and focus is Within normal. Psychosis is not noted or reported. She reports her mood is Congruent w/content of speech.  Affect is congruent with speech and is Congruent w/content of speech.  Fund of knowledge is adequate. Insight is adequate for therapy.      Suicidal/Homicidal Ideation present:   No,  Patient denies suicidal and homicidal ideations/means or plans.      Patient's impression of their current status:  Pt reported improvement in mood and cont setting better boundaries and having more fun in life. Pt reports good relationships with most children and reading alanon meds each day. She reports concern over 's health but is aware of limits of her control. 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 Sister is moving into their senior residence. Enjoyed daughter's visit last month from [facility]. Visit was enjoyable. Pt practicing  more assertive communication and letting go of judgments. Did not discuss high blood pressure, will do at next session. Explored life compass and pt more satisfied with relationships. Letting go of trying to control others       Therapist impression of patient's current state:   Ora Og presents with less depression and anxiety and cont working on turning over caretaking duties toward friends and family despite their continued problems. Letting go of trying to control others and results show in improved demeanor and assertiveness during session. She practiced assertive communication re setting boundaries with younger sister. Pt no longer attending [employer] but is reading daily meditation. Spirituality is helping her find strength and calmness amidst concerns.     Type of psychotherapeutic technique provided:   Insight oriented, Client centered and Solution-focused    Progress toward short term goals: attended  session, setting boundaries, involved with family, not personalizing other peoples problems    Review of long term goals: to improve health and avoid enabling.  .    Diagnosis:   1. Adjustment disorder with anxious mood    2. BREN (generalized anxiety disorder)    Improving     Plan and Follow-up:   Patient will follow safety plan of seeking help from friend/family, calling [facility], calling 911, and/or presenting to the ED if necessary.  Patient will be compliant with medications and attend appointments as scheduled.  Follow recommendations of medical providers  Cont with Life Compass assignment  Provide pt with 10 yr from now worksheet    Discharge Criteria/Planning: Patient will continue with follow-up until therapy can be discontinued without return of signs and symptoms.    Signatures:   Performed and documented by Wali Carrion, TABBY, LICSW, LADC

## 2021-06-20 NOTE — PROGRESS NOTES
"Outpatient Mental Health Treatment Plan      Name:  Ora Og  :  1940  MRN:  [id number]      Treatment Plan:  Updated Treatment Plan  Intake/initial treatment plan date:  2017      Benefit and risks and alternatives have been discussed: Yes  Is this treatment appropriate with minimal intrusion/restrictions: Yes  Estimated duration of treatment:  Trial of 30 sessions, to be reviewed.  Anticipated frequency of services:  Every 3 weeks  Necessity for frequency: This frequency is needed to maintain progress with therapeutic goals and for continuity of care in order to monitor progress.  Necessity for treatment: To address cognitive, behavioral, and/or emotional barriers in order to work toward goals and to improve quality of life.          Plan:                                                                                                                                           ?                        ? Anxiety                                            Goal:              Decrease average anxiety level from 3 to 2.                        Strategies:                 ? [x]Continue to practice relaxation techniques and other coping                                                                       strategies (e.g., thought stopping, reframing, meditation)                                                                    ? [x] Increase involvement in activities that bring you pleasure                                                                    ? [x] Practice healthy boundaries by saying \"no\" to requests you don't feel comfortable taking on                                                                    ? [x] Continue to explore thoughts and expectations about self and others                                                                    ? [x] Identify and monitor triggers for panic/anxiety symptoms                                                                    ? [] Implement " physical activity routine (with physician approval)                                                                    ? [x] Practice meditation and prayer on daily basis to increase connection with caring higher power                                                                    ? [x] Continue compliance with medical treatment plan (or explore                                                                    barriers)                              Degree to which this is a problem (1-4): 2   Degree to which goal is met (1-4) 2  Date of Review: 10/5/2018        Adjustment to change in family roles                                     Goal:              Increase % acceptance from 85 to 100.                        Strategies:                 ?[x]  Explore thoughts and expectations about self and others                                                         [x] Cont to explore emotional reactions to alcohol-related problems                                                                    ?[x] Learn and practice relaxation techniques and other coping                                                                       strategies                                                                    [x] Implement physical activity routine (with physician approval)                                                          [x] Continue involvement in community activities of your choosing                                                          [x] Engage in values clarification and goal-setting with self and therapist by completing assignments related to values and goals                                                                    [x] Make efforts to feel confident putting your needs first when appropriate          Date of Review: 10/5/2018    Functional Impairment: 1=Not at all/Rarely  2=Some days  3=Most Days  4=Every Day   Personal: 2  Family: 2  Social: 2  Work: 0      Diagnosis:   Adjustment Disorder with  mixed anxiety and depressed mood, chronic kidney disease, hypertension, GERD      WHODAS 2.0 12-item version= 10  H1= 5  H2= 0  H3= 0  Clinical assessments completed: BREN-7, PHQ-9, Mood Questionnaire current, CAGE-AID, PANSI.          Strengths:  Intelligent, caring,healthy, active, good listener, strong desire to find solutions, loving , involved with social network  Limitations:  Severe anxiety, depression, limited ability to change others  Cultural Considerations:  Ora  is a   female with self determination to feel better. She  uses Western medicine and has a strong Sabianist  [place] . She  has health insurance is able to navigate the system.          Persons responsible for this plan:  ? [x] Patient                     ? [x] Provider                   ? [] Other: __________________          Provider:Performed and documented by MARGARITA WhitleySW; [facility] 10/5/2018           Date:  10/5/2018       Time:  10:00AM              Patient Signature:____________________________________ Date: ______________           Therapist Signature: __________________________________ Date: ______________

## 2021-06-20 NOTE — PROGRESS NOTES
[facility] Follow [facility]    Assessment/Plan:    1. Essential hypertension  Unclear why this would get worse.  Her weight is stable.  Currently we discussed that we will complete her dose of amlodipine and she will take 2.5 mg twice a day.  I would like her to check her blood pressure in the morning and in the evening and she will send me a message in a couple of days with reports.  She will stop by and have repeat blood pressure check by our nurse next week and will compare it to her machine to make sure it is not malfunctioning.  If she continues to have elevated blood pressure most likely I would add losartan to her regimen.  - amLODIPine (NORVASC) 2.5 MG tablet; Take 1 tablet (2.5 mg total) by mouth daily.  Dispense: 60 tablet; Refill: 11    2. Flu vaccine need  - Influenza High Dose, Seasonal    Jessica Diaz MD    Chief Complaint:  Chief Complaint   Patient presents with     Hypertension     Flu Vaccine       History of Present Illness:  Ora is a 77 y.o. female with history of mild kidney insufficiency, previous cecal volvulus requiring ileostomy, postoperative DVT and PE, cholecystectomy, history of rheumatic fever and seasonal allergies, past operative recurrent anemia.  She is currently here for acute visit due to worsening blood pressure at home.    Patient has been monitoring blood pressure at home regularly and it has been in the right normal range.  She takes amlodipine 5 mg in the morning.  Most recently out of the blue blood pressure became elevated and has stayed so with readings in 150s-160s systolically.  Heart rate is 82.  Patient does not know what triggered it, she denies excessive salt or alcohol intake, she is not anxious or having stress.  She also developed mild subconjunctival hematoma in her left eye which she has not noticed until today.  She does have mild seasonal allergies and most likely was rubbing her eye.  She denies any snoring.  Blood pressure in the office  "today is 122/82.    Review of Systems:  A comprehensive review of systems was performed and was otherwise negative    PFSH:  Social History: Reviewed  History   Smoking Status     Never Smoker   Smokeless Tobacco     Never Used     Social History     Social History Narrative    Patient is .  She has 15 children who are all grown up.    She enjoys walking daily for exercise.        Past History: Reviewed  Current Outpatient Prescriptions   Medication Sig Dispense Refill     aspirin 81 MG EC tablet Take 81 mg by mouth daily.       azelastine (ASTELIN) 137 mcg (0.1 %) nasal spray One spray  in each nostril twice a day 30 mL 12     calcium carb &cit-vit D3 (CITRACAL + D SLOW RELEASE) 600 mg calcium- 500 unit TbER Take 1,200 mg by mouth daily.        fluticasone (FLONASE) 50 mcg/actuation nasal spray SPRAY 2 SPRAYS INTO EACH NOSTRILS DAILY 16 g 6     PROPYLENE GLYCOL//PF (SYSTANE, PF, OPHT) Apply 2 drops to eye as needed. Both eyes        therapeutic multivitamin (THERAGRAN) tablet Take 1 tablet by mouth daily.       UNABLE TO FIND Med Name:Formula 3, apply twice a day       amLODIPine (NORVASC) 2.5 MG tablet Take 1 tablet (2.5 mg total) by mouth daily. 60 tablet 11     UNABLE TO FIND Med Name:Fungi-nail liquid- Apply twice a day       No current facility-administered medications for this visit.        Family History: Reviewed, sister had breast cancer at the age of 45    Physical Exam:    Vitals:    09/06/18 1202 09/06/18 1205   BP: 128/90 122/82   Pulse: 82    Weight: 117 lb (53.1 kg)    Height: 5' 2\" (1.575 m)      Wt Readings from Last 3 Encounters:   09/06/18 117 lb (53.1 kg)   07/25/18 118 lb (53.5 kg)   03/06/18 114 lb 9 oz (52 kg)     Body mass index is 21.4 kg/(m^2).    Constitutional:  Reveals a pleasant female.  Vitals:  Per nursing notes.  HEENT:No cervical LAD, no thyromegaly,  conjunctiva is pink, no scleral icterus, TMs are visualized and normal bl, oropharynx is clear, no exudates, "   Cardiac:  Regular rate and rhythm,no murmurs, rubs, or gallops.Legs without edema. Palpation of the radial pulse regular.  Lungs: Clear to auscultation bl.  Respiratory effort normal.  Abdomen:positive BS, soft, nontender, nondistended.  No hepato-splenomagaly  Skin:   Without rash, bruise, or palpable lesions.  Rheumatologic: Normal joints and nails of the hands.  Neurologic:  Cranial nerves II-XII intact.     Psychiatric: affect appropriate, memory intact.  Mild anxiety noted.      Data Review:    Analysis and Summary of Old Records (2): Yes    Records Requested (1): No    Other History Summarized (from other people in the room) (2): No    Radiology Tests Summarized (XRAY/CT/MRI/DXA) (1): No    Labs Reviewed (1): No    Medicine Tests Reviewed (EKG/ECHO/COLONOSCOPY/EGD) (1): No    Independent Review of EKG or X-RAY (2): No

## 2021-06-20 NOTE — PROGRESS NOTES
Called to inform pt of need to cancel appt due to training I must attend. Pt amenable to cancellation and scheduled 2 future meetings.

## 2021-06-21 NOTE — PROGRESS NOTES
Mental Health Visit Note    18   Start time: 10:00    Stop Time: 10:50   Session # 25    Ora Og is a 78 y.o. female is being seen today for a follow-up psychotherapy appointment    Chief Complaint   Patient presents with      Follow Up     some trouble setting boundaries with [facility]     Depression     Anxiety    .     New symptoms or complaints:  Reports stress as son's sandwich shop is closing and he has no transition plans    Functional Impairment:   The patient identifies the how daily stressors affect daily functioning in today's session as follows (Scale is 1-4, 1=not at all or rarely; 4=extremely so/everyday.)    Personal: 2  Family: 2  Social: 2  Work: retired    Clinical assessment of mental status:   Ora Og presented on time.  No changes since last session 10/12/18.  She was oriented x3, open and cooperative, and dressed appropriately for this session and weather. Her memory was Normal cognitive functioning .  Her speech was  Within normal.  Language was talkative, spontaneous, normal.  Concentration and focus is Within normal. Psychosis is not noted or reported. She reports her mood is Congruent w/content of speech.  Affect is congruent with speech and is Congruent w/content of speech.  Fund of knowledge is adequate. Insight is adequate for therapy.      Suicidal/Homicidal Ideation present:   No,  Patient denies suicidal and homicidal ideations/means or plans.      Patient's impression of their current status:  Pt reported cont improved mood and cont setting better boundaries and having more fun in life. She feels good about putting her needs first. Not sure where to set boundaries with daughter in [facility] to whom she sends money for bills. Discussed pros and cons of cont help.  Feels cont spiritual connection to God and nature, being less judgmental toward others. Pt cont concerned about son whom she is no longer supporting by letting lease on sandwich shop . She is  working on letting go of concerns and trusting that he will fend for himself.       Therapist impression of patient's current state:   Ora Og presents with less depression and anxiety and cont working on turning over caretaking duties toward friends and family despite their continued problems. Cont letting go of trying to control others and results show in improved demeanor and assertiveness during session. She practicing good self care and is eating, exercising, sleeping well.  Pt attending [employer] and is reading daily meditation. Spirituality is helping her find strength and calmness amidst concerns. Reviewed progress setting boundaries and in self care. Used MI to discuss assisting others and boundaries to asssist pt gain insight into wants and neeeds    Type of psychotherapeutic technique provided:   Insight oriented, Client centered and Solution-focused    Progress toward short term goals: attended  session, setting boundaries, involved with family, not personalizing other peoples problems    Review of long term goals: to improve health and avoid enabling of others, to continue to focus on pursuing meaningful activities, improve conscious contact with higher power, put self first without guilt. Maintain effective boundaries. Date of tx plan review: 10/5/18. Date of next tx plan review: 1/5/19.  .    Diagnosis:   1. Adjustment disorder with anxious mood    2. BREN (generalized anxiety disorder)    Improving     Plan and Follow-up:   Patient will follow safety plan of seeking help from friend/family, calling [facility] crisis, calling 911, and/or presenting to the ED if necessary.  Patient will be compliant with medications and attend appointments as scheduled.  Follow recommendations of medical providers  Cont with Life Compass assignment  Provide pt with 10 yr from now worksheet    Discharge Criteria/Planning: Patient will continue with follow-up until therapy can be discontinued without return of signs and  symptoms.    Signatures:   Performed and documented by Wali Carrion, TABBY, LICCHANG, KISHANC

## 2021-06-21 NOTE — PROGRESS NOTES
"Mental Health Visit Note    10/12/2018     Start time: 12:00    Stop Time: 12:50   Session # 24    Ora Og is a 78 y.o. female is being seen today for a follow-up psychotherapy appointment    Chief Complaint   Patient presents with      Follow Up    .     New symptoms or complaints:  Pt reports some concern as needy sister is moving into apartment in their senior residence.    Functional Impairment:   The patient identifies the how daily stressors affect daily functioning in today's session as follows (Scale is 1-4, 1=not at all or rarely; 4=extremely so/everyday.)    Personal: 2  Family: 2  Social: 2  Work: retired    Clinical assessment of mental status:   Ora Og presented on time.  No changes since last session 10/05/18.  She was oriented x3, open and cooperative, and dressed appropriately for this session and weather. Her memory was Normal cognitive functioning .  Her speech was  Within normal.  Language was talkative, spontaneous, normal.  Concentration and focus is Within normal. Psychosis is not noted or reported. She reports her mood is Congruent w/content of speech.  Affect is congruent with speech and is Congruent w/content of speech.  Fund of knowledge is adequate. Insight is adequate for therapy.      Suicidal/Homicidal Ideation present:   No,  Patient denies suicidal and homicidal ideations/means or plans.      Patient's impression of their current status:  Pt reported improvement in mood and cont setting better boundaries and having more fun in life. She feels \"less guilty\" about putting her needs first. Feels great spiritual connection to God and nature, being less judgmental toward others. 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 She is working on letting go of concerns and trusting that he will fend for himself.   Therapist impression of patient's current state:   Ora Og presents with less depression and anxiety " and cont working on turning over caretaking duties toward friends and family despite their continued problems. Cont letting go of trying to control others and results show in improved demeanor and assertiveness during session. She practiced good self care and is eating, exercising, sleeping well.  Pt attending [employer] and is reading daily meditation. Spirituality is helping her find strength and calmness amidst concerns. Reviewed spiritual readings for inspiration    Type of psychotherapeutic technique provided:   Insight oriented, Client centered and Solution-focused    Progress toward short term goals: attended  session, setting boundaries, involved with family, not personalizing other peoples problems    Review of long term goals: to improve health and avoid enabling of others, to continue to focus on pursuing meaningful activities, improve conscious contact with higher power, put self first without guilt. Maintain effective boundaries. Date of tx plan review: 10/5/18. Date of next tx plan review: 1/5/19.  .    Diagnosis:   1. Adjustment disorder with anxious mood    2. BREN (generalized anxiety disorder)    Improving     Plan and Follow-up:   Patient will follow safety plan of seeking help from friend/family, calling [facility] crisis, calling 911, and/or presenting to the ED if necessary.  Patient will be compliant with medications and attend appointments as scheduled.  Follow recommendations of medical providers  Cont with Life Compass assignment  Provide pt with 10 yr from now worksheet    Discharge Criteria/Planning: Patient will continue with follow-up until therapy can be discontinued without return of signs and symptoms.    Signatures:   Performed and documented by Wali Carrion, TABBY, LICSW, LADC

## 2021-06-22 NOTE — PROGRESS NOTES
[facility] Follow Up Note    Assessment/Plan:    1. 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  She will continue on omeprazole 20 mg once a day indefinitely.  She is on calcium and vitamin D supplements and will monitor her bone density.  She is also on multivitamin and has additional B12 supplement .    2. Essential hypertension  Generally goal blood pressure be less than 130 systolically.  She is tolerating amlodipine well without side effects.  Will add a small dose of losartan and repeat BMP in 2 weeks.  - losartan (COZAAR) 25 MG tablet; Take 1 tablet (25 mg total) by mouth daily.  Dispense: 30 tablet; Refill: 11  - amLODIPine (NORVASC) 2.5 MG tablet; Take 1 tablet (2.5 mg total) by mouth 2 (two) times a day.  Dispense: 60 tablet; Refill: 11  - Basic Metabolic Panel; Future    She will schedule a physical with me in the spring.  She will be due for mammogram at that time.  Jessica Diaz MD    Chief Complaint:  Chief Complaint   Patient presents with     Hypertension     Endoscopy     done 11/15/2018- obtaining records       History of Present Illness:  Ora is a 78 y.o. female with history of mild kidney insufficiency, previous cecal volvulus requiring ileostomy, postoperative DVT and PE, cholecystectomy, history of rheumatic fever and seasonal allergies, post operative recurrent anemia (which resolved spontaneously).  She is currently here for follow-up.    Patient did see gastroenterologist and had endoscopy done.  She does have a small segment of Smith's esophagus and small hiatal hernia.  There was no dysphasia on biopsy but some findings of reflexes.  Vagina is well found.  Patient was recommended to take omeprazole 20 mg indefinitely and have repeat endoscopy done in 3 years.  Patient was very concerned about possible effect of omeprazole and on her bones.  Currently she takes calcium and vitamin D supplements and bone density test in 2017  showed mild osteopenia.  Discussed to continue staying active and take appropriate dose of calcium a day.    High blood pressure.  Patient has very slight renal insufficiency.  In the past she was seen renal doctor but has stopped.  Today in the office her blood pressure is good at 124/74 but at home it has been often in 130s.  She is taking amlodipine 2.5 mg twice a day without edema or constipation.  She is interested in lowering her blood pressure little bit more.  We discussed the small dose of losartan to keep her systolic blood pressure below the 130.  Side effects were discussed.  She will have BMP done in 10 days.    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  She denies any pain.  Discussed paraffin treatments.    Review of Systems:  A comprehensive review of systems was performed and was otherwise negative    PFSH:  Social History: Reviewed  Social History     Tobacco Use   Smoking Status Never Smoker   Smokeless Tobacco Never Used     Social History     Social History Narrative    Patient is .  She has 15 children who are all grown up.    She enjoys walking daily for exercise.        Past History: Reviewed  Current Outpatient Medications   Medication Sig Dispense Refill     amLODIPine (NORVASC) 2.5 MG tablet Take 1 tablet (2.5 mg total) by mouth 2 (two) times a day. 60 tablet 11     aspirin 81 MG EC tablet Take 81 mg by mouth daily.       calcium carb &cit-vit D3 (CITRACAL + D SLOW RELEASE) 600 mg calcium- 500 unit TbER Take 1,200 mg by mouth daily.        carboxymethylcellulose (REFRESH PLUS) 0.5 % Dpet ophthalmic dropperette        omeprazole (PRILOSEC) 20 MG capsule        therapeutic multivitamin (THERAGRAN) tablet Take 1 tablet by mouth daily.       UNABLE TO FIND Med Name:Fungi-nail liquid- Apply twice a day       UNABLE TO FIND Med Name:Formula 3, apply twice a day        "azelastine (ASTELIN) 137 mcg (0.1 %) nasal spray One spray  in each nostril twice a day (Patient not taking: Reported on 12/12/2018.      ) 30 mL 12     fluticasone (FLONASE) 50 mcg/actuation nasal spray SPRAY 2 SPRAYS INTO EACH NOSTRILS DAILY (Patient not taking: Reported on 12/12/2018) 48 g 3     losartan (COZAAR) 25 MG tablet Take 1 tablet (25 mg total) by mouth daily. 30 tablet 11     PROPYLENE GLYCOL//PF (SYSTANE, PF, OPHT) Apply 2 drops to eye as needed. Both eyes        No current facility-administered medications for this visit.        Family History: Reviewed    Physical Exam:    Vitals:    12/12/18 1004   BP: 124/74   Patient Site: Left Arm   Patient Position: Sitting   Cuff Size: Adult Regular   Pulse: 93   SpO2: 98%   Weight: 118 lb 8 oz (53.8 kg)   Height: 5' 2\" (1.575 m)     Wt Readings from Last 3 Encounters:   12/12/18 118 lb 8 oz (53.8 kg)   09/06/18 117 lb (53.1 kg)   07/25/18 118 lb (53.5 kg)     Body mass index is 21.67 kg/m .    Constitutional:  Reveals a pleasant female.  Vitals:  Per nursing notes.  HEENT:No cervical LAD, no thyromegaly,  conjunctiva is pink, no scleral icterus, TMs are visualized and normal bl, oropharynx is clear, no exudates,   Cardiac:  Regular rate and rhythm,no murmurs, rubs, or gallops.  Legs without edema. Palpation of the radial pulse regular.  Lungs: Clear to auscultation bl.  Respiratory effort normal.  Abdomen:positive BS, soft, nontender, nondistended.  No hepato-splenomagaly  Skin:   Without rash, bruise, or palpable lesions.  Rheumatologic: No frequent osteoarthritic changes in her hands noted.  There is no swelling.  Neurologic:  Cranial nerves II-XII intact.     Psychiatric: affect appropriate, memory intact.    Data Review:    Analysis and Summary of Old Records (2): Yes     Records Requested (1): Yes endoscopy report from [facility]    Other History Summarized (from other people in the room) (2): No    Radiology Tests Summarized (XRAY/CT/MRI/DXA) " (1): Yes bone density    Labs Reviewed (1): Yes    Medicine Tests Reviewed (EKG/ECHO/COLONOSCOPY/EGD) (1): No    Independent Review of EKG or X-RAY (2): No

## 2021-06-22 NOTE — PROGRESS NOTES
Mental Health Visit Note    11/30/18   Start time: 10:00    Stop Time: 10:50   Session # 26    Ora Og is a 78 y.o. female is being seen today for a follow-up psychotherapy appointment    Chief Complaint   Patient presents with      Follow Up     Anxiety    .     New symptoms or complaints:  Reports stress as son's sandwich shop is closing and he has no transition plans    Functional Impairment:   The patient identifies the how daily stressors affect daily functioning in today's session as follows (Scale is 1-4, 1=not at all or rarely; 4=extremely so/everyday.)    Personal: 2  Family: 2  Social: 2  Work: retired    Clinical assessment of mental status:   Ora Og presented on time.  No changes since last session 11/2/18.  She was oriented x3, open and cooperative, and dressed appropriately for this session and weather. Her memory was Normal cognitive functioning .  Her speech was  Within normal.  Language was talkative, spontaneous, normal.  Concentration and focus is Within normal. Psychosis is not noted or reported. She reports her mood is Congruent w/content of speech.  Affect is congruent with speech and is Congruent w/content of speech.  Fund of knowledge is adequate. Insight is adequate for therapy.      Suicidal/Homicidal Ideation present:   No,  Patient denies suicidal and homicidal ideations/means or plans.      Patient's impression of their current status:  Pt reported cont improved mood and cont setting better boundaries and having more fun in life. She feels good about putting her needs first. Trying to be less judgmental toward self and others. Letting go of need to control. Anxiety over 's negativity and sons problems but praying for relief of anxieties. Active in community, good memory, carries anxiety in chest and encouraged to breathe when anxious and to use CBT. Also discussed ways of expressing needs to .  Feels cont spiritual connection to God and nature, being less  judgmental toward others. Pt cont concerned about son whom she is no longer supporting by letting lease on sandwich shop . She is working on letting go of concerns and trusting that he will fend for himself.       Therapist impression of patient's current state:   Ora Og presents with less depression and anxiety and improved health and cont working on turning over caretaking duties toward friends and family despite their continued problems. Cont letting go of trying to control others and results show in improved demeanor and assertiveness during session. She practicing good self care and is eating, exercising, sleeping well.  Pt attending [employer] and is reading daily meditation. Spirituality is helping her find strength and calmness amidst concerns. Reviewed progress setting boundaries and in self care. Used MI to discuss assisting others and boundaries to asssist pt gain insight into wants and neeeds. Will discuss ways of using that maternal part of self to nurture and express and set boundaries for self using AIR competency based interventions    Type of psychotherapeutic technique provided:   Insight oriented, Client centered and Solution-focused. AIR    Progress toward short term goals: attended  session, setting boundaries, involved with family, not personalizing other peoples problems    Review of long term goals: to improve health and avoid enabling of others, to continue to focus on pursuing meaningful activities, improve conscious contact with higher power, put self first without guilt. Maintain effective boundaries. Date of tx plan review: 10/5/18. Date of next tx plan review: 19.  .    Diagnosis:   1. Adjustment disorder with anxious mood    2. BREN (generalized anxiety disorder)    Improving     Plan and Follow-up:   Patient will follow safety plan of seeking help from friend/family, calling [facility] crisis, calling 911, and/or presenting to the ED if necessary.  Patient will be compliant  with medications and attend appointments as scheduled.  Follow recommendations of medical providers  Cont with [employer] assignment  Discuss 10 yr from now worksheet    Discharge Criteria/Planning: Patient will continue with follow-up until therapy can be discontinued without return of signs and symptoms.    Signatures:   Performed and documented by TABBY Mancini, LICSW, [facility]

## 2021-06-22 NOTE — PROGRESS NOTES
"Mental Health Visit Note    12/28/2018     Start time: 11:00    Stop Time: 12:00  Session # 27    Ora Og is a 78 y.o. female is being seen today for a follow-up psychotherapy appointment    Chief Complaint   Patient presents with      Follow Up     Anxiety     Depression    .     New symptoms or complaints:  Reports managing stress better but 's moods create some distress in her    Functional Impairment:   The patient identifies the how daily stressors affect daily functioning in today's session as follows (Scale is 1-4, 1=not at all or rarely; 4=extremely so/everyday.)    Personal: 2  Family: 2  Social: 2  Work: retired    Clinical assessment of mental status:   Ora Og presented on time.  No changes since last session 11/20/18.  She was oriented x3, open and cooperative, and dressed appropriately for this session and weather. Her memory was Normal cognitive functioning .  Her speech was  Within normal.  Language was talkative, spontaneous, normal.  Concentration and focus is Within normal. Psychosis is not noted or reported. She reports her mood is Congruent w/content of speech.  Affect is congruent with speech and is Congruent w/content of speech.  Fund of knowledge is adequate. Insight is adequate for therapy.      Suicidal/Homicidal Ideation present:   No,  Patient denies suicidal and homicidal ideations/means or plans.      Patient's impression of their current status:  Pt reported cont improved mood and cont setting better boundaries and having more fun in life. Struggling meeting husbands expectations that she be perfect. Also saddened by 's bad moods and dementia. Processed \"protector\" part of self and other parts that can help her with aging process and changes. She feels good about putting her needs first. Trying to be less judgmental toward self and others. Letting go of need to control. Anxiety over 's negativity and sons problems but praying for relief of " anxieties. Active in community, good memory, carries anxiety in chest and encouraged to breathe when anxious and to use CBT. Also discussed ways of expressing needs to .  Feels cont spiritual connection to God and nature, being less judgmental toward others. To complete self compassion SS assignment option 2 about compassion    Therapist impression of patient's current state:   Ora Og presents with less depression and anxiety and improved health and cont working on turning over caretaking duties toward friends and family despite their continued problems. Cont letting go of trying to control others and results show in improved demeanor and assertiveness during session. She cont practicing good self care and is eating, exercising, sleeping well. Pt exploring own needs and expectations for self and . Pt accepting childrens decisions. Pt has easier time noticing what others expectations are for her vs what hers are for others. Exploring her own expectations. Will complete compassion assignment as way of exploring how to be more compassionate with self.  Pt attending [employer] and is reading daily meditation. Spirituality is helping her find strength and calmness amidst concerns. Reviewed progress setting boundaries and in self care. Used MI to discuss assisting others and boundaries to asssist pt gain insight into wants and neeeds. Will discuss ways of using that maternal part of self to nurture and express and set boundaries for self using AIR competency based interventions    Type of psychotherapeutic technique provided:   Insight oriented, Client centered and Solution-focused. AIR    Progress toward short term goals: attended  session, setting boundaries, involved with family, not personalizing other peoples problems    Review of long term goals: to improve health and avoid enabling of others, to continue to focus on pursuing meaningful activities, improve conscious contact with higher power, put  self first without guilt. Maintain effective boundaries. Date of tx plan review: 10/5/18. Date of next tx plan review: 1/5/19.  .    Diagnosis:   1. Adjustment disorder with anxious mood    2. BREN (generalized anxiety disorder)    Improving     Plan and Follow-up:   Patient will follow safety plan of seeking help from friend/family, calling [facility], calling 911, and/or presenting to the ED if necessary.  Patient will be compliant with medications and attend appointments as scheduled.  Follow recommendations of medical providers  Cont compassion option 2 assingment    Discharge Criteria/Planning: Patient will continue with follow-up until therapy can be discontinued without return of signs and symptoms.    Signatures:   Performed and documented by Wali Carrion, TABBY, LICSW, LADC

## 2021-06-23 NOTE — PROGRESS NOTES
"Mental Health Visit Note    1/25/2019     Start time: 10:00    Stop Time: 11:00  Session # 1    Ora Og is a 78 y.o. female is being seen today for a follow-up psychotherapy appointment    Chief Complaint   Patient presents with     MH Follow Up     Anxiety    .     New symptoms or complaints:  Mixed feelings about setting boundaries with others    Functional Impairment:   The patient identifies the how daily stressors affect daily functioning in today's session as follows (Scale is 1-4, 1=not at all or rarely; 4=extremely so/everyday.)    Personal: 2  Family: 2  Social: 2  Work: retired    Clinical assessment of mental status:   Ora Og presented on time.  No changes since last session 12/28/18.  She was oriented x3, open and cooperative, and dressed appropriately for this session and weather. Her memory was Normal cognitive functioning .  Her speech was  Within normal.  Language was talkative, spontaneous, normal.  Concentration and focus is Within normal. Psychosis is not noted or reported. She reports her mood is Congruent w/content of speech.  Affect is congruent with speech and is Congruent w/content of speech.  Fund of knowledge is adequate. Insight is adequate for therapy.      Suicidal/Homicidal Ideation present:   No,  Patient denies suicidal and homicidal ideations/means or plans.      Patient's impression of their current status:  Pt reported cont improved mood and cont setting better boundaries but feels guilty \"haunted\" at having expectations for  because they're unfulfilled and she doesn't feel right to expect things of others that they dont want to do. Reports annalise to people please and works hard to do things for others vs setting boundaries and asking others to do for her. Starting to be more firm in letting go of need to please. Completed self compassion exercise and break meditation during session. Wrote \"compassion is caring for another with kindness and respect, without " "judgment.  It makes no promises.\".  She notes that she would accept herself and would not  herself harshly and she would not take on responsibilities that do not belong to her if she was more compassionate toward herself.  She is finding a balance between caring for her  who is struggling with dementia and depression at the expense of caring for herself and getting her own needs met in relationship.  Patient notes progress in improving mood, diminishing anxiety, and improving relationships as directed and treatment plan.  We updated and agreed to treatment plan goals and objectives and methods in these areas.  Patient is making great progress in treatment plan realization.  Patient notes it is a slippery slope to feel \"haunted\" by expectations for others because that sends her back into her old pattern of putting others needs first and people pleasing in order to create harmony which makes her feel disregarded.  Or neglected    Therapist impression of patient's current state:   Ora Og presents with less depression and anxiety and improved health and cont working on turning over caretaking duties toward friends and family despite their continued problems.  She reports a desire to be more authentic in her life and notes she has made much progress in that direction.  She is able to let go of control and set better boundaries.  She notes that with her children she often waits for them to ask for help versus offering help and that with her son Hira whom she does not know what he is doing now that the sandwich shop is closed, she is able to let him be without questioning him or asking him about his situation.  She is doing an excellent job of detaching.  She is trying to set appropriate boundaries with her  who is struggling with depression and dementia and figuring out how to be a caretaker without neglecting her own needs.  She completed self compassion assignment and we reviewed authenticity " information during session.  She will read authenticity and self compassion chapters for next session.  We participated in self compassion break which patient found very helpful.  We updated and reviewed treatment plan and patient agreed to treatment plan goals and methods.  Reviewed progress setting boundaries and in self care. Used MI to discuss assisting others and boundaries to asssist pt gain insight into wants and neeeds. Will discuss ways of using that maternal part of self to nurture and express and set boundaries for self using AIR competency based interventions    Type of psychotherapeutic technique provided:   Insight oriented, Client centered and Solution-focused. AIR    Progress toward short term goals: attended  session, setting boundaries, involved with family, not personalizing other peoples problems    Review of long term goals: to improve health and avoid enabling of others, to continue to focus on pursuing meaningful activities, improve conscious contact with higher power, put self first without guilt. Maintain effective boundaries. Date of tx plan review: 1/25/2019  . Date of next tx plan review: 4/5/19.  .    Diagnosis:   1. Adjustment disorder with anxious mood    2. BREN (generalized anxiety disorder)    Improving     Plan and Follow-up:   Patient will follow safety plan of seeking help from friend/family, calling [facility], calling 911, and/or presenting to the ED if necessary.  Patient will be compliant with medications and attend appointments as scheduled.  Follow recommendations of medical providers  Cont compassion option 2 assingment    Discharge Criteria/Planning: Patient will continue with follow-up until therapy can be discontinued without return of signs and symptoms.    Signatures:   Performed and documented by Wali Carrion, TABBY, LICSW, LADC

## 2021-06-23 NOTE — PROGRESS NOTES
"Outpatient Mental Health Treatment Plan      Name:  Ora Og  :  1940  MRN:  [id number]      Treatment Plan:  Updated Treatment Plan  Intake/initial treatment plan date:  2017      Benefit and risks and alternatives have been discussed: Yes  Is this treatment appropriate with minimal intrusion/restrictions: Yes  Estimated duration of treatment:  Trial of 30 sessions, to be reviewed.  Anticipated frequency of services:  Every 3 weeks  Necessity for frequency: This frequency is needed to maintain progress with therapeutic goals and for continuity of care in order to monitor progress.  Necessity for treatment: To address cognitive, behavioral, and/or emotional barriers in order to work toward goals and to improve quality of life.          Plan:                                                                                                                                           ?                        ? Anxiety                                            Goal:              Decrease average anxiety level from 2 to 1.                        Strategies:                 ? [x]Continue to practice relaxation techniques and other coping                                                                       strategies (e.g., thought stopping, reframing, meditation)                                                                    ? [x] Increase involvement in activities that bring you pleasure                                                                    ? [x] Practice healthy boundaries by saying \"no\" to requests you don't feel comfortable taking on                                                                    ? [x] Continue to explore thoughts and expectations about self and others                                                                    ? [x] Identify and monitor triggers for panic/anxiety symptoms                                                                    ? [] Implement " physical activity routine (with physician approval)                                                                    ? [x] Practice meditation and prayer on daily basis to increase connection with caring higher power                                                                    ? [x] Continue compliance with medical treatment plan (or explore                                                                    barriers)                              Degree to which this is a problem (1-4): 2   Degree to which goal is met (1-4) 2  Date of Review: 1/25/2019           Adjustment to change in family roles                                     Goal:              Increase % acceptance from 90 to 100.                        Strategies:                 ?[x]  Explore thoughts and expectations about self and others                                                         [x] Cont to explore emotional reactions to alcohol-related problems                                                                    ?[x] Learn and practice relaxation techniques and other coping                                                                       strategies                                                                    [x] Implement physical activity routine (with physician approval)                                                          [x] Continue involvement in community activities of your choosing                                                          [x] Engage in values clarification and goal-setting with self and therapist by completing assignments related to values and goals                                                                    [x] Make efforts to feel confident putting your needs first when appropriate          Date of Review:1/25/2019       Functional Impairment: 1=Not at all/Rarely  2=Some days  3=Most Days  4=Every Day   Personal: 1  Family: 2  Social: 1  Work: 0      Diagnosis:  BREN, Adjustment disorder  with mixed anxiety and depressed mood      WHODAS 2.0 12-item version= 10  H1= 3  H2= 0  H3= 0  Clinical assessments completed: BREN-7, PHQ-9, Mood Questionnaire current, CAGE-AID, PANSI.          Strengths:  Intelligent, caring,healthy, active, good listener, strong desire to find solutions, loving , involved with social network  Limitations:  Severe anxiety, depression, limited ability to change others  Cultural Considerations:  Ora  is a   female with self determination to feel better. She  uses Western medicine and has a strong Jain  [place] . She  has health insurance is able to navigate the system.          Persons responsible for this plan:  ? [x] Patient                     ? [x] Provider                   ? [] Other: __________________          Provider:Performed and documented by MARGARITA Whitley; [facility] 1/25/2019              Date: 1/25/2019          Time:  10:00AM              Patient Signature:____________________________________ Date: ______________           Therapist Signature: __________________________________ Date: ______________

## 2021-06-24 NOTE — PATIENT INSTRUCTIONS - HE
1.       1. Take amlodipine with breakfast and dinner. Continue Losartan with breakfast.    2. Have mammogram and bone density test done in June.    3. EKG today, let me know if irregular heart rate is more frequent.       Patient Education   Personalized Prevention Plan  You are due for the preventive services outlined below.  Your care team is available to assist you in scheduling these services.  If you have already completed any of these items, please share that information with your care team to update in your medical record.  Health Maintenance   Topic Date Due     ZOSTER VACCINES (2 of 3) 05/02/2017     DXA SCAN  09/25/2019     FALL RISK ASSESSMENT  03/13/2020     ADVANCE DIRECTIVES DISCUSSED WITH PATIENT  03/02/2022     TD 18+ HE  09/20/2026     PNEUMOCOCCAL POLYSACCHARIDE VACCINE AGE 65 AND OVER  Completed     INFLUENZA VACCINE RULE BASED  Completed     PNEUMOCOCCAL CONJUGATE VACCINE FOR ADULTS (PCV13 OR PREVNAR)  Completed

## 2021-06-24 NOTE — PROGRESS NOTES
"Mental Health Visit Note    3/8/2019     Start time: 10:00    Stop Time: 11:00  Session # 2    Ora Og is a 78 y.o. female is being seen today for a follow-up individual psychotherapy appointment    Chief Complaint   Patient presents with     MH Follow Up     pt reports desire to be of service to others sometimes overwhelms her and wants to maintain balance in life     Anxiety    .     New symptoms or complaints:  none    Functional Impairment:   The patient identifies the how daily stressors affect daily functioning in today's session as follows (Scale is 1-4, 1=not at all or rarely; 4=extremely so/everyday.)    Personal: 2  Family: 2  Social: 2  Work: retired    Clinical assessment of mental status:   Ora Og presented on time.  No changes since last session 1/25/2019.  She was oriented x3, open and cooperative, and dressed appropriately for this session and weather. Her memory was Normal cognitive functioning .  Her speech was  Within normal.  Language was talkative, spontaneous, normal.  Concentration and focus is Within normal. Psychosis is not noted or reported. She reports her mood is Congruent w/content of speech.  Affect is congruent with speech and is Congruent w/content of speech.  Fund of knowledge is adequate. Insight is adequate for therapy.      Suicidal/Homicidal Ideation present:   No,  Patient denies suicidal and homicidal ideations/means or plans.      Patient's impression of their current status:  Pt reported greater ability to speak assertively with  led to \"outburst\" on her part 1 week ago due to her feelings that he was not being helpful, supportive and attentive enough. Pt proud of self for ability to be honest about her feelings. pt was not blaming, rather honestly expressing long simmering feelings that benefitted from being aired. In past he's responded with silent treatment for weeks to any confrontation but this time he listened. She is torn between genuine desire to " "help others and need to care enough for self. Discussed role supportive father and critical mother play in her internal dialogue leading her to do good works but then criticize self for not doing enough. Pt overall reports less anxiety, greater confidence, gratitude for engagement in community and family. Working on being more compassionate with self. Will use grandmother to do \"rescue missions\" when she takes on too much and still thinks she's not doing enough.  Enjoying time with great grandchildren. Spoke about marriage at 20 yo, 15 children and 's expectation that she be perfect throughout her life, mirroring mothers expectations leading to her tend to comply with others vs stand up for self.    Therapist impression of patient's current state:   Ora Og presents with less depression and anxiety and improved health. She is torn between genuine desire to help others and need to care enough for self. Discussed role supportive father and critical mother play in her internal dialogue leading her to do good works but then criticize self for not doing enough. Pt overall reports less anxiety, greater confidence, gratitude for engagement in community and family. Working on being more compassionate with self. Will use grandmother to do \"rescue missions\" when she takes on too much and still thinks she's not doing enough.  Enjoying time with great grandchildren. Spoke about marriage at 20 yo, 15 children and 's expectation that she be perfect throughout her life, mirroring mothers expectations leading to her tend to comply with others vs stand up for self. and cont working on turning over caretaking duties toward friends and family despite their continued problems.  She is being more authentic in her life and notes she has made much progress in that direction.  She is able to let go of control and set better boundaries.  Updated MH screens in flowsheets.  Used MI to discuss assisting others and " boundaries to asssist pt gain insight into wants and neeeds. Will discuss ways of using that maternal part of self to nurture and express and set boundaries for self using AIR competency based interventions. Pt to read cultivating a resilient spirit handout.     Type of psychotherapeutic technique provided:   Insight oriented, Client centered and Solution-focused. AIR    Progress toward short term goals: attended  session, setting boundaries, involved with family, not personalizing other peoples problems    Review of long term goals: to improve health and avoid enabling of others, to continue to focus on pursuing meaningful activities, improve conscious contact with higher power, put self first without guilt. Maintain effective boundaries. Date of tx plan review: 3/8/2019  . Date of next tx plan review: 4/5/19.  .    Diagnosis:   1. Adjustment disorder with anxious mood    2. BREN (generalized anxiety disorder)    Improving     Plan and Follow-up:   Patient will follow safety plan of seeking help from friend/family, calling [facility] crisis, calling 911, and/or presenting to the ED if necessary.  Patient will be compliant with medications and attend appointments as scheduled.  Follow recommendations of medical providers  Cult resilient spirit reading    Discharge Criteria/Planning: Patient will continue with follow-up until therapy can be discontinued without return of signs and symptoms.    Signatures:   Performed and documented by Wali Carrion, TABBY, LICSW, LADC

## 2021-06-24 NOTE — PROGRESS NOTES
Assessment and Plan:       1. Routine general medical examination at a health care facility  We will do blood work today.  She will be due for repeat endoscopy in 2021.  She has had history of previous colonoscopy in 2013 which was incomplete due to redundant colon.  We did discuss doing a [place] guard but because of the hemorrhoids she is afraid that it will be positive.  Currently she does not want to pursue any more colon cancer screening.  She will schedule mammogram and bone density test in the summer.  - Electrocardiogram Perform - Clinic  - Mammo Screening Bilateral; Future  - DXA Bone Density Scan; Future  - Vitamin D, Total (25-Hydroxy)    2. Essential hypertension  Blood pressure acceptable.  She is on amlodipine twice a day and losartan once a day.  Because in the evening blood pressures are usually higher after dinner we discussed to take second amlodipine dose with dinner.  - Thyroid Stimulating Hormone (TSH)  - LDL Cholesterol, Direct  - Basic Metabolic Panel  - losartan (COZAAR) 25 MG tablet; Take 1 tablet (25 mg total) by mouth daily.  Dispense: 90 tablet; Refill: 2    3. SOB (shortness of breath)  It only happens in cold weather and has not happened recently.    4. 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  No dysplasia or metaplasia was noted.  Repeat endoscopy was recommended in 3 years.  - HM1(CBC and Differential)  - HM1 (CBC with Diff)        The patient's current medical problems were reviewed.      The following health maintenance schedule was reviewed with the patient and provided in printed form in the after visit summary:   Health Maintenance   Topic Date Due     ZOSTER VACCINES (2 of 3) 05/02/2017     DXA SCAN  09/25/2019     FALL RISK ASSESSMENT  03/13/2020     ADVANCE DIRECTIVES DISCUSSED WITH PATIENT  03/02/2022     TD 18+ HE  09/20/2026     PNEUMOCOCCAL POLYSACCHARIDE VACCINE AGE 65 AND OVER  Completed     INFLUENZA VACCINE RULE BASED  Completed     PNEUMOCOCCAL  CONJUGATE VACCINE FOR ADULTS (PCV13 OR PREVNAR)  Completed        Subjective:   Chief Complaint: Ora Og is an 78 y.o. female here for an Annual Wellness visit.     HPI: Ora has h/o  of mild kidney insufficiency, previous cecal volvulus requiring ileostomy, postoperative DVT and PE, cholecystectomy, history of rheumatic fever and seasonal allergies, post operative recurrent anemia (which resolved spontaneously), Chris's esophagus, .seasonal allergies.    I last saw her in December.  Because her blood pressure was borderline at that time we added losartan 25 mg to her amlodipine which she takes twice a day.  Patient has been monitoring blood pressure at home and it fluctuates between 105 and 140 systolically.  In the morning itself is in 120s.  In the evening patient takes it after dinner and is in 140s.  Discussed that she could take her second dose of amlodipine with dinner instead it will keep her blood pressure is more even.    Patient does have history of Smith's esophagus without dysplasia or metaplasia.  Last endoscopy was done in 2018 and 3-year follow-up was recommended.  She is on omeprazole 20 mg a day.    She has had [facility] work done at the [facility] and brought in results.  Her total cholesterol was 179, HDL was 83 and nonfasting glucose was 124.    She also reports that when she checks your blood pressures sometimes monitor tells her that she has irregular heart rate.  She does not experience any palpitations with this and it happens rarely, once every 3-4 months.  For now will monitor it.  Will check EKG today.    She has had mild eczematous patches on her thighs.  Using over-the-counter cortisone cream has helped.    Review of Systems: She wears glasses and sees ophthalmologist yearly.  She does have significant varicose veins and using compression stockings.  She complains about shortness of breath during very cold temperatures outside but has not experienced it recently since weather  warmed up.  Please see above.  The rest of the review of systems are negative for all systems.    Patient Care Team:  Jessica Diaz MD as PCP - General (Internal Medicine)  Vahid Serra MD as Physician (Nephrology)  Joseph Rodriguez MD as Physician (General Surgery)  Alyx Baumann MD as Physician (Hematology and Oncology)     Patient Active Problem List   Diagnosis     Ileostomy present (H)     HTN (hypertension)     Post-op pain     CKD (chronic kidney disease), stage III (H)     Chronic anemia     SOB (shortness of breath)     DVT (deep venous thrombosis) (H)     Bloody stools     Ileus of unspecified type (H)     Intestinal adhesions with complete obstruction (H)     Essential hypertension, benign     Hypomagnesemia     Hypophosphatemia     Anemia     Smith's esophagus without dysplasia     Past Medical History:   Diagnosis Date     Allergic rhinitis      Anemia      Aneurysm of aorta (H)     small noted 2012     Arthritis      Cataracts, bilateral      Cecal volvulus (H)      Chronic kidney disease     Chronic stage 3-4     Clotting disorder (H)      DVT (deep venous thrombosis) (H)      Eczema      Gall stones      GERD (gastroesophageal reflux disease)      Hypertension      Lung nodule     Benign     Osteoporosis      PE (pulmonary embolism) 8/2013    & martha. LE DVT's     Pneumonia      Rheumatic fever      Sepsis (H)     postop, & Acute renal failure      Past Surgical History:   Procedure Laterality Date     CHOLECYSTECTOMY  2012     COLON SURGERY  08/2013    for cecal volvulus-hemicolectomy     DILATION AND CURETTAGE OF UTERUS      x2     EXPLORATORY LAPAROTOMY N/A 10/19/2017    Procedure: LAPAROTOMY LYSIS OF ADHESIONS;  Surgeon: Terry Luna DO;  [place] [facility] [place]:      ILEOSTOMY  9/2013     NH CLOSE ENTEROSTOMY N/A 9/24/2014    Procedure: TAKEDOWN ILEOSTOMY;  Surgeon: Joseph VILLAGRAN MD;  Location: [facility] OR;  Service: General       Family History   Problem Relation Age of Onset     Emphysema Mother      Heart disease Mother      Peripheral vascular disease Father      Stroke Father      Heart disease Father      Heart disease Sister      Hypertension Sister       Social History     Socioeconomic History     Marital status:      Spouse name: Not on file     Number of children: 15     Years of education: Not on file     Highest education level: Not on file   Occupational History     Occupation: Retired   Social Needs     Financial resource strain: Not on file     Food insecurity:     Worry: Not on file     Inability: Not on file     Transportation needs:     Medical: Not on file     Non-medical: Not on file   Tobacco Use     Smoking status: Never Smoker     Smokeless tobacco: Never Used   Substance and Sexual Activity     Alcohol use: Yes     Comment: 1/diego.     Drug use: No     Sexual activity: No   Lifestyle     Physical activity:     Days per week: Not on file     Minutes per session: Not on file     Stress: Not on file   Relationships     Social connections:     Talks on phone: Not on file     Gets together: Not on file     Attends Spiritism service: Not on file     Active member of club or organization: Not on file     Attends meetings of clubs or organizations: Not on file     Relationship status: Not on file     Intimate partner violence:     Fear of current or ex partner: Not on file     Emotionally abused: Not on file     Physically abused: Not on file     Forced sexual activity: Not on file   Other Topics Concern     Not on file   Social History Narrative    Patient is .  She has 15 children who are all grown up.    She enjoys walking daily for exercise.       Current Outpatient Medications   Medication Sig Dispense Refill     amLODIPine (NORVASC) 2.5 MG tablet Take 1 tablet (2.5 mg total) by mouth 2 (two) times a day. 60 tablet 11     aspirin 81 MG EC tablet Take 81 mg by mouth daily.       azelastine (ASTELIN) 137 mcg  "(0.1 %) nasal spray One spray  in each nostril twice a day 30 mL 12     calcium carb &cit-vit D3 (CITRACAL + D SLOW RELEASE) 600 mg calcium- 500 unit TbER Take 1,200 mg by mouth daily.        losartan (COZAAR) 25 MG tablet Take 1 tablet (25 mg total) by mouth daily. 30 tablet 11     omeprazole (PRILOSEC) 20 MG capsule        PROPYLENE GLYCOL//PF (SYSTANE, PF, OPHT) Apply 2 drops to eye as needed. Both eyes        therapeutic multivitamin (THERAGRAN) tablet Take 1 tablet by mouth daily.       UNABLE TO FIND Med Name:Fungi-nail liquid- Apply twice a day       UNABLE TO FIND Med Name:Formula 3, apply twice a day       No current facility-administered medications for this visit.       Objective:   Vital Signs:   Visit Vitals  /70 (Patient Site: Left Arm, Patient Position: Sitting, Cuff Size: Adult Regular)   Pulse 88   Resp 14   Ht 5' 1\" (1.549 m)   Wt 121 lb (54.9 kg)   LMP  (LMP Unknown)   SpO2 98%   BMI 22.86 kg/m         VisionScreening:  Patient wears glasses and sees ophthalmologist annually, she denies any change in her vision    PHYSICAL EXAM  General: well appearing female, alert and oriented x3  EYES: Eyelids, conjunctiva, and sclera were normal. Pupils were normal.   HEAD, EARS, NOSE, MOUTH, AND THROAT: no cervical LAD, no thyromegaly or nodules appreciated. TMs are visualized and normal, oropharynx is clear.  RESPIRATORY: respirations non labored, CTA bl, no wheezes, rales, no forced expiratory wheezing.  CARDIOVASCULAR: Heart rate and rhythm were normal. No murmurs, rubs,gallops.  Valve clicks are noted.  There was no peripheral edema.  But moderately severe varicosities noted.  No carotid bruits.  GASTROINTESTINAL: Positive bowel sounds, abdomen is soft, non tender, non distended.     MUSCULOSKELETAL: Muscle mass was normal for age. No joint synovitis or deformity.  LYMPHATIC: There were no enlarged nodes palpable.  SKIN/HAIR/NAILS: Skin color was normal.  No rashes.  NEUROLOGIC: The patient " was alert and oriented.  Speech was normal.  There is no facial asymmetry.   PSYCHIATRIC:  Mood and affect were normal.   Breast exam: Mckeon lymphadenopathy, breast masses or skin changes appreciated      Assessment Results 3/13/2019   Activities of Daily Living No help needed   Instrumental Activities of Daily Living No help needed   Mini Cog Total Score 5   Some recent data might be hidden     A Mini-Cog score of 0-2 suggests the possibility of dementia, score of 3-5 suggests no dementia    Identified Health Risks:

## 2021-08-03 PROBLEM — K56.7 ILEUS OF UNSPECIFIED TYPE (H): Status: RESOLVED | Noted: 2017-10-18 | Resolved: 2019-06-09

## 2021-08-06 NOTE — PATIENT INSTRUCTIONS - HE
Patient Instructions by Jessica Diaz MD at 9/17/2020 10:15 AM     Author: Jessica Diaz MD Service: -- Author Type: Physician    Filed: 9/17/2020 11:53 AM Encounter Date: 9/17/2020 Status: Addendum    : Jessica Diaz MD (Physician)    Related Notes: Original Note by Jessica Diaz MD (Physician) filed at 9/17/2020 10:51 AM       1.Flu shot and Blood work today.    2. Mammogram and bone DEXA scan are due in March 2021    3. If mood gets worse, or having trouble sleeping, we can always do virtual visit over the winter.    5. Get shingles vaccine at the pharmacy: 2 shots, 2-6 months apart.   Patient Education   Signs of Hearing Loss  Hearing loss is a problem shared by many people. In fact, it is one of the most common health conditions, particularly as people age. Most people over age 65 have some hearing loss, and by age 80, almost everyone does. Because hearing loss usually occurs slowly over the years, you may not realize your hearing ability has gotten worse.       Have your hearing checked  Contact your [facility] care provider if you:    Have to strain to hear normal conversation.    Have to watch other peoples faces very carefully to follow what theyre saying.    Need to ask people to repeat what theyve said.    Often misunderstand what people are saying.    Turn the volume of the television or radio up so high that others complain.    Feel that people are mumbling when theyre talking to you.    Find that the effort to hear leaves you feeling tired and irritated.    Notice, when using the phone, that you hear better with 1 ear than the other.    7781-4153 [employer]. [address] [place] [address], [place] [address]. All rights reserved. This information is not intended as a substitute for professional medical care. Always follow your healthcare professional's instructions.         Patient Education   Your Health Risk Assessment indicates you feel you are not in good emotional  health.    Recreation   Recreation is not limited to sports and team events. It includes any activity that provides relaxation, interest, enjoyment, and exercise. Recreation provides an outlet for physical, mental, and social energy. It can give a sense of worth and achievement. It can help you stay healthy.    Mental Exercise and Social Involvement  Mental and emotional health is as important as physical health. Keep in touch with friends and family. Stay as active as possible. Continue to learn and challenge yourself.   Things you can do to stay mentally active are:    Learn something new, like a foreign language or musical instrument.     Play SCRABBLE or do crossword puzzles. If you cannot find people to play these games with you at home, you can play them with others on your computer through the Internet.     Join a games club--anything from card games to chess or checkers or lawn bowling.     Start a new hobby.     Go back to school.     Volunteer.     Read.     Keep up with world events.       Patient Education   Understanding Advance Care Planning  Advance care planning is the process of deciding ones own future medical care. It helps ensure that if you cant speak for yourself, your wishes can still be carried out. The plan is a series of legal documents that note a persons wishes. The documents vary by state. Advance care planning may be done when a person has a serious illness that is expected to get worse. It may be done before major surgery. And it can help you and your family be prepared in case of a major illness or injury. Advance care planning helps with making decisions at these times.       A health care proxy is a person who acts as the voice of a patient when the patient cant speak for himself or herself. The name of this role varies by state. It may be called a Durable Medical Power of  or Durable Power of  for Healthcare. It may be called an agent, surrogate, or advocate. Or it  may be called a representative or decision maker. It is an official duty that is identified by a legal document. The document also varies by state.    Why Is Advance Care Planning Important?  If a person communicates their healthcare wishes:    They will be given medical care that matches their values and goals.    Their family members will not be forced to make decisions in a crisis with no guidance.  Creating a Plan  Making an advance care plan is often done in 3 steps:    Thinking about ones wishes. To create an advance care plan, you should think about what kind of medical treatment you would want if you lose the ability to communicate. Are there any situations in which you would refuse or stop treatment? Are there therapies you would want or not want? And whom do you want to make decisions for you? There are many places to learn more about how to plan for your care. Ask your doctor or  for resources.    Picking a health care proxy. This means choosing a trusted person to speak for you only when you cant speak for yourself. When you cannot make medical decisions, your proxy makes sure the instructions in your advance care plan are followed. A proxy does not make decisions based on his or her own opinions. They must put aside those opinions and values if needed, and carry out your wishes.    Filling out the legal documents. There are several kinds of legal documents for advance care planning. Each one tells health care providers your wishes. The documents may vary by state. They must be signed and may need to be witnessed or notarized. You can cancel or change them whenever you wish. Depending on your state, the documents may include a Healthcare Proxy form, Living Will, Durable Medical Power of , Advance Directive, or others.  The Familys Role  The best help a family can give is to support their loved ones wishes. Open and honest communication is vital. Family should express any concerns  they have about the patients choices while the patient can still make decisions.    3118-2896 [employer]. [address] [place] [address], [place] [address]. All rights reserved. This information is not intended as a substitute for professional medical care. Always follow your healthcare professional's instructions.         Also, [facility] offers a free, downloadable health care directive that allows you to share your treatment choices and personal preferences if you cannot communicate your wishes. It also allows you to appoint another person (called a health care agent) to make health care decisions if you are unable to do so. You can download an advance directive by going here: [email or web address]     Patient Education   Personalized Prevention Plan  You are due for the preventive services outlined below.  Your care team is available to assist you in scheduling these services.  If you have already completed any of these items, please share that information with your care team to update in your medical record.  Health Maintenance   Topic Date Due   ? ZOSTER VACCINES (2 of 3) 05/02/2017   ? MEDICARE ANNUAL WELLNESS VISIT  03/13/2020   ? INFLUENZA VACCINE RULE BASED (1) 08/01/2020   ? FALL RISK ASSESSMENT  09/17/2021   ? DXA SCAN  09/27/2021   ? ADVANCE CARE PLANNING  03/02/2022   ? LIPID  03/06/2022   ? TD 18+ HE  09/20/2026   ? PNEUMOCOCCAL IMMUNIZATION 65+ HIGH/HIGHEST RISK  Completed   ? HEPATITIS B VACCINES  Aged Out

## 2021-08-17 DIAGNOSIS — I10 ESSENTIAL HYPERTENSION: ICD-10-CM

## 2021-08-20 RX ORDER — LOSARTAN POTASSIUM 25 MG/1
TABLET ORAL
Qty: 90 TABLET | Refills: 0 | Status: SHIPPED | OUTPATIENT
Start: 2021-08-20 | End: 2021-11-17

## 2021-08-20 NOTE — TELEPHONE ENCOUNTER
"Last Written Prescription Date:  12/1/2020  Last Fill Quantity: 90,  # refills: 2   Last office visit provider:  9/17/2020 Dr. Diaz      Disp Refills Start End DEB   losartan (COZAAR) 25 MG tablet 90 tablet 2 12/1/2020  No   Sig: TAKE ONE TABLET BY MOUTH DAILY   Sent to pharmacy as: losartan 25 mg tablet (COZAAR)   E-Prescribing Status: Receipt confirmed by pharmacy (12/1/2020 10:41 AM CST         Requested Prescriptions   Pending Prescriptions Disp Refills     losartan (COZAAR) 25 MG tablet [Pharmacy Med Name: LOSARTAN POTASSIUM 25 MG TA 25 Tablet] 90 tablet 2     Sig: TAKE 1 TABLET BY MOUTH DAILY       Angiotensin-II Receptors Passed - 8/17/2021 11:24 AM        Passed - Last blood pressure under 140/90 in past 12 months     BP Readings from Last 3 Encounters:   09/17/20 128/80   12/05/19 124/70                 Passed - Recent (12 mo) or future (30 days) visit within the authorizing provider's specialty     Patient has had an office visit with the authorizing provider or a provider within the authorizing providers department within the previous 12 mos or has a future within next 30 days. See \"Patient Info\" tab in inbasket, or \"Choose Columns\" in Meds & Orders section of the refill encounter.              Passed - Medication is active on med list        Passed - Patient is age 18 or older        Passed - No active pregnancy on record        Passed - Normal serum creatinine on file in past 12 months     Recent Labs   Lab Test 09/17/20  1056   CR 0.89       Ok to refill medication if creatinine is low          Passed - Normal serum potassium on file in past 12 months     Recent Labs   Lab Test 09/17/20  1056   POTASSIUM 4.7                    Passed - No positive pregnancy test in past 12 months             Arti Freire RN 08/20/21 3:42 PM  "

## 2021-09-09 DIAGNOSIS — I10 ESSENTIAL HYPERTENSION: ICD-10-CM

## 2021-09-10 RX ORDER — AMLODIPINE BESYLATE 2.5 MG/1
TABLET ORAL
Qty: 180 TABLET | Refills: 2 | Status: SHIPPED | OUTPATIENT
Start: 2021-09-10 | End: 2022-06-15

## 2021-09-10 NOTE — TELEPHONE ENCOUNTER
"Routing refill request to provider for review/approval because:  A break in medication - PCP please advise.     Last Written Prescription Date:  12/1/2020  Last Fill Quantity: 180,  # refills: 2   Last office visit provider:  9/17/2020     Requested Prescriptions   Pending Prescriptions Disp Refills     amLODIPine (NORVASC) 2.5 MG tablet [Pharmacy Med Name: AMLODIPINE BESYLATE 2.5 MG 2.5 Tablet] 180 tablet 2     Sig: TAKE 1 TABLET BY MOUTH 2 TIMES A DAY       Calcium Channel Blockers Protocol  Passed - 9/9/2021 10:01 AM        Passed - Blood pressure under 140/90 in past 12 months     BP Readings from Last 3 Encounters:   09/17/20 128/80   12/05/19 124/70                 Passed - Recent (12 mo) or future (30 days) visit within the authorizing provider's specialty     Patient has had an office visit with the authorizing provider or a provider within the authorizing providers department within the previous 12 mos or has a future within next 30 days. See \"Patient Info\" tab in inbasket, or \"Choose Columns\" in Meds & Orders section of the refill encounter.              Passed - Medication is active on med list        Passed - Patient is age 18 or older        Passed - No active pregnancy on record        Passed - Normal serum creatinine on file in past 12 months     Recent Labs   Lab Test 09/17/20  1056   CR 0.89       Ok to refill medication if creatinine is low          Passed - No positive pregnancy test in past 12 months             Cheri Headley RN 09/09/21 11:20 PM  "

## 2021-09-28 ENCOUNTER — ANCILLARY PROCEDURE (OUTPATIENT)
Dept: MAMMOGRAPHY | Facility: CLINIC | Age: 81
End: 2021-09-28
Attending: INTERNAL MEDICINE
Payer: COMMERCIAL

## 2021-09-28 ENCOUNTER — ANCILLARY PROCEDURE (OUTPATIENT)
Dept: BONE DENSITY | Facility: CLINIC | Age: 81
End: 2021-09-28
Attending: INTERNAL MEDICINE
Payer: COMMERCIAL

## 2021-09-28 DIAGNOSIS — Z78.0 MENOPAUSE: ICD-10-CM

## 2021-09-28 DIAGNOSIS — Z78.0 POST-MENOPAUSAL: ICD-10-CM

## 2021-09-28 DIAGNOSIS — Z12.31 ENCOUNTER FOR SCREENING MAMMOGRAM FOR BREAST CANCER: ICD-10-CM

## 2021-09-28 PROCEDURE — 77080 DXA BONE DENSITY AXIAL: CPT | Performed by: INTERNAL MEDICINE

## 2021-09-28 PROCEDURE — 77067 SCR MAMMO BI INCL CAD: CPT | Mod: TC | Performed by: RADIOLOGY

## 2021-10-08 ASSESSMENT — ACTIVITIES OF DAILY LIVING (ADL): CURRENT_FUNCTION: NO ASSISTANCE NEEDED

## 2021-10-11 ENCOUNTER — OFFICE VISIT (OUTPATIENT)
Dept: INTERNAL MEDICINE | Facility: CLINIC | Age: 81
End: 2021-10-11
Payer: COMMERCIAL

## 2021-10-11 VITALS
SYSTOLIC BLOOD PRESSURE: 130 MMHG | WEIGHT: 124.1 LBS | DIASTOLIC BLOOD PRESSURE: 70 MMHG | HEART RATE: 89 BPM | BODY MASS INDEX: 23.45 KG/M2 | OXYGEN SATURATION: 100 %

## 2021-10-11 DIAGNOSIS — Z23 HIGH PRIORITY FOR 2019-NCOV VACCINE: ICD-10-CM

## 2021-10-11 DIAGNOSIS — K22.70 BARRETT'S ESOPHAGUS WITHOUT DYSPLASIA: ICD-10-CM

## 2021-10-11 DIAGNOSIS — Z63.6 CAREGIVER STRESS: ICD-10-CM

## 2021-10-11 DIAGNOSIS — Z00.00 ENCOUNTER FOR MEDICARE ANNUAL WELLNESS EXAM: Primary | ICD-10-CM

## 2021-10-11 DIAGNOSIS — E55.9 VITAMIN D DEFICIENCY: ICD-10-CM

## 2021-10-11 DIAGNOSIS — I10 PRIMARY HYPERTENSION: ICD-10-CM

## 2021-10-11 LAB
ALBUMIN SERPL-MCNC: 3.9 G/DL (ref 3.5–5)
ALP SERPL-CCNC: 65 U/L (ref 45–120)
ALT SERPL W P-5'-P-CCNC: 13 U/L (ref 0–45)
ANION GAP SERPL CALCULATED.3IONS-SCNC: 7 MMOL/L (ref 5–18)
AST SERPL W P-5'-P-CCNC: 24 U/L (ref 0–40)
BASOPHILS # BLD AUTO: 0 10E3/UL (ref 0–0.2)
BASOPHILS NFR BLD AUTO: 1 %
BILIRUB SERPL-MCNC: 0.6 MG/DL (ref 0–1)
BUN SERPL-MCNC: 18 MG/DL (ref 8–28)
CALCIUM SERPL-MCNC: 10 MG/DL (ref 8.5–10.5)
CHLORIDE BLD-SCNC: 104 MMOL/L (ref 98–107)
CHOLEST SERPL-MCNC: 186 MG/DL
CO2 SERPL-SCNC: 29 MMOL/L (ref 22–31)
CREAT SERPL-MCNC: 0.83 MG/DL (ref 0.6–1.1)
EOSINOPHIL # BLD AUTO: 0.1 10E3/UL (ref 0–0.7)
EOSINOPHIL NFR BLD AUTO: 3 %
ERYTHROCYTE [DISTWIDTH] IN BLOOD BY AUTOMATED COUNT: 13.9 % (ref 10–15)
FASTING STATUS PATIENT QL REPORTED: YES
GFR SERPL CREATININE-BSD FRML MDRD: 67 ML/MIN/1.73M2
GLUCOSE BLD-MCNC: 94 MG/DL (ref 70–125)
HCT VFR BLD AUTO: 37.4 % (ref 35–47)
HDLC SERPL-MCNC: 83 MG/DL
HGB BLD-MCNC: 11.7 G/DL (ref 11.7–15.7)
IMM GRANULOCYTES # BLD: 0 10E3/UL
IMM GRANULOCYTES NFR BLD: 0 %
LDLC SERPL CALC-MCNC: 89 MG/DL
LYMPHOCYTES # BLD AUTO: 1 10E3/UL (ref 0.8–5.3)
LYMPHOCYTES NFR BLD AUTO: 21 %
MCH RBC QN AUTO: 29.2 PG (ref 26.5–33)
MCHC RBC AUTO-ENTMCNC: 31.3 G/DL (ref 31.5–36.5)
MCV RBC AUTO: 93 FL (ref 78–100)
MONOCYTES # BLD AUTO: 0.5 10E3/UL (ref 0–1.3)
MONOCYTES NFR BLD AUTO: 10 %
NEUTROPHILS # BLD AUTO: 3.2 10E3/UL (ref 1.6–8.3)
NEUTROPHILS NFR BLD AUTO: 66 %
PLATELET # BLD AUTO: 165 10E3/UL (ref 150–450)
POTASSIUM BLD-SCNC: 4.9 MMOL/L (ref 3.5–5)
PROT SERPL-MCNC: 7 G/DL (ref 6–8)
RBC # BLD AUTO: 4.01 10E6/UL (ref 3.8–5.2)
SODIUM SERPL-SCNC: 140 MMOL/L (ref 136–145)
TRIGL SERPL-MCNC: 72 MG/DL
TSH SERPL DL<=0.005 MIU/L-ACNC: 1.03 UIU/ML (ref 0.3–5)
WBC # BLD AUTO: 4.8 10E3/UL (ref 4–11)

## 2021-10-11 PROCEDURE — 99397 PER PM REEVAL EST PAT 65+ YR: CPT | Mod: 25 | Performed by: INTERNAL MEDICINE

## 2021-10-11 PROCEDURE — 82306 VITAMIN D 25 HYDROXY: CPT | Performed by: INTERNAL MEDICINE

## 2021-10-11 PROCEDURE — 85025 COMPLETE CBC W/AUTO DIFF WBC: CPT | Performed by: INTERNAL MEDICINE

## 2021-10-11 PROCEDURE — 0004A COVID-19,PF,PFIZER (12+ YRS): CPT | Performed by: INTERNAL MEDICINE

## 2021-10-11 PROCEDURE — 91300 COVID-19,PF,PFIZER (12+ YRS): CPT | Performed by: INTERNAL MEDICINE

## 2021-10-11 PROCEDURE — 36415 COLL VENOUS BLD VENIPUNCTURE: CPT | Performed by: INTERNAL MEDICINE

## 2021-10-11 PROCEDURE — 80061 LIPID PANEL: CPT | Performed by: INTERNAL MEDICINE

## 2021-10-11 PROCEDURE — 84443 ASSAY THYROID STIM HORMONE: CPT | Performed by: INTERNAL MEDICINE

## 2021-10-11 PROCEDURE — 80053 COMPREHEN METABOLIC PANEL: CPT | Performed by: INTERNAL MEDICINE

## 2021-10-11 PROCEDURE — 99214 OFFICE O/P EST MOD 30 MIN: CPT | Mod: 25 | Performed by: INTERNAL MEDICINE

## 2021-10-11 SDOH — SOCIAL STABILITY - SOCIAL INSECURITY: DEPENDENT RELATIVE NEEDING CARE AT HOME: Z63.6

## 2021-10-11 ASSESSMENT — ACTIVITIES OF DAILY LIVING (ADL): CURRENT_FUNCTION: NO ASSISTANCE NEEDED

## 2021-10-11 NOTE — PATIENT INSTRUCTIONS
1. Have EGD done in November/December    2. B complex supplement daily (since omeprazole affects B vitamin absorbtion).    3. Continue walking and resistance exercises for hips density.    4. Pfizer booster today, Flu shot later on, early November    5. Shingles vaccine down the road ( 2 shots 2-6 months apart)     Patient Education   Personalized Prevention Plan  You are due for the preventive services outlined below.  Your care team is available to assist you in scheduling these services.  If you have already completed any of these items, please share that information with your care team to update in your medical record.  Health Maintenance Due   Topic Date Due     ANNUAL REVIEW OF  ORDERS  Never done     Zoster (Shingles) Vaccine (2 of 3) 05/02/2017     Flu Vaccine (1) 09/01/2021     FALL RISK ASSESSMENT  09/17/2021       Signs of Hearing Loss      Hearing much better with one ear can be a sign of hearing loss.   Hearing loss is a problem shared by many people. In fact, it is one of the most common health problems, particularly as people age. Most people age 65 and older have some hearing loss. By age 80, almost everyone does. Hearing loss often occurs slowly over the years. So you may not realize your hearing has gotten worse.  Have your hearing checked  Call your healthcare provider if you:    Have to strain to hear normal conversation    Have to watch other people s faces very carefully to follow what they re saying    Need to ask people to repeat what they ve said    Often misunderstand what people are saying    Turn the volume of the television or radio up so high that others complain    Feel that people are mumbling when they re talking to you    Find that the effort to hear leaves you feeling tired and irritated    Notice, when using the phone, that you hear better with one ear than the other  [employer] last reviewed this educational content on 1/1/2020 2000-2021 [employer]. All rights  reserved. This information is not intended as a substitute for professional medical care. Always follow your healthcare professional's instructions.

## 2021-10-11 NOTE — PROGRESS NOTES
"Answers for HPI/ROS submitted by the patient on 10/8/2021  In general, how would you rate your overall physical health?: good  Frequency of exercise:: 6-7 days/week  Do you usually eat at least 4 servings of fruit and vegetables a day, include whole grains & fiber, and avoid regularly eating high fat or \"junk\" foods? : Yes  Medication side effects:: None  Activities of Daily Living: no assistance needed  Home safety: no safety concerns identified  Hearing Impairment:: difficulty following a conversation in a noisy restaurant or crowded room, feel that people are mumbling or not speaking clearly, difficult to understand a speaker at a public meeting or Sabianism service, need to ask people to speak up or repeat themselves, find that men's voices are easier to understand than woman's, difficulty understanding soft or whispered speech, difficulty understanding speech on the telephone  In the past 6 months, have you been bothered by leaking of urine?: No  In general, how would you rate your overall mental or emotional health?: good  Additional concerns today:: No  Duration of exercise:: 30-45 minutes    SUBJECTIVE:   Ora Og is a 81 year old female who presents for Preventive Visit.    Ora is a 79 y.o. female with h/o  of mild kidney insufficiency, previous cecal volvulus requiring ileostomy, postoperative DVT and PE, cholecystectomy, history of rheumatic fever and seasonal allergies, post operative recurrent anemia (which resolved spontaneously), Chris's esophagus, .seasonal allergies.  She denies any concerns or complaints today.    She was concerned about recent bone density reports which showed osteopenia and decline in her bone mass.  We did review that test with her.  Her osteopenia is mild given her age.  Fracture risk is moderate but not high.  Discussed that it is okay to continue monitoring for now.  She is already drinking 2 glasses of milk a day and takes additional calcium.  Discussed not to exceed " "1200 mg of total calcium in a day.  She takes vitamin D supplements and will check levels.  Discussed resistant exercises.  She denies any balance problems or recent falls.    Blood pressure today is 130/70, at home it is usually 120s so slightly low.  She denies any dizziness or lightheadedness.    She does have history of Smith's esophagus and currently is on PPI.  She also has history of hiatal hernia.  She is due for repeat endoscopy this year.  Denies any acid reflux or difficulty swallowing.    She continues to have a lot of stress: Her  has Parkinson's disease and dependent on her for ADLs.  They do have nurses that come in twice a months to help set up medications.  Patient also has 15 children who help her.  She denies worsening depression or anxiety and has had good benefit from regular exercise and walking.      patient has been advised of split billing requirements and indicates understanding: Yes   Are you in the first 12 months of your Medicare coverage?  No    Healthy Habits:     In general, how would you rate your overall health?  Good    Frequency of exercise:  6-7 days/week    Duration of exercise:  30-45 minutes    Do you usually eat at least 4 servings of fruit and vegetables a day, include whole grains    & fiber and avoid regularly eating high fat or \"junk\" foods?  Yes    Medication side effects:  None    Ability to successfully perform activities of daily living:  No assistance needed    Home Safety:  No safety concerns identified    Hearing Impairment:  Difficulty following a conversation in a noisy restaurant or crowded room, feel that people are mumbling or not speaking clearly, difficult to understand a speaker at a public meeting or Oriental orthodox service, need to ask people to speak up or repeat themselves, find that men's voices are easier to understand than woman's, difficulty understanding soft or whispered speech and difficulty understanding speech on the telephone    In the past " 6 months, have you been bothered by leaking of urine?  No    In general, how would you rate your overall mental or emotional health?  Good      PHQ-2 Total Score: 2    Additional concerns today:  No    Do you feel safe in your environment? Yes    Have you ever done Advance Care Planning? (For example, a Health Directive, POLST, or a discussion with a medical provider or your loved ones about your wishes): Yes, advance care planning is on file.  Fall risk   Cognitive Screening   1) Repeat 3 items (Leader, Season, Table)      2) Clock draw: NORMAL  3) 3 item recall: Recalls 3 objects  Results: 3 items recalled: COGNITIVE IMPAIRMENT LESS LIKELY  Reviewed and updated as needed this visit by clinical staff  Tobacco  Allergies  Meds   Med Hx  Surg Hx  Fam Hx  Soc Hx        Reviewed and updated as needed this visit by Provider                Social History     Tobacco Use     Smoking status: Never Smoker     Smokeless tobacco: Never Used   Substance Use Topics     Alcohol use: Yes     Comment: Alcoholic Drinks/day: 1/eve.       Alcohol Use 10/8/2021   Prescreen: >3 drinks/day or >7 drinks/week? No   Patient Care Team:  Jessica Diaz MD as PCP - General  Alyx Baumann MD as MD (Hematology & Oncology)  Jessica Diaz MD as Assigned PCP    The following health maintenance items are reviewed in Epic and correct as of today:  Health Maintenance Due   Topic Date Due     ANNUAL REVIEW OF HM ORDERS  Never done     ADVANCE CARE PLANNING  Never done     ZOSTER IMMUNIZATION (2 of 3) 05/02/2017     INFLUENZA VACCINE (1) 09/01/2021     FALL RISK ASSESSMENT  09/17/2021     MEDICARE ANNUAL WELLNESS VISIT  09/17/2021     {Pertinent mammograms are reviewed under the imaging tab.    Review of Systems  As above, she recently saw ophthalmologist and got new glasses.  She has hearing aids and sees her hearing specialist annually.  She denies any recent falls or balance issues.  She sleeps well.  Over the summer when  "she was walking she would have occasional shortness of breath due to smoke but currently it has resolved.  She denies any cough, no chest pains or palpitations.  No changes in her bowels.  No urinary problems, no vaginal spotting.    OBJECTIVE:   There were no vitals taken for this visit. Estimated body mass index is 23.29 kg/m  as calculated from the following:    Height as of 9/17/20: 1.549 m (5' 1\").    Weight as of 9/17/20: 55.9 kg (123 lb 4 oz).  Physical Exam  General: well appearing female, looks younger stated age, alert and oriented x3  EYES: Eyelids, conjunctiva, and sclera were normal. Pupils were normal.   HEAD, EARS, NOSE, MOUTH, AND THROAT: no cervical LAD, no thyromegaly or nodules appreciated. TMs are visualized and normal, oropharynx is clear.  RESPIRATORY: respirations non labored, CTA bl, no wheezes, rales, no forced expiratory wheezing.  CARDIOVASCULAR: Heart rate and rhythm were normal. No murmurs, rubs,gallops. There was no peripheral edema.   GASTROINTESTINAL: Positive bowel sounds, abdomen is soft, non tender, non distended.     MUSCULOSKELETAL: Muscle mass was normal for age. No joint synovitis or deformity.  LYMPHATIC: There were no enlarged nodes palpable.  SKIN/HAIR/NAILS: Skin color was normal.  No rashes.  NEUROLOGIC: The patient was alert and oriented.  Speech was normal.  There is no facial asymmetry.   PSYCHIATRIC:  Mood and affect were normal.   Breast exam: No axilla lymphadenopathy, breast masses or skin changes appreciated.      ASSESSMENT / PLAN:   Ora was seen today for physical and imm/inj.    Diagnoses and all orders for this visit:    Encounter for Medicare annual wellness exam  Due to her age recommended Pfizer booster vaccine today which she got and she will get flu shot couple of weeks later.  She will continue resistant exercises and DEXA scan will be due in 2 years.  Mammogram was reviewed and was normal this year, will continue screening giving her overall good " "general health.  Gwen to inquire about shingles vaccine at the pharmacy as well.  -     COVID-19,PF,PFIZER  -     REVIEW OF HEALTH MAINTENANCE PROTOCOL ORDERS    Primary hypertension  She is on small dose of amlodipine and losartan.  She is tolerating it well and blood pressure at home is at goal.  -     Comprehensive metabolic panel  -     Lipid panel reflex to direct LDL Fasting  -     TSH    Smith's esophagus without dysplasia  History of endoscopy in 2018 which showed Smith's esophagus without dysplasia and hiatal hernia.  She is on PPI.  She is due for repeat endoscopy in November of this year.  -     Adult Gastro Ref - Procedure Only; Future  -     CBC with platelets and differential    Caregiver stress  She denies depression or anxiety.  Currently does not want to add more medical appointments with psychologist referral.  Discussed to let me know in the future if mood gets worse we can always refer her to psychology and consider medication if needed.    Vitamin D deficiency  -     Vitamin D Deficiency          Estimated body mass index is 23.29 kg/m  as calculated from the following:    Height as of 9/17/20: 1.549 m (5' 1\").    Weight as of 9/17/20: 55.9 kg (123 lb 4 oz).    She reports that she has never smoked. She has never used smokeless tobacco.      Appropriate preventive services were discussed with this patient, including applicable screening as appropriate for cardiovascular disease, diabetes, osteopenia/osteoporosis, and glaucoma.  As appropriate for age/gender, discussed screening for colorectal cancer, prostate cancer, breast cancer, and cervical cancer. Checklist reviewing preventive services available has been given to the patient.    Reviewed patients plan of care and provided an AVS. The Basic Care Plan (routine screening as documented in Health Maintenance) for Ora meets the Care Plan requirement. This Care Plan has been established and reviewed with the Patient.    Jessica Diaz, " MD [facility]    Identified Health Risks:

## 2021-10-11 NOTE — PROGRESS NOTES
"    The patient was provided with written information regarding signs of hearing loss.  Answers for HPI/ROS submitted by the patient on 10/8/2021  In general, how would you rate your overall physical health?: good  Frequency of exercise:: 6-7 days/week  Do you usually eat at least 4 servings of fruit and vegetables a day, include whole grains & fiber, and avoid regularly eating high fat or \"junk\" foods? : Yes  Medication side effects:: None  Activities of Daily Living: no assistance needed  Home safety: no safety concerns identified  Hearing Impairment:: difficulty following a conversation in a noisy restaurant or crowded room, feel that people are mumbling or not speaking clearly, difficult to understand a speaker at a public meeting or Baptism service, need to ask people to speak up or repeat themselves, find that men's voices are easier to understand than woman's, difficulty understanding soft or whispered speech, difficulty understanding speech on the telephone  In the past 6 months, have you been bothered by leaking of urine?: No  In general, how would you rate your overall mental or emotional health?: good  Additional concerns today:: No  Duration of exercise:: 30-45 minutes        "

## 2021-10-12 LAB — DEPRECATED CALCIDIOL+CALCIFEROL SERPL-MC: 43 UG/L (ref 30–80)

## 2021-10-17 ENCOUNTER — HEALTH MAINTENANCE LETTER (OUTPATIENT)
Age: 81
End: 2021-10-17

## 2021-10-29 DIAGNOSIS — K22.70 BARRETT'S ESOPHAGUS WITHOUT DYSPLASIA: ICD-10-CM

## 2021-10-29 DIAGNOSIS — K21.00 GASTROESOPHAGEAL REFLUX DISEASE WITH ESOPHAGITIS: ICD-10-CM

## 2021-10-31 NOTE — TELEPHONE ENCOUNTER
"Last Written Prescription Date:  9/17/20  Last Fill Quantity: 30,  # refills: 11   Last office visit provider:  9/17/20     Requested Prescriptions   Pending Prescriptions Disp Refills     omeprazole (PRILOSEC) 20 MG DR capsule [Pharmacy Med Name: OMEPRAZOLE 20 MG CPDR 20 Capsule] 30 capsule 11     Sig: TAKE 1 CAPSULE ( 20 MG ) BY MOUTH DAILY BEFORE DINNER       PPI Protocol Passed - 10/29/2021  2:09 PM        Passed - Not on Clopidogrel (unless Pantoprazole ordered)        Passed - No diagnosis of osteoporosis on record        Passed - Recent (12 mo) or future (30 days) visit within the authorizing provider's specialty     Patient has had an office visit with the authorizing provider or a provider within the authorizing providers department within the previous 12 mos or has a future within next 30 days. See \"Patient Info\" tab in inbasket, or \"Choose Columns\" in Meds & Orders section of the refill encounter.              Passed - Medication is active on med list        Passed - Patient is age 18 or older        Passed - No active pregnacy on record        Passed - No positive pregnancy test in past 12 months             shahid campbell RN 10/31/21 8:09 AM  "

## 2021-11-16 DIAGNOSIS — I10 ESSENTIAL HYPERTENSION: ICD-10-CM

## 2021-11-17 RX ORDER — LOSARTAN POTASSIUM 25 MG/1
TABLET ORAL
Qty: 90 TABLET | Refills: 3 | Status: SHIPPED | OUTPATIENT
Start: 2021-11-17 | End: 2022-10-26

## 2021-11-18 NOTE — TELEPHONE ENCOUNTER
"Last Written Prescription Date:  8/20/21  Last Fill Quantity: 90,  # refills: 0   Last office visit provider:  10/11/21     Requested Prescriptions   Pending Prescriptions Disp Refills     losartan (COZAAR) 25 MG tablet [Pharmacy Med Name: LOSARTAN POTASSIUM 25 MG TA 25 Tablet] 90 tablet 0     Sig: TAKE 1 TABLET BY MOUTH DAILY       Angiotensin-II Receptors Passed - 11/16/2021 10:05 AM        Passed - Last blood pressure under 140/90 in past 12 months     BP Readings from Last 3 Encounters:   10/11/21 130/70   09/17/20 128/80   12/05/19 124/70                 Passed - Recent (12 mo) or future (30 days) visit within the authorizing provider's specialty     Patient has had an office visit with the authorizing provider or a provider within the authorizing providers department within the previous 12 mos or has a future within next 30 days. See \"Patient Info\" tab in inbasket, or \"Choose Columns\" in Meds & Orders section of the refill encounter.              Passed - Medication is active on med list        Passed - Patient is age 18 or older        Passed - No active pregnancy on record        Passed - Normal serum creatinine on file in past 12 months     Recent Labs   Lab Test 10/11/21  1148   CR 0.83       Ok to refill medication if creatinine is low          Passed - Normal serum potassium on file in past 12 months     Recent Labs   Lab Test 10/11/21  1148   POTASSIUM 4.9                    Passed - No positive pregnancy test in past 12 months             Heather Crump RN 11/17/21 8:01 PM  "

## 2021-12-13 DIAGNOSIS — Z11.59 ENCOUNTER FOR SCREENING FOR OTHER VIRAL DISEASES: ICD-10-CM

## 2021-12-23 ENCOUNTER — OFFICE VISIT (OUTPATIENT)
Dept: INTERNAL MEDICINE | Facility: CLINIC | Age: 81
End: 2021-12-23
Payer: COMMERCIAL

## 2021-12-23 VITALS — HEART RATE: 84 BPM | DIASTOLIC BLOOD PRESSURE: 59 MMHG | SYSTOLIC BLOOD PRESSURE: 115 MMHG | OXYGEN SATURATION: 96 %

## 2021-12-23 DIAGNOSIS — Z01.818 PRE-OP EXAM: Primary | ICD-10-CM

## 2021-12-23 DIAGNOSIS — K22.70 BARRETT'S ESOPHAGUS WITHOUT DYSPLASIA: ICD-10-CM

## 2021-12-23 DIAGNOSIS — I10 PRIMARY HYPERTENSION: ICD-10-CM

## 2021-12-23 LAB
ANION GAP SERPL CALCULATED.3IONS-SCNC: 11 MMOL/L (ref 5–18)
BUN SERPL-MCNC: 20 MG/DL (ref 8–28)
CALCIUM SERPL-MCNC: 9.9 MG/DL (ref 8.5–10.5)
CHLORIDE BLD-SCNC: 102 MMOL/L (ref 98–107)
CO2 SERPL-SCNC: 26 MMOL/L (ref 22–31)
CREAT SERPL-MCNC: 0.97 MG/DL (ref 0.6–1.1)
GFR SERPL CREATININE-BSD FRML MDRD: 59 ML/MIN/1.73M2
GLUCOSE BLD-MCNC: 124 MG/DL (ref 70–125)
POTASSIUM BLD-SCNC: 4 MMOL/L (ref 3.5–5)
SODIUM SERPL-SCNC: 139 MMOL/L (ref 136–145)

## 2021-12-23 PROCEDURE — 99214 OFFICE O/P EST MOD 30 MIN: CPT | Performed by: INTERNAL MEDICINE

## 2021-12-23 PROCEDURE — 93005 ELECTROCARDIOGRAM TRACING: CPT | Performed by: INTERNAL MEDICINE

## 2021-12-23 PROCEDURE — 80048 BASIC METABOLIC PNL TOTAL CA: CPT | Performed by: INTERNAL MEDICINE

## 2021-12-23 PROCEDURE — 36415 COLL VENOUS BLD VENIPUNCTURE: CPT | Performed by: INTERNAL MEDICINE

## 2021-12-23 PROCEDURE — 93010 ELECTROCARDIOGRAM REPORT: CPT | Performed by: INTERNAL MEDICINE

## 2021-12-23 NOTE — PATIENT INSTRUCTIONS
1. O'K to use aspirin only when travel due to h/o blood clots.    2. Will check EKG and potassium level today    3. Have covid screen 3 days before procedure ( Ask [facility] to arrange).    4. Day of procedure hold Losartan, O'K to take Amlodipine .

## 2021-12-23 NOTE — PROGRESS NOTES
[facility]  [address] [place] [address]  Phone: [phone]  Fax: [phone]  Primary Provider: Kana Maier  Pre-op Performing Provider: KANA MAIER      PREOPERATIVE EVALUATION:  Today's date: 12/23/2021    Ora Og is a 81 year old female who presents for a preoperative evaluation.    Surgical Information:  Surgery/Procedure: UPPER ENDOSCOPY  Surgery Location:[facility]  Surgeon: DR. Jessica  Surgery Date: 01/10/2022  Time of Surgery: TBD  Where patient plans to recover: At home with family  Fax number for surgical facility: no fax needed    Type of Anesthesia Anticipated: Local with MAC    Assessment & Plan     The proposed surgical procedure is considered LOW risk.    Pre-op exam  Patient is medically stable for this low risk procedure.  She will have Covid screening done 3 days prior to procedure and will contact [facility] for orders.  EKG today did not show any acute changes.  We will check electrolytes.  - EKG 12-lead, tracing only  - Basic metabolic panel  (Ca, Cl, CO2, Creat, Gluc, K, Na, BUN)    Smith's esophagus without dysplasia  She is on chronic PPI and denies dysphagia.  Recommended against any alcohol (she drinks alcohol occasionally).  - EKG 12-lead, tracing only  - Basic metabolic panel  (Ca, Cl, CO2, Creat, Gluc, K, Na, BUN)    Primary hypertension  She takes amlodipine 2.5 mg twice a day and losartan 25 in the morning.  Discussed that it is okay to take amlodipine the morning of procedure but hold losartan.  - EKG 12-lead, tracing only  - Basic metabolic panel  (Ca, Cl, CO2, Creat, Gluc, K, Na, BUN)       RECOMMENDATION:  APPROVAL GIVEN to proceed with proposed procedure, without further diagnostic evaluation.        Subjective     HPI related to upcoming procedure:     Ora is a 81 y.o. female with h/o  of mild kidney insufficiency, previous cecal volvulus requiring ileostomy, postoperative DVT and  PE, cholecystectomy, history of rheumatic fever and seasonal allergies, post operative recurrent anemia (which resolved spontaneously), Chris's esophagus, .seasonal allergies.  She is currently here for preprocedure evaluation for routine screening endoscopy to follow-up on her Smith's esophagus    Last endoscopy was 3 years ago.  Since then patient denies any new symptoms, no difficulty swallowing or dysphagia.  She has been on chronic PPI.  She drinks alcohol occasionally.      She has had several previous surgeries including cecal volvulus and ileostomy which was complicated by postop PE and DVT, cholecystectomy, small bowel obstruction and endoscopy 3 years ago.  She denies any bleeding problems, anesthesia problems.  She only had postop DVT and PE once after cecal volvulus when she was extremely ill.  No recurrent clotting events.    No history of heart disease but she does have hypertension.  She takes amlodipine 2.5 mg twice a day and losartan 20 5 in the morning.  She is physically active and denies any chest pains palpitations or dizziness.    No history of heart lung disease, asthma or COPD.  No recent upper respiratory symptoms (no sore throat, runny nose or cough).    Preop Questions 12/19/2021   1. Have you ever had a heart attack or stroke? No   2. Have you ever had surgery on your heart or blood vessels, such as a stent placement, a coronary artery bypass, or surgery on an artery in your head, neck, heart, or legs? No   3. Do you have chest pain with activity? No   4. Do you have a history of  heart failure? No   5. Do you currently have a cold, bronchitis or symptoms of other infection? No   6. Do you have a cough, shortness of breath, or wheezing? No   7. Do you or anyone in your family have previous history of blood clots? YES -    8. Do you or does anyone in your family have a serious bleeding problem such as prolonged bleeding following surgeries or cuts? No   9. Have you ever had problems  with anemia or been told to take iron pills? YES -    10. Have you had any abnormal blood loss such as black, tarry or bloody stools, or abnormal vaginal bleeding? No   11. Have you ever had a blood transfusion? YES -    11a. Have you ever had a transfusion reaction? No   12. Are you willing to have a blood transfusion if it is medically needed before, during, or after your surgery? Yes   13. Have you or any of your relatives ever had problems with anesthesia? No   14. Do you have sleep apnea, excessive snoring or daytime drowsiness? No   15. Do you have any artifical heart valves or other implanted medical devices like a pacemaker, defibrillator, or continuous glucose monitor? No   16. Do you have artificial joints? No   17. Are you allergic to latex? No       Review of Systems  As above, she denies any abdominal pain, constipation or diarrhea.  No urinary symptoms.    Patient Active Problem List    Diagnosis Date Noted     Caregiver stress 09/17/2020     Priority: Medium     Anemia 11/27/2017     Priority: Medium     Smith's esophagus without dysplasia 11/27/2017     Priority: Medium     Essential hypertension, benign      Priority: Medium     Intestinal adhesions with complete obstruction (H) 10/18/2017     Priority: Medium     Added automatically from request for surgery [id number]         HTN (hypertension) 09/24/2014     Priority: Medium     CKD (chronic kidney disease), stage III (H) 09/24/2014     Priority: Medium      Past Medical History:   Diagnosis Date     Allergic rhinitis      Anemia      Aneurysm of aorta (H)     small noted 2012     Cataracts, bilateral      Cecal volvulus (H)      Chronic kidney disease     Chronic stage 3-4     Clotting disorder (H)      DVT (deep venous thrombosis) (H)      Eczema      Gall stones      GERD (gastroesophageal reflux disease)      History of anemia      History of sepsis     postop, & Acute renal failure     Hypertension      Lung nodule     Benign     Osteoporosis       PE (pulmonary embolism) 8/2013    & martha. LE DVT's     Rheumatic fever      Past Surgical History:   Procedure Laterality Date     CHOLECYSTECTOMY  2012     COLON SURGERY  08/2013    for cecal volvulus-hemicolectomy     DILATION AND CURETTAGE      x2     ILEOSTOMY  9/2013     LAPAROTOMY EXPLORATORY N/A 10/19/2017    Procedure: LAPAROTOMY LYSIS OF ADHESIONS;  Surgeon: Terry Luna DO;  Location: [facility];  Service:      DE CLOSE ENTEROSTOMY N/A 9/24/2014    Procedure: TAKEDOWN ILEOSTOMY;  Surgeon: Joseph VILLAGRAN MD;  Location: [facility];  Service: General     Current Outpatient Medications   Medication Sig Dispense Refill     amLODIPine (NORVASC) 2.5 MG tablet TAKE 1 TABLET BY MOUTH 2 TIMES A  tablet 2     calcium carb &cit-vit D3 (CITRACAL + D SLOW RELEASE) 600 mg calcium- 500 unit TbER [CALCIUM CARB &CIT-VIT D3 (CITRACAL + D SLOW RELEASE) 600 MG CALCIUM- 500 UNIT TBER] Take 600 mg by mouth daily.              carboxymethylcellulose (REFRESH PLUS) 0.5 % Dpet ophthalmic dropperette [CARBOXYMETHYLCELLULOSE (REFRESH PLUS) 0.5 % DPET OPHTHALMIC DROPPERETTE]        chlorpheniramine/dextromethorp (ROBITUSSIN COUGH & COLD ORAL) [CHLORPHENIRAMINE/DEXTROMETHORP (ROBITUSSIN COUGH & COLD ORAL)] Take by mouth.       losartan (COZAAR) 25 MG tablet TAKE 1 TABLET BY MOUTH DAILY 90 tablet 3     omeprazole (PRILOSEC) 20 MG DR capsule TAKE 1 CAPSULE ( 20 MG ) BY MOUTH DAILY BEFORE DINNER 30 capsule 11     therapeutic multivitamin (THERAGRAN) tablet [THERAPEUTIC MULTIVITAMIN (THERAGRAN) TABLET] Take 1 tablet by mouth daily.       aspirin 81 MG EC tablet [ASPIRIN 81 MG EC TABLET] Take 81 mg by mouth. Taking every other day.               Allergies   Allergen Reactions     Tree Nuts [Nuts] Hives      pecans, walnuts- tongue itches     Apple [Apple Fruit Extract] Swelling     Lactose Diarrhea     Stomach cramps     Mold [Molds & Smuts] Unknown     Nuts - Unspecified [Nuts] Unknown     SENSITIVITY-PT  REPORTS, peanuts and almonds     Other Environmental Allergy Unknown     Pollen and dust, DUST     Tomato (Solanum Lycopersicum) [Tomato] Unknown     Cold sores     Sulfa (Sulfonamide Antibiotics) [Sulfa Drugs] Rash        Social History     Tobacco Use     Smoking status: Never Smoker     Smokeless tobacco: Never Used   Substance Use Topics     Alcohol use: Yes     Comment: Alcoholic Drinks/day: 1/eve.       History   Drug Use No         Objective     /59 (BP Location: Right arm, Patient Position: Sitting, Cuff Size: Adult Large)   Pulse 84   SpO2 96%     Physical Exam  General: well appearing female, alert and oriented x3  EYES: Eyelids, conjunctiva, and sclera were normal. Pupils were normal.   HEAD, EARS, NOSE, MOUTH, AND THROAT: no cervical LAD, no thyromegaly or nodules appreciated. TMs are visualized and normal, oropharynx is clear.  RESPIRATORY: respirations non labored, CTA bl, no wheezes, rales, no forced expiratory wheezing.  CARDIOVASCULAR: Heart rate and rhythm were normal. No murmurs, rubs,gallops. There was no peripheral edema.   GASTROINTESTINAL: Positive bowel sounds, abdomen is soft, non tender, non distended.     MUSCULOSKELETAL: Muscle mass was normal for age. No joint synovitis or deformity.  LYMPHATIC: There were no enlarged nodes palpable.  SKIN/HAIR/NAILS: Skin color was normal.  No rashes.  NEUROLOGIC: The patient was alert and oriented.  Speech was normal.  There is no facial asymmetry.   PSYCHIATRIC:  Mood and affect were normal.         Recent Labs   Lab Test 10/11/21  1148 09/17/20  1056   HGB 11.7 13.8    174    139   POTASSIUM 4.9 4.7   CR 0.83 0.89        Diagnostics:  Labs pending at this time.  Results will be reviewed when available.   EKG: Normal sinus rhythm, no ST or T changes.    Revised Cardiac Risk Index (RCRI):  The patient has the following serious cardiovascular risks for perioperative complications:   - No serious cardiac risks = 0 points     RCRI  Interpretation: 0 points: Class I (very low risk - 0.4% complication rate)           Signed Electronically by: Jessica Diaz MD  Copy of this evaluation report is provided to requesting physician.

## 2021-12-24 LAB
ATRIAL RATE - MUSE: 87 BPM
DIASTOLIC BLOOD PRESSURE - MUSE: NORMAL MMHG
INTERPRETATION ECG - MUSE: NORMAL
P AXIS - MUSE: 72 DEGREES
PR INTERVAL - MUSE: 120 MS
QRS DURATION - MUSE: 72 MS
QT - MUSE: 352 MS
QTC - MUSE: 423 MS
R AXIS - MUSE: -1 DEGREES
SYSTOLIC BLOOD PRESSURE - MUSE: NORMAL MMHG
T AXIS - MUSE: 20 DEGREES
VENTRICULAR RATE- MUSE: 87 BPM

## 2022-01-06 ENCOUNTER — LAB (OUTPATIENT)
Dept: LAB | Facility: CLINIC | Age: 82
End: 2022-01-06
Attending: INTERNAL MEDICINE
Payer: COMMERCIAL

## 2022-01-06 DIAGNOSIS — Z11.59 ENCOUNTER FOR SCREENING FOR OTHER VIRAL DISEASES: ICD-10-CM

## 2022-01-06 PROCEDURE — U0005 INFEC AGEN DETEC AMPLI PROBE: HCPCS

## 2022-01-06 PROCEDURE — U0003 INFECTIOUS AGENT DETECTION BY NUCLEIC ACID (DNA OR RNA); SEVERE ACUTE RESPIRATORY SYNDROME CORONAVIRUS 2 (SARS-COV-2) (CORONAVIRUS DISEASE [COVID-19]), AMPLIFIED PROBE TECHNIQUE, MAKING USE OF HIGH THROUGHPUT TECHNOLOGIES AS DESCRIBED BY CMS-2020-01-R: HCPCS

## 2022-01-07 LAB — SARS-COV-2 RNA RESP QL NAA+PROBE: NEGATIVE

## 2022-01-10 ENCOUNTER — HOSPITAL ENCOUNTER (OUTPATIENT)
Facility: HOSPITAL | Age: 82
Discharge: HOME | End: 2022-01-10
Attending: INTERNAL MEDICINE | Admitting: INTERNAL MEDICINE
Payer: COMMERCIAL

## 2022-01-10 VITALS
TEMPERATURE: 98.3 F | OXYGEN SATURATION: 96 % | RESPIRATION RATE: 16 BRPM | HEART RATE: 80 BPM | WEIGHT: 125 LBS | HEIGHT: 61 IN | BODY MASS INDEX: 23.6 KG/M2 | SYSTOLIC BLOOD PRESSURE: 124 MMHG | DIASTOLIC BLOOD PRESSURE: 81 MMHG

## 2022-01-10 LAB — UPPER GI ENDOSCOPY: NORMAL

## 2022-01-10 PROCEDURE — G0500 MOD SEDAT ENDO SERVICE >5YRS: HCPCS | Performed by: INTERNAL MEDICINE

## 2022-01-10 PROCEDURE — 250N000011 HC RX IP 250 OP 636: Performed by: INTERNAL MEDICINE

## 2022-01-10 PROCEDURE — 43239 EGD BIOPSY SINGLE/MULTIPLE: CPT | Performed by: INTERNAL MEDICINE

## 2022-01-10 PROCEDURE — 88305 TISSUE EXAM BY PATHOLOGIST: CPT | Mod: TC | Performed by: INTERNAL MEDICINE

## 2022-01-10 PROCEDURE — 250N000009 HC RX 250: Performed by: INTERNAL MEDICINE

## 2022-01-10 PROCEDURE — 43235 EGD DIAGNOSTIC BRUSH WASH: CPT | Performed by: INTERNAL MEDICINE

## 2022-01-10 RX ORDER — NALOXONE HYDROCHLORIDE 0.4 MG/ML
0.4 INJECTION, SOLUTION INTRAMUSCULAR; INTRAVENOUS; SUBCUTANEOUS
Status: CANCELLED | OUTPATIENT
Start: 2022-01-10

## 2022-01-10 RX ORDER — FENTANYL CITRATE 50 UG/ML
INJECTION, SOLUTION INTRAMUSCULAR; INTRAVENOUS PRN
Status: COMPLETED | OUTPATIENT
Start: 2022-01-10 | End: 2022-01-10

## 2022-01-10 RX ORDER — PROCHLORPERAZINE MALEATE 5 MG
5 TABLET ORAL EVERY 6 HOURS PRN
Status: CANCELLED | OUTPATIENT
Start: 2022-01-10

## 2022-01-10 RX ORDER — ONDANSETRON 4 MG/1
4 TABLET, ORALLY DISINTEGRATING ORAL EVERY 6 HOURS PRN
Status: CANCELLED | OUTPATIENT
Start: 2022-01-10

## 2022-01-10 RX ORDER — NALOXONE HYDROCHLORIDE 0.4 MG/ML
0.2 INJECTION, SOLUTION INTRAMUSCULAR; INTRAVENOUS; SUBCUTANEOUS
Status: CANCELLED | OUTPATIENT
Start: 2022-01-10

## 2022-01-10 RX ORDER — ONDANSETRON 2 MG/ML
4 INJECTION INTRAMUSCULAR; INTRAVENOUS EVERY 6 HOURS PRN
Status: CANCELLED | OUTPATIENT
Start: 2022-01-10

## 2022-01-10 RX ORDER — DIPHENHYDRAMINE HYDROCHLORIDE 50 MG/ML
INJECTION INTRAMUSCULAR; INTRAVENOUS PRN
Status: COMPLETED | OUTPATIENT
Start: 2022-01-10 | End: 2022-01-10

## 2022-01-10 RX ADMIN — MIDAZOLAM 0.5 MG: 1 INJECTION INTRAMUSCULAR; INTRAVENOUS at 13:29

## 2022-01-10 RX ADMIN — MIDAZOLAM 0.5 MG: 1 INJECTION INTRAMUSCULAR; INTRAVENOUS at 13:28

## 2022-01-10 RX ADMIN — FENTANYL CITRATE 50 MCG: 50 INJECTION, SOLUTION INTRAMUSCULAR; INTRAVENOUS at 13:33

## 2022-01-10 RX ADMIN — DIPHENHYDRAMINE HYDROCHLORIDE 25 MG: 50 INJECTION, SOLUTION INTRAMUSCULAR; INTRAVENOUS at 13:35

## 2022-01-10 RX ADMIN — BENZOCAINE 2 SPRAY: 220 SPRAY, METERED PERIODONTAL at 13:25

## 2022-01-10 RX ADMIN — FENTANYL CITRATE 1 MCG: 50 INJECTION, SOLUTION INTRAMUSCULAR; INTRAVENOUS at 13:25

## 2022-01-10 RX ADMIN — FENTANYL CITRATE 50 MCG: 50 INJECTION, SOLUTION INTRAMUSCULAR; INTRAVENOUS at 13:25

## 2022-01-10 ASSESSMENT — MIFFLIN-ST. JEOR: SCORE: 974.38

## 2022-01-10 NOTE — H&P
Pre-procedure H and P Note    Reason for procedure: Smith's esophagus.  Patient on PPI once a day.  No history of dysplasia.  No Dysphagia.    History and Physical Reviewed: Reviewed, no changes.    Pre-sedation assessment:    General: alert, appears stated age, and cooperative  Airway: normal  Heart: regular rate and rhythm  Lungs: clear to auscultation bilaterally    Sedation Plan based on assessment: Moderate      ASA Classification: ASA 2 - Patient with mild systemic disease with no functional limitations    Impression: Patient deemed adequate candidate for conscious sedation    Risks, benefits and alternatives were discussed with the patient and daughter and informed consent was obtained.    Plan: esophagogastroduodenoscopy                                                    Rayray Khan MD  Thank you for the opportunity to participate in the care of this patient.   Please feel free to call me with any questions or concerns.  Phone number [phone].

## 2022-01-11 LAB
PATH REPORT.COMMENTS IMP SPEC: NORMAL
PATH REPORT.COMMENTS IMP SPEC: NORMAL
PATH REPORT.FINAL DX SPEC: NORMAL
PATH REPORT.GROSS SPEC: NORMAL
PATH REPORT.MICROSCOPIC SPEC OTHER STN: NORMAL
PATH REPORT.RELEVANT HX SPEC: NORMAL
PHOTO IMAGE: NORMAL

## 2022-01-11 PROCEDURE — 88305 TISSUE EXAM BY PATHOLOGIST: CPT | Mod: 26 | Performed by: PATHOLOGY

## 2022-04-07 ENCOUNTER — IMMUNIZATION (OUTPATIENT)
Dept: NURSING | Facility: CLINIC | Age: 82
End: 2022-04-07
Payer: COMMERCIAL

## 2022-04-07 PROCEDURE — 0054A COVID-19,PF,PFIZER (12+ YRS): CPT

## 2022-04-07 PROCEDURE — 91305 COVID-19,PF,PFIZER (12+ YRS): CPT

## 2022-06-14 DIAGNOSIS — I10 ESSENTIAL HYPERTENSION: ICD-10-CM

## 2022-06-15 RX ORDER — AMLODIPINE BESYLATE 2.5 MG/1
TABLET ORAL
Qty: 180 TABLET | Refills: 1 | Status: SHIPPED | OUTPATIENT
Start: 2022-06-15 | End: 2022-10-26

## 2022-06-15 NOTE — TELEPHONE ENCOUNTER
"Last Written Prescription Date:  9/10/2021  Last Fill Quantity: 180,  # refills: 2   Last office visit provider:  12/23/2021     Requested Prescriptions   Pending Prescriptions Disp Refills     amLODIPine (NORVASC) 2.5 MG tablet [Pharmacy Med Name: AMLODIPINE BESYLATE 2.5 MG 2.5 Tablet] 180 tablet 2     Sig: TAKE 1 TABLET BY MOUTH 2 TIMES A DAY       Calcium Channel Blockers Protocol  Passed - 6/15/2022  8:54 AM        Passed - Blood pressure under 140/90 in past 12 months     BP Readings from Last 3 Encounters:   01/10/22 124/81   12/23/21 115/59   10/11/21 130/70                 Passed - Recent (12 mo) or future (30 days) visit within the authorizing provider's specialty     Patient has had an office visit with the authorizing provider or a provider within the authorizing providers department within the previous 12 mos or has a future within next 30 days. See \"Patient Info\" tab in inbasket, or \"Choose Columns\" in Meds & Orders section of the refill encounter.              Passed - Medication is active on med list        Passed - Patient is age 18 or older        Passed - No active pregnancy on record        Passed - Normal serum creatinine on file in past 12 months     Recent Labs   Lab Test 12/23/21  1339   CR 0.97       Ok to refill medication if creatinine is low          Passed - No positive pregnancy test in past 12 months             Gemma Bansal RN 06/15/22 8:54 AM  "

## 2022-10-02 ENCOUNTER — HEALTH MAINTENANCE LETTER (OUTPATIENT)
Age: 82
End: 2022-10-02

## 2022-10-24 ASSESSMENT — ENCOUNTER SYMPTOMS
FREQUENCY: 0
SHORTNESS OF BREATH: 0
SORE THROAT: 0
PARESTHESIAS: 0
HEARTBURN: 0
DIZZINESS: 0
HEMATOCHEZIA: 0
ABDOMINAL PAIN: 0
DIARRHEA: 0
MYALGIAS: 1
CHILLS: 0
DYSURIA: 0
HEADACHES: 0
NERVOUS/ANXIOUS: 1
COUGH: 0
JOINT SWELLING: 0
ARTHRALGIAS: 0
CONSTIPATION: 0
EYE PAIN: 0
BREAST MASS: 0
FEVER: 0
PALPITATIONS: 0
NAUSEA: 0
HEMATURIA: 0
WEAKNESS: 0

## 2022-10-24 ASSESSMENT — ACTIVITIES OF DAILY LIVING (ADL): CURRENT_FUNCTION: NO ASSISTANCE NEEDED

## 2022-10-26 ENCOUNTER — OFFICE VISIT (OUTPATIENT)
Dept: INTERNAL MEDICINE | Facility: CLINIC | Age: 82
End: 2022-10-26
Payer: COMMERCIAL

## 2022-10-26 VITALS
DIASTOLIC BLOOD PRESSURE: 80 MMHG | HEIGHT: 61 IN | HEART RATE: 88 BPM | WEIGHT: 122.5 LBS | BODY MASS INDEX: 23.13 KG/M2 | OXYGEN SATURATION: 100 % | SYSTOLIC BLOOD PRESSURE: 135 MMHG | TEMPERATURE: 98.4 F | RESPIRATION RATE: 20 BRPM

## 2022-10-26 DIAGNOSIS — Z63.6 CAREGIVER STRESS: ICD-10-CM

## 2022-10-26 DIAGNOSIS — R73.09 ABNORMAL GLUCOSE: ICD-10-CM

## 2022-10-26 DIAGNOSIS — Z00.00 ENCOUNTER FOR MEDICARE ANNUAL WELLNESS EXAM: Primary | ICD-10-CM

## 2022-10-26 DIAGNOSIS — K21.00 GASTROESOPHAGEAL REFLUX DISEASE WITH ESOPHAGITIS WITHOUT HEMORRHAGE: ICD-10-CM

## 2022-10-26 DIAGNOSIS — K22.70 BARRETT'S ESOPHAGUS WITHOUT DYSPLASIA: ICD-10-CM

## 2022-10-26 DIAGNOSIS — I10 ESSENTIAL HYPERTENSION: ICD-10-CM

## 2022-10-26 DIAGNOSIS — E55.9 VITAMIN D DEFICIENCY: ICD-10-CM

## 2022-10-26 LAB
ALBUMIN SERPL BCG-MCNC: 4.5 G/DL (ref 3.5–5.2)
ALP SERPL-CCNC: 64 U/L (ref 35–104)
ALT SERPL W P-5'-P-CCNC: 17 U/L (ref 10–35)
ANION GAP SERPL CALCULATED.3IONS-SCNC: 14 MMOL/L (ref 7–15)
AST SERPL W P-5'-P-CCNC: 30 U/L (ref 10–35)
BASOPHILS # BLD AUTO: 0 10E3/UL (ref 0–0.2)
BASOPHILS NFR BLD AUTO: 1 %
BILIRUB SERPL-MCNC: 0.6 MG/DL
BUN SERPL-MCNC: 18.8 MG/DL (ref 8–23)
CALCIUM SERPL-MCNC: 10.5 MG/DL (ref 8.8–10.2)
CHLORIDE SERPL-SCNC: 100 MMOL/L (ref 98–107)
CHOLEST SERPL-MCNC: 187 MG/DL
CREAT SERPL-MCNC: 0.89 MG/DL (ref 0.51–0.95)
DEPRECATED CALCIDIOL+CALCIFEROL SERPL-MC: 47 UG/L (ref 20–75)
DEPRECATED HCO3 PLAS-SCNC: 24 MMOL/L (ref 22–29)
EOSINOPHIL # BLD AUTO: 0.2 10E3/UL (ref 0–0.7)
EOSINOPHIL NFR BLD AUTO: 3 %
ERYTHROCYTE [DISTWIDTH] IN BLOOD BY AUTOMATED COUNT: 13.5 % (ref 10–15)
GFR SERPL CREATININE-BSD FRML MDRD: 65 ML/MIN/1.73M2
GLUCOSE SERPL-MCNC: 106 MG/DL (ref 70–99)
HBA1C MFR BLD: 5.2 % (ref 0–5.6)
HCT VFR BLD AUTO: 40.6 % (ref 35–47)
HDLC SERPL-MCNC: 94 MG/DL
HGB BLD-MCNC: 13 G/DL (ref 11.7–15.7)
IMM GRANULOCYTES # BLD: 0 10E3/UL
IMM GRANULOCYTES NFR BLD: 0 %
LDLC SERPL CALC-MCNC: 80 MG/DL
LYMPHOCYTES # BLD AUTO: 1.3 10E3/UL (ref 0.8–5.3)
LYMPHOCYTES NFR BLD AUTO: 22 %
MCH RBC QN AUTO: 30.4 PG (ref 26.5–33)
MCHC RBC AUTO-ENTMCNC: 32 G/DL (ref 31.5–36.5)
MCV RBC AUTO: 95 FL (ref 78–100)
MONOCYTES # BLD AUTO: 0.5 10E3/UL (ref 0–1.3)
MONOCYTES NFR BLD AUTO: 8 %
NEUTROPHILS # BLD AUTO: 4 10E3/UL (ref 1.6–8.3)
NEUTROPHILS NFR BLD AUTO: 67 %
NONHDLC SERPL-MCNC: 93 MG/DL
PLATELET # BLD AUTO: 201 10E3/UL (ref 150–450)
POTASSIUM SERPL-SCNC: 4.2 MMOL/L (ref 3.4–5.3)
PROT SERPL-MCNC: 7.2 G/DL (ref 6.4–8.3)
RBC # BLD AUTO: 4.28 10E6/UL (ref 3.8–5.2)
SODIUM SERPL-SCNC: 138 MMOL/L (ref 136–145)
TRIGL SERPL-MCNC: 67 MG/DL
TSH SERPL DL<=0.005 MIU/L-ACNC: 1.45 UIU/ML (ref 0.3–4.2)
WBC # BLD AUTO: 5.9 10E3/UL (ref 4–11)

## 2022-10-26 PROCEDURE — 82306 VITAMIN D 25 HYDROXY: CPT | Performed by: INTERNAL MEDICINE

## 2022-10-26 PROCEDURE — 80061 LIPID PANEL: CPT | Performed by: INTERNAL MEDICINE

## 2022-10-26 PROCEDURE — G0439 PPPS, SUBSEQ VISIT: HCPCS | Performed by: INTERNAL MEDICINE

## 2022-10-26 PROCEDURE — 36415 COLL VENOUS BLD VENIPUNCTURE: CPT | Performed by: INTERNAL MEDICINE

## 2022-10-26 PROCEDURE — 84443 ASSAY THYROID STIM HORMONE: CPT | Performed by: INTERNAL MEDICINE

## 2022-10-26 PROCEDURE — 85025 COMPLETE CBC W/AUTO DIFF WBC: CPT | Performed by: INTERNAL MEDICINE

## 2022-10-26 PROCEDURE — 99213 OFFICE O/P EST LOW 20 MIN: CPT | Mod: 25 | Performed by: INTERNAL MEDICINE

## 2022-10-26 PROCEDURE — 80053 COMPREHEN METABOLIC PANEL: CPT | Performed by: INTERNAL MEDICINE

## 2022-10-26 PROCEDURE — 83036 HEMOGLOBIN GLYCOSYLATED A1C: CPT | Performed by: INTERNAL MEDICINE

## 2022-10-26 RX ORDER — AMLODIPINE BESYLATE 2.5 MG/1
2.5 TABLET ORAL 2 TIMES DAILY
Qty: 180 TABLET | Refills: 3 | Status: SHIPPED | OUTPATIENT
Start: 2022-10-26 | End: 2023-11-30

## 2022-10-26 RX ORDER — LOSARTAN POTASSIUM 25 MG/1
25 TABLET ORAL DAILY
Qty: 90 TABLET | Refills: 3 | Status: SHIPPED | OUTPATIENT
Start: 2022-10-26 | End: 2023-11-17

## 2022-10-26 SDOH — SOCIAL STABILITY - SOCIAL INSECURITY: DEPENDENT RELATIVE NEEDING CARE AT HOME: Z63.6

## 2022-10-26 ASSESSMENT — ENCOUNTER SYMPTOMS
HEARTBURN: 0
JOINT SWELLING: 0
DYSURIA: 0
CONSTIPATION: 0
DIZZINESS: 0
HEMATOCHEZIA: 0
FREQUENCY: 0
DIARRHEA: 0
CHILLS: 0
COUGH: 0
NERVOUS/ANXIOUS: 1
PARESTHESIAS: 0
MYALGIAS: 1
SORE THROAT: 0
EYE PAIN: 0
ARTHRALGIAS: 0
WEAKNESS: 0
NAUSEA: 0
SHORTNESS OF BREATH: 0
PALPITATIONS: 0
BREAST MASS: 0
ABDOMINAL PAIN: 0
HEADACHES: 0
HEMATURIA: 0
FEVER: 0

## 2022-10-26 ASSESSMENT — PAIN SCALES - GENERAL: PAINLEVEL: NO PAIN (0)

## 2022-10-26 ASSESSMENT — ACTIVITIES OF DAILY LIVING (ADL): CURRENT_FUNCTION: NO ASSISTANCE NEEDED

## 2022-10-26 NOTE — PATIENT INSTRUCTIONS
Get new shingles vaccine at the pharmacy ( 2 shots , 2-6 months apart)    2. Labs today.    3. Monitor b/p at home.      Patient Education   Personalized Prevention Plan  You are due for the preventive services outlined below.  Your care team is available to assist you in scheduling these services.  If you have already completed any of these items, please share that information with your care team to update in your medical record.  Health Maintenance Due   Topic Date Due    Kidney Microalbumin Urine Test  Never done    Hepatitis B Vaccine (1 of 3 - 3-dose series) Never done    Urine Test  Never done    Zoster (Shingles) Vaccine (2 of 3) 05/02/2017    Flu Vaccine (1) 09/01/2022    Cholesterol Lab  10/11/2022    ANNUAL REVIEW OF HM ORDERS  10/11/2022    Hemoglobin  10/11/2022       Signs of Hearing Loss      Hearing much better with one ear can be a sign of hearing loss.   Hearing loss is a problem shared by many people. In fact, it is one of the most common health problems, particularly as people age. Most people age 65 and older have some hearing loss. By age 80, almost everyone does. Hearing loss often occurs slowly over the years. So you may not realize your hearing has gotten worse.  Have your hearing checked  Call your healthcare provider if you:  Have to strain to hear normal conversation  Have to watch other people s faces very carefully to follow what they re saying  Need to ask people to repeat what they ve said  Often misunderstand what people are saying  Turn the volume of the television or radio up so high that others complain  Feel that people are mumbling when they re talking to you  Find that the effort to hear leaves you feeling tired and irritated  Notice, when using the phone, that you hear better with one ear than the other  [employer] last reviewed this educational content on 1/1/2020 2000-2021 [employer]. All rights reserved. This information is not intended as a substitute for  professional medical care. Always follow your healthcare professional's instructions.

## 2022-10-26 NOTE — PROGRESS NOTES
SUBJECTIVE:   Ora is a 82 year old who presents for Preventive Visit.    Ora is a 82 y.o. female with h/o  of mild kidney insufficiency, previous cecal volvulus requiring ileostomy, postoperative DVT and PE, cholecystectomy, history of rheumatic fever and seasonal allergies, post operative recurrent anemia (which resolved spontaneously), Chris's esophagus, .seasonal allergies.  He is currently here for wellness exam.    She denies any concerns or complaints.  She does have a caregiver stress: Her  has Parkinson's disease and she is primary caretaker.  She also has a daughter who lives in the group home and has special needs and has had some behavioral issues recently.  Lately she was able however to join's Parkinson support group which was a positive thing.  She also has a total of 15 cans who all live in the area except for to running available to help her if needed.    She did fell once this year when she tripped on the sidewalk in May and had right black eye but denies any persistent headaches or concussive symptoms.  No recurrent falls.    For ophthalmology she does see Dr. Bragg at [place] [facility] and is up-to-date on her eye exam.  She wears glasses.    She sleeps well and denies depression.    She wears hearing aids and hearing is good.    For hypertension she takes losartan in the morning and amlodipine twice a day.  Blood pressure at home is usually in 120s.    In January she had endoscopy done to follow-up on her Smith's esophagus.  She has short segment Smith's esophagus, biopsy was negative for 2.,  3-year follow-up was recommended.    Patient has been advised of split billing requirements and indicates understanding: Yes     Are you in the first 12 months of your Medicare coverage?  No    Healthy Habits:     In general, how would you rate your overall health?  Excellent    Frequency of exercise:  6-7 days/week    Duration of exercise:  30-45 minutes    Do you usually eat at least 4  "servings of fruit and vegetables a day, include whole grains    & fiber and avoid regularly eating high fat or \"junk\" foods?  Yes    Taking medications regularly:  Yes    Medication side effects:  None    Ability to successfully perform activities of daily living:  No assistance needed    Home Safety:  No safety concerns identified    Hearing Impairment:  Difficulty following a conversation in a noisy restaurant or crowded room, feel that people are mumbling or not speaking clearly, difficulty following dialogue in the theater, difficult to understand a speaker at a public meeting or [employer] service, need to ask people to speak up or repeat themselves, find that men's voices are easier to understand than woman's, difficulty understanding soft or whispered speech and difficulty understanding speech on the telephone    In the past 6 months, have you been bothered by leaking of urine?  No    In general, how would you rate your overall mental or emotional health?  Good      PHQ-2 Total Score: 1    Additional concerns today:  No    Do you feel safe in your environment? Yes    Have you ever done Advance Care Planning? (For example, a Health Directive, POLST, or a discussion with a medical provider or your loved ones about your wishes): Yes, patient states has an Advance Care Planning document and will bring a copy to the clinic.       Fall risk  Fallen 2 or more times in the past year?: No  Any fall with injury in the past year?: No  click delete button to remove this line now  Cognitive Screening   1) Repeat 3 items (version 5: captain, garden, picture)    2) Clock draw: NORMAL  3) 3 item recall: Recalls 3 objects  Results: 3 items recalled: COGNITIVE IMPAIRMENT LESS LIKELY    Mini-CogTM Copyright NAZIA Matthews. Licensed by the author for use in [facility]; reprinted with permission (erin@.[facility]). All rights reserved.      Do you have sleep apnea, excessive snoring or daytime drowsiness?: no    Reviewed and " updated as needed this visit by clinical staff   Tobacco  Allergies  Meds              Reviewed and updated as needed this visit by Provider                 Social History     Tobacco Use     Smoking status: Never     Smokeless tobacco: Never   Substance Use Topics     Alcohol use: Yes     Comment: Alcoholic Drinks/day: 1/diego.     If you drink alcohol do you typically have >3 drinks per day or >7 drinks per week? No    Alcohol Use 10/24/2022   Prescreen: >3 drinks/day or >7 drinks/week? No       Current providers sharing in care for this patient include:     Patient Care Team:  Jessica Diaz MD as PCP - General  Alyx Baumann MD as MD (Hematology & Oncology)  Jessica Diaz MD as Assigned PCP   [facility], [address]     The following health maintenance items are reviewed in Epic and correct as of today:  Health Maintenance   Topic Date Due     MICROALBUMIN  Never done     HEPATITIS B IMMUNIZATION (1 of 3 - 3-dose series) Never done     URINALYSIS  Never done     ZOSTER IMMUNIZATION (2 of 3) 05/02/2017     INFLUENZA VACCINE (1) 09/01/2022     LIPID  10/11/2022     ANNUAL REVIEW OF HM ORDERS  10/11/2022     MEDICARE ANNUAL WELLNESS VISIT  10/11/2022     HEMOGLOBIN  10/11/2022     BMP  12/23/2022     DEXA  09/28/2023     FALL RISK ASSESSMENT  10/26/2023     DTAP/TDAP/TD IMMUNIZATION (2 - Td or Tdap) 09/20/2026     ADVANCE CARE PLANNING  10/11/2026     PHQ-2 (once per calendar year)  Completed     Pneumococcal Vaccine: 65+ Years  Completed     COVID-19 Vaccine  Completed     IPV IMMUNIZATION  Aged Out     MENINGITIS IMMUNIZATION  Aged Out       Pertinent mammograms are reviewed under the imaging tab.    Review of Systems   Constitutional: Negative for chills and fever.   HENT: Positive for hearing loss. Negative for congestion, ear pain and sore throat.    Eyes: Negative for pain and visual disturbance.   Respiratory: Negative for cough and shortness of breath.    Cardiovascular:  "Negative for chest pain, palpitations and peripheral edema.   Gastrointestinal: Negative for abdominal pain, constipation, diarrhea, heartburn, hematochezia and nausea.   Breasts:  Negative for tenderness, breast mass and discharge.   Genitourinary: Negative for dysuria, frequency, genital sores, hematuria, pelvic pain, urgency, vaginal bleeding and vaginal discharge.   Musculoskeletal: Positive for myalgias. Negative for arthralgias and joint swelling.   Skin: Negative for rash.   Neurological: Negative for dizziness, weakness, headaches and paresthesias.   Psychiatric/Behavioral: Negative for mood changes. The patient is nervous/anxious.      No abdominal pain, constipation diarrhea or blood in the stool.  No vaginal spotting or bleeding, no urinary complaints today.    OBJECTIVE:   /80   Pulse 88   Temp 98.4  F (36.9  C) (Oral)   Resp 20   Ht 1.549 m (5' 1\")   Wt 55.6 kg (122 lb 8 oz)   LMP  (LMP Unknown)   SpO2 100%   BMI 23.15 kg/m   Estimated body mass index is 23.15 kg/m  as calculated from the following:    Height as of this encounter: 1.549 m (5' 1\").    Weight as of this encounter: 55.6 kg (122 lb 8 oz).  Physical Exam  General: well appearing female, alert and oriented x3  EYES: Eyelids, conjunctiva, and sclera were normal. Pupils were normal.   HEAD, EARS, NOSE, MOUTH, AND THROAT: no cervical LAD, no thyromegaly or nodules appreciated. TMs are visualized and normal, oropharynx is clear.  RESPIRATORY: respirations non labored, CTA bl, no wheezes, rales, no forced expiratory wheezing.  CARDIOVASCULAR: Heart rate and rhythm were normal. No murmurs, rubs,gallops. There was no peripheral edema.  GASTROINTESTINAL: Positive bowel sounds, abdomen is soft, non tender, non distended.     MUSCULOSKELETAL: Muscle mass was normal for age. No joint synovitis or deformity.  LYMPHATIC: There were no enlarged nodes palpable.  SKIN/HAIR/NAILS: Skin color was normal.  No rashes.  NEUROLOGIC: The patient was " alert and oriented.  Speech was normal.  There is no facial asymmetry.   PSYCHIATRIC:  Mood and affect were normal.  Mild anxiety noted.  Breast exam: No axilla lymphadenopathy, breast masses or skin changes appreciated.    Extremities: She does have severe  Varicose veins and wears compression stockings.      ASSESSMENT / PLAN:   Ora was seen today for wellness visit.    Diagnoses and all orders for this visit:    Encounter for Medicare annual wellness exam  Preventative care discussed.  She will get shingles vaccine at the pharmacy, she is up-to-date on everything else.  We will check fasting blood work today  -     CBC with platelets and differential    Vitamin D deficiency  -     Vitamin D Deficiency    Smith's esophagus without dysplasia  Continue daily PPI.  She had endoscopy in January of this year which did not show any 18..  3-year follow-up was recommended  -     omeprazole (PRILOSEC) 20 MG DR capsule; TAKE 1 CAPSULE ( 20 MG ) BY MOUTH DAILY BEFORE DINNER    Caregiver stress   has Parkinson's and she is his primary caregiver.  She does have a lot of children in the area he can help out, they also signed up for Parkinson's support group.    Gastroesophageal reflux disease with esophagitis without hemorrhage  -     CBC with platelets and differential; Future  -     omeprazole (PRILOSEC) 20 MG DR capsule; TAKE 1 CAPSULE ( 20 MG ) BY MOUTH DAILY BEFORE DINNER  -     CBC with platelets and differential    Essential hypertension  She will monitor blood pressure at home, initially it was slightly elevated but repeat was improved.  At home is usually 120s.  -     losartan (COZAAR) 25 MG tablet; Take 1 tablet (25 mg) by mouth daily  -     amLODIPine (NORVASC) 2.5 MG tablet; Take 1 tablet (2.5 mg) by mouth 2 times daily  -     Lipid panel reflex to direct LDL Non-fasting  -     CBC with platelets and differential  -     Comprehensive metabolic panel    Other orders  -     TSH with free T4  "reflex          Estimated body mass index is 23.15 kg/m  as calculated from the following:    Height as of this encounter: 1.549 m (5' 1\").    Weight as of this encounter: 55.6 kg (122 lb 8 oz).    She reports that she has never smoked. She has never used smokeless tobacco.      Appropriate preventive services were discussed with this patient, including applicable screening as appropriate for cardiovascular disease, diabetes, osteopenia/osteoporosis, and glaucoma.  As appropriate for age/gender, discussed screening for colorectal cancer, prostate cancer, breast cancer, and cervical cancer. Checklist reviewing preventive services available has been given to the patient.    Reviewed patients plan of care and provided an AVS. The Basic Care Plan (routine screening as documented in Health Maintenance) for Ora meets the Care Plan requirement. This Care Plan has been established and reviewed with the Patient.    Jessica Diaz MD  [facility]    Identified Health Risks:  "

## 2023-07-21 ENCOUNTER — OFFICE VISIT (OUTPATIENT)
Dept: INTERNAL MEDICINE | Facility: CLINIC | Age: 83
End: 2023-07-21
Payer: COMMERCIAL

## 2023-07-21 VITALS
TEMPERATURE: 99 F | BODY MASS INDEX: 23.32 KG/M2 | WEIGHT: 123.5 LBS | RESPIRATION RATE: 16 BRPM | HEART RATE: 67 BPM | DIASTOLIC BLOOD PRESSURE: 76 MMHG | HEIGHT: 61 IN | SYSTOLIC BLOOD PRESSURE: 108 MMHG

## 2023-07-21 DIAGNOSIS — H61.22 IMPACTED CERUMEN OF LEFT EAR: ICD-10-CM

## 2023-07-21 DIAGNOSIS — R06.02 SHORTNESS OF BREATH: Primary | ICD-10-CM

## 2023-07-21 LAB
ANION GAP SERPL CALCULATED.3IONS-SCNC: 11 MMOL/L (ref 7–15)
ATRIAL RATE - MUSE: 83 BPM
BASOPHILS # BLD AUTO: 0 10E3/UL (ref 0–0.2)
BASOPHILS NFR BLD AUTO: 1 %
BUN SERPL-MCNC: 28.5 MG/DL (ref 8–23)
CALCIUM SERPL-MCNC: 9.9 MG/DL (ref 8.8–10.2)
CHLORIDE SERPL-SCNC: 103 MMOL/L (ref 98–107)
CREAT SERPL-MCNC: 1 MG/DL (ref 0.51–0.95)
D DIMER PPP FEU-MCNC: 2.16 UG/ML FEU (ref 0–0.5)
DEPRECATED HCO3 PLAS-SCNC: 27 MMOL/L (ref 22–29)
DIASTOLIC BLOOD PRESSURE - MUSE: NORMAL MMHG
EOSINOPHIL # BLD AUTO: 0.2 10E3/UL (ref 0–0.7)
EOSINOPHIL NFR BLD AUTO: 4 %
ERYTHROCYTE [DISTWIDTH] IN BLOOD BY AUTOMATED COUNT: 13.5 % (ref 10–15)
GFR SERPL CREATININE-BSD FRML MDRD: 56 ML/MIN/1.73M2
GLUCOSE SERPL-MCNC: 118 MG/DL (ref 70–99)
HCT VFR BLD AUTO: 38.9 % (ref 35–47)
HGB BLD-MCNC: 12.7 G/DL (ref 11.7–15.7)
IMM GRANULOCYTES # BLD: 0 10E3/UL
IMM GRANULOCYTES NFR BLD: 0 %
INTERPRETATION ECG - MUSE: NORMAL
LYMPHOCYTES # BLD AUTO: 1.1 10E3/UL (ref 0.8–5.3)
LYMPHOCYTES NFR BLD AUTO: 23 %
MCH RBC QN AUTO: 30.5 PG (ref 26.5–33)
MCHC RBC AUTO-ENTMCNC: 32.6 G/DL (ref 31.5–36.5)
MCV RBC AUTO: 94 FL (ref 78–100)
MONOCYTES # BLD AUTO: 0.4 10E3/UL (ref 0–1.3)
MONOCYTES NFR BLD AUTO: 8 %
NEUTROPHILS # BLD AUTO: 3.1 10E3/UL (ref 1.6–8.3)
NEUTROPHILS NFR BLD AUTO: 64 %
NT-PROBNP SERPL-MCNC: 395 PG/ML (ref 0–1800)
P AXIS - MUSE: 71 DEGREES
PLATELET # BLD AUTO: 208 10E3/UL (ref 150–450)
POTASSIUM SERPL-SCNC: 4.5 MMOL/L (ref 3.4–5.3)
PR INTERVAL - MUSE: 118 MS
QRS DURATION - MUSE: 74 MS
QT - MUSE: 354 MS
QTC - MUSE: 415 MS
R AXIS - MUSE: -25 DEGREES
RBC # BLD AUTO: 4.16 10E6/UL (ref 3.8–5.2)
SODIUM SERPL-SCNC: 141 MMOL/L (ref 136–145)
SYSTOLIC BLOOD PRESSURE - MUSE: NORMAL MMHG
T AXIS - MUSE: 42 DEGREES
VENTRICULAR RATE- MUSE: 83 BPM
WBC # BLD AUTO: 4.9 10E3/UL (ref 4–11)

## 2023-07-21 PROCEDURE — 99207 EKG 12-LEAD, TRACING ONLY: CPT | Performed by: INTERNAL MEDICINE

## 2023-07-21 PROCEDURE — 36415 COLL VENOUS BLD VENIPUNCTURE: CPT | Performed by: INTERNAL MEDICINE

## 2023-07-21 PROCEDURE — 83880 ASSAY OF NATRIURETIC PEPTIDE: CPT | Performed by: INTERNAL MEDICINE

## 2023-07-21 PROCEDURE — 80048 BASIC METABOLIC PNL TOTAL CA: CPT | Performed by: INTERNAL MEDICINE

## 2023-07-21 PROCEDURE — 93010 ELECTROCARDIOGRAM REPORT: CPT | Performed by: INTERNAL MEDICINE

## 2023-07-21 PROCEDURE — 85025 COMPLETE CBC W/AUTO DIFF WBC: CPT | Performed by: INTERNAL MEDICINE

## 2023-07-21 PROCEDURE — 99214 OFFICE O/P EST MOD 30 MIN: CPT | Mod: 25 | Performed by: INTERNAL MEDICINE

## 2023-07-21 PROCEDURE — 69209 REMOVE IMPACTED EAR WAX UNI: CPT | Mod: LT | Performed by: INTERNAL MEDICINE

## 2023-07-21 PROCEDURE — 85379 FIBRIN DEGRADATION QUANT: CPT | Performed by: INTERNAL MEDICINE

## 2023-07-21 RX ORDER — ALBUTEROL SULFATE 90 UG/1
2 AEROSOL, METERED RESPIRATORY (INHALATION) EVERY 6 HOURS PRN
Qty: 18 G | Refills: 3 | Status: SHIPPED | OUTPATIENT
Start: 2023-07-21 | End: 2023-11-02

## 2023-07-21 ASSESSMENT — ENCOUNTER SYMPTOMS: WHEEZING: 1

## 2023-07-21 NOTE — PROGRESS NOTES
Assessment & Plan     Shortness of breath  Currently differential is broad since there is no significant finding on exam.  Certainly she could have allergies and mild asthmatic symptoms triggered by air quality but there is no wheezing or bronchospasm on exam today.  We will check blood work to rule out anemia.  Cardiac issues can be contributing but currently there are no murmurs or evidence of CHF.  We will check BNP.  EKG today was normal.  Due to prior history of DVT and PE we will check D-dimer.    Since symptoms are improving, she will try albuterol as needed but if symptoms start to worsen she will let me know.  Neck step would be to order echocardiogram.    - EKG 12-lead, tracing only  -- albuterol (PROAIR HFA/PROVENTIL HFA/VENTOLIN HFA) 108 (90 Base) MCG/ACT inhaler; Inhale 2 puffs into the lungs every 6 hours as needed for shortness of breath, wheezing or cough  - D dimer, quantitative; Future  - EKG 12-lead, tracing only  - CBC with platelets and differential  - BNP-N terminal pro  - Basic metabolic panel  (Ca, Cl, CO2, Creat, Gluc, K, Na, BUN)  - D dimer, quantitative    Impacted cerumen of left ear  - REMOVE IMPACTED CERUMEN    Jessica Diaz MD  [facility]    Subjective   Ora is a 82 year old, presenting for the following health issues:  Allergies (SOB, light cough, sneezing)        7/21/2023     1:38 PM   Additional Questions   Roomed by Christina   Accompanied by N/A         7/21/2023     1:39 PM   Patient Reported Additional Medications   Patient reports taking the following new medications switched to Visine Allergy instead of Refresh     Ora is a 82 y.o. female with h/o  of mild kidney insufficiency, previous cecal volvulus requiring ileostomy, postoperative DVT and PE, cholecystectomy, history of rheumatic fever and seasonal allergies, post operative recurrent anemia (which resolved spontaneously), Chris's esophagus, .seasonal allergies.  She is currently here for  acute visit due to increase in shortness of breath.    General the patient does not have any breathing issues but most recently was worsened with air quality has not noticed increased shortness of breath with exertion for the last 3 weeks.  She does have seasonal allergies usually with symptoms of sneezing and itchy eyes but no history of asthma.  Shortness of breath is worse with walking and going up and down the steps.  Because small exertion was started causing her symptoms she decided to get checked out.  In the last 3 weeks since area has cleared her symptoms are slightly better.  She denies any nighttime breathing issues or coughing currently denies any coughing or chest tightness, no palpitations or chest pains or lower extremity edema.    She did have history of postop DVT and PE.  Currently denies any pleurisy or calf tenderness.    No sinus congestion or postnasal drainage.  Vital signs today are good, his ambulation her oxygen remained at 93% and heart rate was 107.    She continues to have a lot of stress at home since she is a caregiver for her 's Parkinson's.    She does have hiatal hernia and acid reflux but no symptoms of exacerbations or burning.    Allergies    History of Present Illness       Reason for visit:  Shortness of breath due to allergies, humidity, poor air quality  Symptom onset:  1-2 weeks ago  Symptoms include:  Shortness of breath  Symptom intensity:  Moderate  Symptom progression:  Staying the same  Had these symptoms before:  Yes  Has tried/received treatment for these symptoms:  Yes  Previous treatment was successful:  Yes  What makes it worse:  Humidity and heat, bad air  What makes it better:  Staying inside with air conditioning    She eats 2-3 servings of fruits and vegetables daily.She consumes 1 sweetened beverage(s) daily.She exercises with enough effort to increase her heart rate 20 to 29 minutes per day.  She exercises with enough effort to increase her heart rate  "7 days per week.   She is taking medications regularly.       Review of Systems   Respiratory: Positive for wheezing.           Objective    /76 (BP Location: Left arm, Patient Position: Sitting, Cuff Size: Adult Regular)   Pulse 67   Temp 99  F (37.2  C) (Tympanic)   Resp 16   Ht 1.549 m (5' 1\")   Wt 56 kg (123 lb 8 oz)   LMP 07/21/1992 (Approximate)   Breastfeeding No   BMI 23.34 kg/m    Body mass index is 23.34 kg/m .  Physical Exam   General: well appearing female, alert and oriented x3  EYES: Eyelids, conjunctiva, and sclera were normal. Pupils were normal.   HEAD, EARS, NOSE, MOUTH, AND THROAT: no cervical LAD, no thyromegaly or nodules appreciated. TMs are visualized and normal, oropharynx is clear.  RESPIRATORY: respirations non labored, CTA bl, no wheezes, rales, no forced expiratory wheezing.  Mild mucus impaction of bronchitis when she coughs or bronchospasm.  CARDIOVASCULAR: Heart rate and rhythm were normal. No murmurs, rubs,gallops. There was no peripheral edema. No carotid bruits.  No calf tenderness.  GASTROINTESTINAL: Positive bowel sounds, abdomen is soft, non tender, non distended.     MUSCULOSKELETAL: Muscle mass was normal for age. No joint synovitis or deformity.  LYMPHATIC: There were no enlarged nodes palpable.  SKIN/HAIR/NAILS: Skin color was normal.  No rashes.  NEUROLOGIC: The patient was alert and oriented.  Speech was normal.  There is no facial asymmetry.   PSYCHIATRIC:  Mood and affect were normal.           "

## 2023-07-21 NOTE — PATIENT INSTRUCTIONS
Shortness of breath:  Can be heart or lung related.  Lung and heart sounds good on exam, VS are normal, no allergy symptoms.  Will check labs, EKG, clotting factor.     Try albuterol as needed.    If symptoms are getting worse let me know.

## 2023-07-21 NOTE — NURSING NOTE
EKG done after appointment.     Ear Wash  Patient identified using two patient identifiers.  Ear exam showing wax occlusion completed by RN and MD prior to ear irrigation.  Solution: warm water was placed in the left ear via irrigation tool: rhinoceros ear.      RN ear assessment completed prior to ear irrigation.   Otoscopic exam reveals cerumen present and irrigation advised by MD.   Post Ear Irrigation exam completed and cerumen plug removed.      Pain assessment completed. No discomfort or pain reported. No trauma to external ear canal    Patient tolerated procedure:  yes

## 2023-07-23 ENCOUNTER — MYC MEDICAL ADVICE (OUTPATIENT)
Dept: INTERNAL MEDICINE | Facility: CLINIC | Age: 83
End: 2023-07-23
Payer: COMMERCIAL

## 2023-07-23 ENCOUNTER — TELEPHONE (OUTPATIENT)
Dept: INTERNAL MEDICINE | Facility: CLINIC | Age: 83
End: 2023-07-23
Payer: COMMERCIAL

## 2023-07-23 DIAGNOSIS — R06.02 SHORTNESS OF BREATH: ICD-10-CM

## 2023-07-23 DIAGNOSIS — R79.89 ELEVATED D-DIMER: Primary | ICD-10-CM

## 2023-07-23 DIAGNOSIS — I26.99 OTHER ACUTE PULMONARY EMBOLISM WITHOUT ACUTE COR PULMONALE (H): ICD-10-CM

## 2023-07-24 ENCOUNTER — HOSPITAL ENCOUNTER (OUTPATIENT)
Dept: CT IMAGING | Facility: HOSPITAL | Age: 83
Discharge: HOME | End: 2023-07-24
Attending: INTERNAL MEDICINE | Admitting: INTERNAL MEDICINE
Payer: COMMERCIAL

## 2023-07-24 DIAGNOSIS — R79.89 ELEVATED D-DIMER: ICD-10-CM

## 2023-07-24 DIAGNOSIS — R06.02 SHORTNESS OF BREATH: ICD-10-CM

## 2023-07-24 LAB — RADIOLOGIST FLAGS: ABNORMAL

## 2023-07-24 PROCEDURE — 71275 CT ANGIOGRAPHY CHEST: CPT

## 2023-07-24 PROCEDURE — 250N000011 HC RX IP 250 OP 636: Performed by: INTERNAL MEDICINE

## 2023-07-24 RX ORDER — IOPAMIDOL 755 MG/ML
67 INJECTION, SOLUTION INTRAVASCULAR ONCE
Status: COMPLETED | OUTPATIENT
Start: 2023-07-24 | End: 2023-07-24

## 2023-07-24 RX ADMIN — IOPAMIDOL 67 ML: 755 INJECTION, SOLUTION INTRAVENOUS at 09:58

## 2023-07-24 NOTE — TELEPHONE ENCOUNTER
Spoke with patient, she reports that her shortness of breath has improved since Friday. She understands that if her symptoms worsen she should go to the emergency department. She will contact the clinic if the medication xarelto is too expensive for her. She is going to make a follow-up appointment with Dr. Diaz in the next 2-3 weeks. Pt has the clinic phone number if she has further questions or concerns.

## 2023-07-24 NOTE — TELEPHONE ENCOUNTER
Attempted to call pt, no answer (1/3). Left message requesting pt to call back clinic.     When patient calls back clinic please ask her if she was able to  the Xarelto prescription.

## 2023-07-24 NOTE — TELEPHONE ENCOUNTER
EXAM: CT CHEST PULMONARY EMBOLISM W CONTRAST  LOCATION: [facility]  DATE: 7/24/2023    IMPRESSION:  1.  CTA findings of bilateral acute on chronic pulmonary emboli of low to moderate burden.  2.  CTA signs compatible with some degree of chronic pulmonary arterial hypertension and possible chronic elevated right-sided heart pressures.    Radiologist is in a procedure but will finish reading result when out of procedure. Positive for PE, imaging is holding patient until we call her at mobile phone number    Contact Information  [phone] (Mobile)

## 2023-07-24 NOTE — TELEPHONE ENCOUNTER
If patient is not having worsening shortness of breath and dizziness, okay to treat PE impatiently.  I sent prescription for Xarelto to her pharmacy.  She should take 15 mg twice a day with food for the first 3 weeks and then transition to 20 mg once a day.  If Xarelto is too expensive, please check with our pharmacist to see if discounts can be applied.    She should follow-up with me in the next 2-3 weeks.

## 2023-07-25 NOTE — TELEPHONE ENCOUNTER
Dallas Cox,    Ora take Xarelto twice a day with meals. Before dinner she takes omeprazole 30 minutes prior to her meal. Is there any concerns about it affecting Xarelto absorption?    Dr CLINTON

## 2023-08-07 ENCOUNTER — OFFICE VISIT (OUTPATIENT)
Dept: INTERNAL MEDICINE | Facility: CLINIC | Age: 83
End: 2023-08-07
Payer: COMMERCIAL

## 2023-08-07 VITALS
BODY MASS INDEX: 23.17 KG/M2 | SYSTOLIC BLOOD PRESSURE: 118 MMHG | HEART RATE: 79 BPM | HEIGHT: 61 IN | WEIGHT: 122.7 LBS | DIASTOLIC BLOOD PRESSURE: 66 MMHG | OXYGEN SATURATION: 97 % | RESPIRATION RATE: 16 BRPM | TEMPERATURE: 97.1 F

## 2023-08-07 DIAGNOSIS — I26.94 MULTIPLE SUBSEGMENTAL PULMONARY EMBOLI WITHOUT ACUTE COR PULMONALE (H): Primary | ICD-10-CM

## 2023-08-07 DIAGNOSIS — I10 PRIMARY HYPERTENSION: ICD-10-CM

## 2023-08-07 PROCEDURE — 99214 OFFICE O/P EST MOD 30 MIN: CPT | Performed by: INTERNAL MEDICINE

## 2023-08-07 NOTE — PROGRESS NOTES
Assessment & Plan     Multiple subsegmental pulmonary emboli without acute cor pulmonale (H)  Currently shortness of breath has resolved since she has been on Xarelto.  She will be transitioning to once a day dose next week.  Of note CT scan showed moderate burden of bilateral PEs as well as evidence of previously chronic PEs as well as acute.  For this reason we will continue her on lifelong Xarelto.  Prior to this episode she did have postop DVT and was anticoagulated for 3 months.    CT scan also mention possible pulmonary artery hypertension.  Currently since she is asymptomatic will let her stay on anticoagulation for 3 months and have echocardiogram checked at that time to see how pressures are.  Could be sequela of chronic PEs.    Another interesting factor is that when she had most recent surgery I think a year or 2 ago she did have postoperative anemia without a clear cause.  Her hemoglobin dipped into 7.1 and then it spontaneously resolved.  She did not have any blood loss at that time.    - rivaroxaban ANTICOAGULANT (XARELTO) 20 MG TABS tablet; Take 1 tablet (20 mg) by mouth daily (with dinner)    For age-appropriate cancer screening, she will be due for mammogram this September.  She last had abdominal CT scan done in 2017 due to bowel obstruction, no suspicious masses were seen at that time.    Primary hypertension  Pressures well-controlled on losartan and amlodipine.    Jessica Diaz MD  [facility]   Ora is a 82 year old, presenting for the following health issues:  RECHECK      8/7/2023    10:19 AM   Additional Questions   Roomed by Christina   Accompanied by N/A         8/7/2023    10:19 AM   Patient Reported Additional Medications   Patient reports taking the following new medications N/A     Ora is a 82 y.o. female with h/o  of mild kidney insufficiency, previous cecal volvulus requiring ileostomy, postoperative DVT and PE, cholecystectomy, history of  "rheumatic fever and seasonal allergies, post operative recurrent anemia (which resolved spontaneously), Chris's esophagus, .seasonal allergies.  She is currently here in follow-up for recent diagnosis of pulmonary embolism.    I saw Ora 2 weeks ago with complaints of increased shortness of breath.  At that time her oxygen saturation blood pressure and pulse were normal.  CBC EKG BMP were normal.  Due to previous history of postop DVT we did check a D-dimer which was slightly elevated at 2.  CTA of her chest did show bilateral acute on chronic pulmonary emboli of low to moderate burden and some degree of chronic pulmonary arterial hypertension and possible chronic elevated right-sided heart pressures.    Ora was started on Xarelto right away, currently she is still taking 15 mg twice a day and will be transitioning to once a day dose next week.  She is feeling much better.  Shortness of breath has resolved.    There was no precipitating factors for this current pulmonary embolism.  She denies any trauma, immobility or recent travel.  It is interesting that CT scan also showed chronic PE, unclear when that happened.    Of note last year she did have abdominal surgery due to bowel obstruction and had postoperative prolonged anemia but no signs of symptoms of PE or DVT.        History of Present Illness       Reason for visit:  Follow-up for shortness of breath    She eats 2-3 servings of fruits and vegetables daily.She consumes 1 sweetened beverage(s) daily.She exercises with enough effort to increase her heart rate 20 to 29 minutes per day.  She exercises with enough effort to increase her heart rate 7 days per week.   She is taking medications regularly.       Review of Systems   As above      Objective    /66 (BP Location: Left arm, Patient Position: Sitting, Cuff Size: Adult Regular)   Pulse 79   Temp 97.1  F (36.2  C) (Tympanic)   Resp 16   Ht 1.549 m (5' 1\")   Wt 55.7 kg (122 lb 11.2 oz)   LMP " 07/21/1992 (Approximate)   SpO2 97%   Breastfeeding No   BMI 23.18 kg/m    Body mass index is 23.18 kg/m .  Physical Exam   General: well appearing female, alert and oriented x3  EYES: Eyelids, conjunctiva, and sclera were normal. Pupils were normal.   HEAD, EARS, NOSE, MOUTH, AND THROAT: no cervical LAD, no thyromegaly or nodules appreciated. TMs are visualized and normal, oropharynx is clear.  RESPIRATORY: respirations non labored, CTA bl, no wheezes, rales, no forced expiratory wheezing.  CARDIOVASCULAR: Heart rate and rhythm were normal. No murmurs, rubs,gallops. There was no peripheral edema. No carotid bruits.  GASTROINTESTINAL: Positive bowel sounds, abdomen is soft, non tender, non distended.     MUSCULOSKELETAL: Muscle mass was normal for age. No joint synovitis or deformity.  LYMPHATIC: There were no enlarged nodes palpable.  SKIN/HAIR/NAILS: Skin color was normal.  No rashes.  NEUROLOGIC: The patient was alert and oriented.  Speech was normal.  There is no facial asymmetry.   PSYCHIATRIC:  Mood and affect were normal.      Answers submitted by the patient for this visit:  General Questionnaire (Submitted on 7/31/2023)  Chief Complaint: Chronic problems general questions HPI Form  What is the reason for your visit today? : Follow-up for shortness of breath  How many servings of fruits and vegetables do you eat daily?: 2-3  On average, how many sweetened beverages do you drink each day (Examples: soda, juice, sweet tea, etc.  Do NOT count diet or artificially sweetened beverages)?: 1  How many minutes a day do you exercise enough to make your heart beat faster?: 20 to 29  How many days a week do you exercise enough to make your heart beat faster?: 7  How many days per week do you miss taking your medication?: 0

## 2023-08-07 NOTE — PATIENT INSTRUCTIONS
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    2. We will check heart US in 3 months.

## 2023-09-27 ENCOUNTER — TELEPHONE (OUTPATIENT)
Dept: INTERNAL MEDICINE | Facility: CLINIC | Age: 83
End: 2023-09-27
Payer: COMMERCIAL

## 2023-09-27 DIAGNOSIS — Z78.0 POST-MENOPAUSAL: Primary | ICD-10-CM

## 2023-09-27 NOTE — TELEPHONE ENCOUNTER
General Call    Contacts         Type Contact Phone/Fax    09/27/2023 02:49 PM CDT Phone (Incoming) Ora Og (Self) [phone] (M)          Reason for Call:  Pt scheduled DEXA scan for 10/23/23, needs orders put in.

## 2023-10-23 ENCOUNTER — ANCILLARY PROCEDURE (OUTPATIENT)
Dept: BONE DENSITY | Facility: CLINIC | Age: 83
End: 2023-10-23
Payer: COMMERCIAL

## 2023-10-23 ENCOUNTER — ANCILLARY PROCEDURE (OUTPATIENT)
Dept: MAMMOGRAPHY | Facility: CLINIC | Age: 83
End: 2023-10-23
Attending: INTERNAL MEDICINE
Payer: COMMERCIAL

## 2023-10-23 DIAGNOSIS — Z12.31 VISIT FOR SCREENING MAMMOGRAM: ICD-10-CM

## 2023-10-23 DIAGNOSIS — Z78.0 POST-MENOPAUSAL: ICD-10-CM

## 2023-10-23 PROCEDURE — 77080 DXA BONE DENSITY AXIAL: CPT | Mod: TC | Performed by: RADIOLOGY

## 2023-10-23 PROCEDURE — 77063 BREAST TOMOSYNTHESIS BI: CPT | Mod: TC | Performed by: RADIOLOGY

## 2023-10-23 PROCEDURE — 77067 SCR MAMMO BI INCL CAD: CPT | Mod: TC | Performed by: RADIOLOGY

## 2023-10-26 ASSESSMENT — ENCOUNTER SYMPTOMS
FREQUENCY: 0
FEVER: 0
SHORTNESS OF BREATH: 0
ABDOMINAL PAIN: 0
NERVOUS/ANXIOUS: 1
PALPITATIONS: 0
BREAST MASS: 0
HEMATOCHEZIA: 0
EYE PAIN: 0
ARTHRALGIAS: 1
COUGH: 0
DIARRHEA: 0
SORE THROAT: 0
HEARTBURN: 0
PARESTHESIAS: 0
CONSTIPATION: 0
HEADACHES: 0
CHILLS: 0
NAUSEA: 0
JOINT SWELLING: 0
DYSURIA: 0
HEMATURIA: 0
WEAKNESS: 0
DIZZINESS: 0

## 2023-10-26 ASSESSMENT — ACTIVITIES OF DAILY LIVING (ADL): CURRENT_FUNCTION: NO ASSISTANCE NEEDED

## 2023-11-02 ENCOUNTER — OFFICE VISIT (OUTPATIENT)
Dept: INTERNAL MEDICINE | Facility: CLINIC | Age: 83
End: 2023-11-02
Payer: COMMERCIAL

## 2023-11-02 VITALS
HEART RATE: 76 BPM | TEMPERATURE: 97.6 F | RESPIRATION RATE: 14 BRPM | DIASTOLIC BLOOD PRESSURE: 70 MMHG | OXYGEN SATURATION: 97 % | BODY MASS INDEX: 23.26 KG/M2 | HEIGHT: 61 IN | WEIGHT: 123.2 LBS | SYSTOLIC BLOOD PRESSURE: 122 MMHG

## 2023-11-02 DIAGNOSIS — I26.94 MULTIPLE SUBSEGMENTAL PULMONARY EMBOLI WITHOUT ACUTE COR PULMONALE (H): ICD-10-CM

## 2023-11-02 DIAGNOSIS — Z00.00 ENCOUNTER FOR MEDICARE ANNUAL WELLNESS EXAM: Primary | ICD-10-CM

## 2023-11-02 DIAGNOSIS — Z63.6 CAREGIVER STRESS: ICD-10-CM

## 2023-11-02 DIAGNOSIS — I10 ESSENTIAL HYPERTENSION: ICD-10-CM

## 2023-11-02 DIAGNOSIS — E55.9 VITAMIN D DEFICIENCY: ICD-10-CM

## 2023-11-02 DIAGNOSIS — K22.70 BARRETT'S ESOPHAGUS WITHOUT DYSPLASIA: ICD-10-CM

## 2023-11-02 LAB
ALBUMIN SERPL BCG-MCNC: 4.2 G/DL (ref 3.5–5.2)
ALP SERPL-CCNC: 65 U/L (ref 35–104)
ALT SERPL W P-5'-P-CCNC: 15 U/L (ref 0–50)
ANION GAP SERPL CALCULATED.3IONS-SCNC: 10 MMOL/L (ref 7–15)
AST SERPL W P-5'-P-CCNC: 28 U/L (ref 0–45)
BASOPHILS # BLD AUTO: 0 10E3/UL (ref 0–0.2)
BASOPHILS NFR BLD AUTO: 0 %
BILIRUB SERPL-MCNC: 0.4 MG/DL
BUN SERPL-MCNC: 19.1 MG/DL (ref 8–23)
CALCIUM SERPL-MCNC: 10.1 MG/DL (ref 8.8–10.2)
CHLORIDE SERPL-SCNC: 101 MMOL/L (ref 98–107)
CREAT SERPL-MCNC: 0.83 MG/DL (ref 0.51–0.95)
DEPRECATED HCO3 PLAS-SCNC: 27 MMOL/L (ref 22–29)
EGFRCR SERPLBLD CKD-EPI 2021: 70 ML/MIN/1.73M2
EOSINOPHIL # BLD AUTO: 0.2 10E3/UL (ref 0–0.7)
EOSINOPHIL NFR BLD AUTO: 4 %
ERYTHROCYTE [DISTWIDTH] IN BLOOD BY AUTOMATED COUNT: 12.9 % (ref 10–15)
GLUCOSE SERPL-MCNC: 96 MG/DL (ref 70–99)
HCT VFR BLD AUTO: 38.7 % (ref 35–47)
HGB BLD-MCNC: 12.4 G/DL (ref 11.7–15.7)
IMM GRANULOCYTES # BLD: 0 10E3/UL
IMM GRANULOCYTES NFR BLD: 0 %
LYMPHOCYTES # BLD AUTO: 1.2 10E3/UL (ref 0.8–5.3)
LYMPHOCYTES NFR BLD AUTO: 26 %
MCH RBC QN AUTO: 30 PG (ref 26.5–33)
MCHC RBC AUTO-ENTMCNC: 32 G/DL (ref 31.5–36.5)
MCV RBC AUTO: 94 FL (ref 78–100)
MONOCYTES # BLD AUTO: 0.5 10E3/UL (ref 0–1.3)
MONOCYTES NFR BLD AUTO: 11 %
NEUTROPHILS # BLD AUTO: 2.7 10E3/UL (ref 1.6–8.3)
NEUTROPHILS NFR BLD AUTO: 59 %
PLATELET # BLD AUTO: 201 10E3/UL (ref 150–450)
POTASSIUM SERPL-SCNC: 5.2 MMOL/L (ref 3.4–5.3)
PROT SERPL-MCNC: 7.3 G/DL (ref 6.4–8.3)
RBC # BLD AUTO: 4.13 10E6/UL (ref 3.8–5.2)
SODIUM SERPL-SCNC: 138 MMOL/L (ref 135–145)
TSH SERPL DL<=0.005 MIU/L-ACNC: 1.83 UIU/ML (ref 0.3–4.2)
VIT D+METAB SERPL-MCNC: 50 NG/ML (ref 20–50)
WBC # BLD AUTO: 4.5 10E3/UL (ref 4–11)

## 2023-11-02 PROCEDURE — 82306 VITAMIN D 25 HYDROXY: CPT | Performed by: INTERNAL MEDICINE

## 2023-11-02 PROCEDURE — 85025 COMPLETE CBC W/AUTO DIFF WBC: CPT | Performed by: INTERNAL MEDICINE

## 2023-11-02 PROCEDURE — 80053 COMPREHEN METABOLIC PANEL: CPT | Performed by: INTERNAL MEDICINE

## 2023-11-02 PROCEDURE — 84443 ASSAY THYROID STIM HORMONE: CPT | Performed by: INTERNAL MEDICINE

## 2023-11-02 PROCEDURE — 99214 OFFICE O/P EST MOD 30 MIN: CPT | Mod: 25 | Performed by: INTERNAL MEDICINE

## 2023-11-02 PROCEDURE — G0439 PPPS, SUBSEQ VISIT: HCPCS | Performed by: INTERNAL MEDICINE

## 2023-11-02 PROCEDURE — 36415 COLL VENOUS BLD VENIPUNCTURE: CPT | Performed by: INTERNAL MEDICINE

## 2023-11-02 RX ORDER — RESPIRATORY SYNCYTIAL VIRUS VACCINE 120MCG/0.5
0.5 KIT INTRAMUSCULAR ONCE
Qty: 1 EACH | Refills: 0 | Status: CANCELLED | OUTPATIENT
Start: 2023-11-02 | End: 2023-11-02

## 2023-11-02 SDOH — SOCIAL STABILITY - SOCIAL INSECURITY: DEPENDENT RELATIVE NEEDING CARE AT HOME: Z63.6

## 2023-11-02 ASSESSMENT — ENCOUNTER SYMPTOMS
SHORTNESS OF BREATH: 0
DIZZINESS: 0
DIARRHEA: 0
ABDOMINAL PAIN: 0
HEMATOCHEZIA: 0
PARESTHESIAS: 0
EYE PAIN: 0
COUGH: 0
CONSTIPATION: 0
NAUSEA: 0
DYSURIA: 0
HEARTBURN: 0
JOINT SWELLING: 0
HEADACHES: 0
HEMATURIA: 0
PALPITATIONS: 0
NERVOUS/ANXIOUS: 1
WEAKNESS: 0
CHILLS: 0
BREAST MASS: 0
FREQUENCY: 0
FEVER: 0
ARTHRALGIAS: 1
SORE THROAT: 0

## 2023-11-02 ASSESSMENT — PAIN SCALES - GENERAL: PAINLEVEL: NO PAIN (0)

## 2023-11-02 ASSESSMENT — ACTIVITIES OF DAILY LIVING (ADL): CURRENT_FUNCTION: NO ASSISTANCE NEEDED

## 2023-11-02 NOTE — PROGRESS NOTES
SUBJECTIVE:   Ora is a 83 year old who presents for Preventive Visit.      11/2/2023    11:15 AM   Additional Questions   Roomed by ronny winkler       Are you in the first 12 months of your Medicare coverage?  No    Ora is a 83 y.o. female with h/o  of mild kidney insufficiency, previous cecal volvulus requiring ileostomy, postoperative DVT and PE, cholecystectomy, history of rheumatic fever and seasonal allergies, post operative recurrent anemia (which resolved spontaneously), Chris's esophagus, .seasonal allergies, family history of breast cancer in her sister.  She is currently here for a wellness exam.    She has had occasional numbness in her left second and third finger on and off but nothing persistent.  She does have significant arthritis in her hands.  No paresthesias in her feet.    She has ophthalmology appointment scheduled in December with [place] [facility], denies any changes in vision, she wears glasses.    She does use hearing aids and hearing is good.    No problems with balance or falls.    She was diagnosed with recurrent spontaneous bilateral PE in July, CT scan at that time also showed chronic pulmonary embolism as well.  She is currently on Xarelto and will stay on it lifelong.  Shortness of breath has resolved since she has been on anticoagulation.  She does wears compression stockings.    Is up-to-date on mammogram earlier this year which was negative, she does have family history of breast cancer in her sister.    She has history of Smith's esophagus and last endoscopy was in 2022, no ITP on pathology, she is on PPI and 3-year follow-up schedule.    Has been has Parkinson's disease and that has been stressful since he does require a lot of medical visits but they do have family members in the area who are supportive.    Healthy Habits:     In general, how would you rate your overall health?  Excellent    Frequency of exercise:  6-7 days/week    Duration of exercise:  30-45 minutes    Do you  "usually eat at least 4 servings of fruit and vegetables a day, include whole grains    & fiber and avoid regularly eating high fat or \"junk\" foods?  Yes    Taking medications regularly:  Yes    Medication side effects:  None    Ability to successfully perform activities of daily living:  No assistance needed    Home Safety:  No safety concerns identified    Hearing Impairment:  Difficulty following a conversation in a noisy restaurant or crowded room, feel that people are mumbling or not speaking clearly, difficulty following dialogue in the theater, difficult to understand a speaker at a public meeting or [employer] service, need to ask people to speak up or repeat themselves, find that men's voices are easier to understand than woman's, difficulty understanding soft or whispered speech and difficulty understanding speech on the telephone    In the past 6 months, have you been bothered by leaking of urine?  No    In general, how would you rate your overall mental or emotional health?  Good    Additional concerns today:  No      Today's PHQ-2 Score:       11/1/2023    12:07 PM   PHQ-2 ( 1999 [employer])   Q1: Little interest or pleasure in doing things 0   Q2: Feeling down, depressed or hopeless 0   PHQ-2 Score 0   Q1: Little interest or pleasure in doing things Not at all   Q2: Feeling down, depressed or hopeless Not at all   PHQ-2 Score 0           Have you ever done Advance Care Planning? (For example, a Health Directive, POLST, or a discussion with a medical provider or your loved ones about your wishes): Yes, advance care planning is on file.  Fall risk  Fallen 2 or more times in the past year?: No  Any fall with injury in the past year?: No    Cognitive Screening   1) Repeat 3 items (Leader, Season, Table)    2) Clock draw: NORMAL  3) 3 item recall: Recalls 3 objects  Results: 3 items recalled: COGNITIVE IMPAIRMENT LESS LIKELY    Mini-CogTM Copyright S [employer]. Licensed by the author for use in [employer] " Services; reprinted with permission (soob@.[facility]). All rights reserved.      Do you have sleep apnea, excessive snoring or daytime drowsiness? : no    Reviewed and updated as needed this visit by clinical staff   Tobacco  Allergies  Meds              Reviewed and updated as needed this visit by Provider                 Social History     Tobacco Use     Smoking status: Never     Smokeless tobacco: Never   Substance Use Topics     Alcohol use: Yes     Comment: Alcoholic Drinks/day: 1/diego.         10/26/2023     9:49 AM   Alcohol Use   Prescreen: >3 drinks/day or >7 drinks/week? No     Do you have a current opioid prescription? No  Do you use any other controlled substances or medications that are not prescribed by a provider? None      Current providers sharing in care for this patient include:   Patient Care Team:  Jessica Diaz MD as PCP - General  Alyx Baumann MD as MD (Hematology & Oncology)  Jessica Diaz MD as Assigned PCP    The following health maintenance items are reviewed in Epic and correct as of today:  Health Maintenance   Topic Date Due     RSV VACCINE (Pregnancy & 60+) (1 - 1-dose 60+ series) Never done     ZOSTER IMMUNIZATION (2 of 3) 05/02/2017     ANNUAL REVIEW OF HM ORDERS  10/11/2022     MEDICARE ANNUAL WELLNESS VISIT  10/26/2023     FALL RISK ASSESSMENT  11/02/2024     DEXA  10/23/2025     DTAP/TDAP/TD IMMUNIZATION (2 - Td or Tdap) 09/20/2026     ADVANCE CARE PLANNING  10/26/2027     PHQ-2 (once per calendar year)  Completed     INFLUENZA VACCINE  Completed     Pneumococcal Vaccine: 65+ Years  Completed     COVID-19 Vaccine  Completed     IPV IMMUNIZATION  Aged Out     HPV IMMUNIZATION  Aged Out     MENINGITIS IMMUNIZATION  Aged Out     RSV MONOCLONAL ANTIBODY  Aged Out   Pertinent mammograms are reviewed under the imaging tab.    Review of Systems   Constitutional:  Negative for chills and fever.   HENT:  Positive for hearing loss. Negative for congestion, ear pain and  "sore throat.    Eyes:  Negative for pain and visual disturbance.   Respiratory:  Negative for cough and shortness of breath.    Cardiovascular:  Negative for chest pain, palpitations and peripheral edema.   Gastrointestinal:  Negative for abdominal pain, constipation, diarrhea, heartburn, hematochezia and nausea.   Breasts:  Negative for tenderness, breast mass and discharge.   Genitourinary:  Negative for dysuria, frequency, genital sores, hematuria, pelvic pain, urgency, vaginal bleeding and vaginal discharge.   Musculoskeletal:  Positive for arthralgias. Negative for joint swelling.   Skin:  Negative for rash.   Neurological:  Negative for dizziness, weakness, headaches and paresthesias.   Psychiatric/Behavioral:  Negative for mood changes. The patient is nervous/anxious.        OBJECTIVE:   /70 (BP Location: Left arm, Patient Position: Sitting, Cuff Size: Adult Small)   Pulse 76   Temp 97.6  F (36.4  C) (Oral)   Resp 14   Ht 1.549 m (5' 1\")   Wt 55.9 kg (123 lb 3.2 oz)   LMP 07/21/1992 (Approximate)   SpO2 97%   BMI 23.28 kg/m   Estimated body mass index is 23.28 kg/m  as calculated from the following:    Height as of this encounter: 1.549 m (5' 1\").    Weight as of this encounter: 55.9 kg (123 lb 3.2 oz).  General: well appearing female, alert and oriented x3  EYES: Eyelids, conjunctiva, and sclera were normal. Pupils were normal.   HEAD, EARS, NOSE, MOUTH, AND THROAT: no cervical LAD, no thyromegaly or nodules appreciated. TMs are visualized and normal, oropharynx is clear.  RESPIRATORY: respirations non labored, CTA bl, no wheezes, rales, no forced expiratory wheezing.  CARDIOVASCULAR: Heart rate and rhythm were normal. No murmurs, rubs,gallops. There was no peripheral edema. No carotid bruits.  GASTROINTESTINAL: Positive bowel sounds, abdomen is soft, non tender, non distended.     MUSCULOSKELETAL: Muscle mass was normal for age. No joint synovitis or deformity.  LYMPHATIC: There were no " enlarged nodes palpable.  SKIN/HAIR/NAILS: Skin color was normal.  No rashes.  NEUROLOGIC: The patient was alert and oriented.  Speech was normal.  There is no facial asymmetry.   PSYCHIATRIC:  Mood and affect were normal.   Breast exam: No axilla lymphadenopathy, breast masses or skin changes appreciated.    ASSESSMENT / PLAN:   Ora was seen today for recheck medication and physical.    Diagnoses and all orders for this visit:    Encounter for Medicare annual wellness exam  Cholesterol was excellent last year and has been like that for the last couple of years, we can omit checking it today, rest of the blood work will be checked today.  She is up-to-date on mammogram (she does have family history of breast cancer in her sister).  She is up-to-date on COVID and flu shot and will get RSV vaccine at the pharmacy.    Vitamin D deficiency  -     Vitamin D Deficiency    Smith's esophagus without dysplasia  She had endoscopy done in 2022 which showed short segment of Smith's esophagus without atypia, 3-year follow-up was recommended, she is on PPI and denies any dysphagia.  -     CBC with platelets and differential    Essential hypertension  Well-controlled on losartan and amlodipine, she denies any dizziness or orthostasis.  -     Comprehensive metabolic panel  -     TSH with free T4 reflex    Multiple subsegmental pulmonary emboli without acute cor pulmonale (H)  She had spontaneous multiple small PEs in July 2023 and evidence of chronic clot as well.  Because of it she will continue on long-term Xarelto, she is tolerating it well.  No shortness of breath or blood in the stool or urine.    Caregiver stress  Her  has Parkinson's disease and has a lot of medical appointments and follow-ups.  They do have family in the area which are very supportive.  Ora denies depression but does occasionally gets anxious.  She still able to relax.  She sleeps well.    Other orders  -     PRIMARY CARE FOLLOW-UP SCHEDULING;  "Future        She reports that she has never smoked. She has never used smokeless tobacco.      Appropriate preventive services were discussed with this patient, including applicable screening as appropriate for fall prevention, nutrition, physical activity, Tobacco-use cessation, weight loss and cognition.  Checklist reviewing preventive services available has been given to the patient.    Reviewed patients plan of care and provided an AVS. The Basic Care Plan (routine screening as documented in Health Maintenance) for Ora meets the Care Plan requirement. This Care Plan has been established and reviewed with the Patient.  Jessica Diaz MD  [facility]    Identified Health Risks:    Answers submitted by the patient for this visit:  Annual Preventive Visit (Submitted on 10/26/2023)  Chief Complaint: Annual Exam:  In general, how would you rate your overall physical health?: excellent  Frequency of exercise:: 6-7 days/week  Do you usually eat at least 4 servings of fruit and vegetables a day, include whole grains & fiber, and avoid regularly eating high fat or \"junk\" foods? : Yes  Taking medications regularly:: Yes  Medication side effects:: None  Activities of Daily Living: no assistance needed  Home safety: no safety concerns identified  Hearing Impairment:: difficulty following a conversation in a noisy restaurant or crowded room, feel that people are mumbling or not speaking clearly, difficulty following dialogue in the theater, difficult to understand a speaker at a public meeting or [employer] service, need to ask people to speak up or repeat themselves, find that men's voices are easier to understand than woman's, difficulty understanding soft or whispered speech, difficulty understanding speech on the telephone  In the past 6 months, have you been bothered by leaking of urine?: No  abdominal pain: No  Blood in stool: No  Blood in urine: No  chest pain: No  chills: " No  congestion: No  constipation: No  cough: No  diarrhea: No  dizziness: No  ear pain: No  eye pain: No  nervous/anxious: Yes  fever: No  frequency: No  genital sores: No  headaches: No  hearing loss: Yes  heartburn: No  arthralgias: Yes  joint swelling: No  peripheral edema: No  mood changes: No  nausea: No  dysuria: No  palpitations: No  Skin sensation changes: No  sore throat: No  urgency: No  rash: No  shortness of breath: No  visual disturbance: No  weakness: No  pelvic pain: No  vaginal bleeding: No  vaginal discharge: No  tenderness: No  breast mass: No  breast discharge: No  In general, how would you rate your overall mental or emotional health?: good  Additional concerns today:: No  Exercise outside of work (Submitted on 10/26/2023)  Chief Complaint: Annual Exam:  Duration of exercise:: 30-45 minutes

## 2023-11-02 NOTE — PATIENT INSTRUCTIONS
Get RSV vaccine at the pharmacy.    2. Magnesium Glycine 250-500 mg at night time can help unwind.      Patient Education   Personalized Prevention Plan  You are due for the preventive services outlined below.  Your care team is available to assist you in scheduling these services.  If you have already completed any of these items, please share that information with your care team to update in your medical record.  Health Maintenance Due   Topic Date Due    RSV VACCINE (Pregnancy & 60+) (1 - 1-dose 60+ series) Never done    Zoster (Shingles) Vaccine (2 of 3) 05/02/2017    ANNUAL REVIEW OF  ORDERS  10/11/2022     Hearing Loss: Care Instructions  Overview     Hearing loss is a sudden or slow decrease in how well you hear. It can range from slight to profound. Permanent hearing loss can occur with aging. It also can happen when you are exposed long-term to loud noise. Examples include listening to loud music, riding motorcycles, or being around other loud machines.  Hearing loss can affect your work and home life. It can make you feel lonely or depressed. You may feel that you have lost your independence. But hearing aids and other devices can help you hear better and feel connected to others.  Follow-up care is a key part of your treatment and safety. Be sure to make and go to all appointments, and call your doctor if you are having problems. It's also a good idea to know your test results and keep a list of the medicines you take.  How can you care for yourself at home?  Avoid loud noises whenever possible. This helps keep your hearing from getting worse.  Always wear hearing protection around loud noises.  Wear a hearing aid as directed.  A professional can help you pick a hearing aid that will work best for you.  You can also get hearing aids over the counter for mild to moderate hearing loss.  Have hearing tests as your doctor suggests. They can show whether your hearing has changed. Your hearing aid may need to  "be adjusted.  Use other devices as needed. These may include:  Telephone amplifiers and hearing aids that can connect to a television, stereo, radio, or microphone.  Devices that use lights or vibrations. These alert you to the doorbell, a ringing telephone, or a baby monitor.  Television closed-captioning. This shows the words at the bottom of the screen. Most new TVs can do this.  TTY (text telephone). This lets you type messages back and forth on the telephone instead of talking or listening. These devices are also called TDD. When messages are typed on the keyboard, they are sent over the phone line to a receiving TTY. The message is shown on a monitor.  Use text messaging, social media, and email if it is hard for you to communicate by telephone.  Try to learn a listening technique called speechreading. It is not lipreading. You pay attention to people's gestures, expressions, posture, and tone of voice. These clues can help you understand what a person is saying. Face the person you are talking to, and have them face you. Make sure the lighting is good. You need to see the other person's face clearly.  Think about counseling if you need help to adjust to your hearing loss.  When should you call for help?  Watch closely for changes in your health, and be sure to contact your doctor if:    You think your hearing is getting worse.     You have new symptoms, such as dizziness or nausea.   Where can you learn more?  Go to https://www.handsomexcutive.net/patiented  Enter R798 in the search box to learn more about \"Hearing Loss: Care Instructions.\"  Current as of: March 1, 2023               Content Version: 13.7    6951-6227 [employer].   Care instructions adapted under license by your healthcare professional. If you have questions about a medical condition or this instruction, always ask your healthcare professional. [employer] disclaims any warranty or liability for your use of this " information.

## 2023-11-17 DIAGNOSIS — I10 ESSENTIAL HYPERTENSION: ICD-10-CM

## 2023-11-17 RX ORDER — LOSARTAN POTASSIUM 25 MG/1
25 TABLET ORAL DAILY
Qty: 90 TABLET | Refills: 3 | Status: SHIPPED | OUTPATIENT
Start: 2023-11-17

## 2024-01-22 DIAGNOSIS — K21.00 GASTROESOPHAGEAL REFLUX DISEASE WITH ESOPHAGITIS WITHOUT HEMORRHAGE: ICD-10-CM

## 2024-01-22 DIAGNOSIS — K22.70 BARRETT'S ESOPHAGUS WITHOUT DYSPLASIA: ICD-10-CM

## 2024-07-10 ENCOUNTER — TELEPHONE (OUTPATIENT)
Dept: INTERNAL MEDICINE | Facility: CLINIC | Age: 84
End: 2024-07-10
Payer: COMMERCIAL

## 2024-07-10 NOTE — TELEPHONE ENCOUNTER
July 10, 2024    POLST was received via fax for Dr. Diaz.  Patient label was attached to paperwork and placed in provider's inbox to be signed.    Krystina Rojas

## 2024-07-11 ENCOUNTER — DOCUMENTATION ONLY (OUTPATIENT)
Dept: OTHER | Facility: CLINIC | Age: 84
End: 2024-07-11
Payer: COMMERCIAL

## 2024-07-11 NOTE — TELEPHONE ENCOUNTER
July 11, 2024    POLST was picked up from outbox of Dr. Diaz and sent via fax to [phone].    Krystina Rojas

## 2024-07-31 DIAGNOSIS — I26.94 MULTIPLE SUBSEGMENTAL PULMONARY EMBOLI WITHOUT ACUTE COR PULMONALE (H): ICD-10-CM

## 2024-07-31 RX ORDER — RIVAROXABAN 20 MG/1
20 TABLET, FILM COATED ORAL
Qty: 90 TABLET | Refills: 3 | Status: SHIPPED | OUTPATIENT
Start: 2024-07-31

## 2024-10-03 ENCOUNTER — PATIENT OUTREACH (OUTPATIENT)
Dept: CARE COORDINATION | Facility: CLINIC | Age: 84
End: 2024-10-03
Payer: COMMERCIAL

## 2024-10-17 ENCOUNTER — PATIENT OUTREACH (OUTPATIENT)
Dept: CARE COORDINATION | Facility: CLINIC | Age: 84
End: 2024-10-17
Payer: COMMERCIAL

## 2024-11-13 ENCOUNTER — LAB REQUISITION (OUTPATIENT)
Dept: LAB | Facility: CLINIC | Age: 84
End: 2024-11-13
Payer: COMMERCIAL

## 2024-11-13 DIAGNOSIS — E55.9 VITAMIN D DEFICIENCY, UNSPECIFIED: ICD-10-CM

## 2024-11-13 DIAGNOSIS — Z79.01 LONG TERM (CURRENT) USE OF ANTICOAGULANTS: ICD-10-CM

## 2024-11-13 DIAGNOSIS — R00.2 PALPITATIONS: ICD-10-CM

## 2024-11-18 LAB
ANION GAP SERPL CALCULATED.3IONS-SCNC: 10 MMOL/L (ref 7–15)
BUN SERPL-MCNC: 27.8 MG/DL (ref 8–23)
CALCIUM SERPL-MCNC: 9.2 MG/DL (ref 8.8–10.4)
CHLORIDE SERPL-SCNC: 110 MMOL/L (ref 98–107)
CHOLEST SERPL-MCNC: 135 MG/DL
CREAT SERPL-MCNC: 0.99 MG/DL (ref 0.51–0.95)
EGFRCR SERPLBLD CKD-EPI 2021: 56 ML/MIN/1.73M2
ERYTHROCYTE [DISTWIDTH] IN BLOOD BY AUTOMATED COUNT: 15.3 % (ref 10–15)
FASTING STATUS PATIENT QL REPORTED: NORMAL
GLUCOSE SERPL-MCNC: 96 MG/DL (ref 70–99)
HCO3 SERPL-SCNC: 22 MMOL/L (ref 22–29)
HCT VFR BLD AUTO: 20.1 % (ref 35–47)
HDLC SERPL-MCNC: 55 MG/DL
HGB BLD-MCNC: 5.6 G/DL (ref 11.7–15.7)
LDLC SERPL CALC-MCNC: 58 MG/DL
MCH RBC QN AUTO: 21.4 PG (ref 26.5–33)
MCHC RBC AUTO-ENTMCNC: 27.9 G/DL (ref 31.5–36.5)
MCV RBC AUTO: 77 FL (ref 78–100)
NONHDLC SERPL-MCNC: 80 MG/DL
PLATELET # BLD AUTO: 283 10E3/UL (ref 150–450)
POTASSIUM SERPL-SCNC: 5 MMOL/L (ref 3.4–5.3)
RBC # BLD AUTO: 2.62 10E6/UL (ref 3.8–5.2)
SODIUM SERPL-SCNC: 142 MMOL/L (ref 135–145)
TRIGL SERPL-MCNC: 108 MG/DL
TSH SERPL DL<=0.005 MIU/L-ACNC: 1.95 UIU/ML (ref 0.3–4.2)
VIT D+METAB SERPL-MCNC: 41 NG/ML (ref 20–50)
WBC # BLD AUTO: 6.4 10E3/UL (ref 4–11)

## 2024-11-18 PROCEDURE — 80061 LIPID PANEL: CPT | Mod: ORL | Performed by: NURSE PRACTITIONER

## 2024-11-18 PROCEDURE — 84443 ASSAY THYROID STIM HORMONE: CPT | Mod: ORL | Performed by: NURSE PRACTITIONER

## 2024-11-18 PROCEDURE — 82306 VITAMIN D 25 HYDROXY: CPT | Mod: ORL | Performed by: NURSE PRACTITIONER

## 2024-11-18 PROCEDURE — 36415 COLL VENOUS BLD VENIPUNCTURE: CPT | Mod: ORL | Performed by: NURSE PRACTITIONER

## 2024-11-18 PROCEDURE — 80048 BASIC METABOLIC PNL TOTAL CA: CPT | Mod: ORL | Performed by: NURSE PRACTITIONER

## 2024-11-18 PROCEDURE — 85027 COMPLETE CBC AUTOMATED: CPT | Mod: ORL | Performed by: NURSE PRACTITIONER

## 2024-11-18 PROCEDURE — P9604 ONE-WAY ALLOW PRORATED TRIP: HCPCS | Mod: ORL | Performed by: NURSE PRACTITIONER

## 2024-11-19 ENCOUNTER — LAB REQUISITION (OUTPATIENT)
Dept: LAB | Facility: CLINIC | Age: 84
End: 2024-11-19
Payer: COMMERCIAL

## 2024-11-19 DIAGNOSIS — D64.9 ANEMIA, UNSPECIFIED: ICD-10-CM

## 2024-11-20 LAB
ERYTHROCYTE [DISTWIDTH] IN BLOOD BY AUTOMATED COUNT: 17.1 % (ref 10–15)
HCT VFR BLD AUTO: 26 % (ref 35–47)
HGB BLD-MCNC: 7.5 G/DL (ref 11.7–15.7)
MCH RBC QN AUTO: 22.9 PG (ref 26.5–33)
MCHC RBC AUTO-ENTMCNC: 28.8 G/DL (ref 31.5–36.5)
MCV RBC AUTO: 80 FL (ref 78–100)
PLATELET # BLD AUTO: 247 10E3/UL (ref 150–450)
RBC # BLD AUTO: 3.27 10E6/UL (ref 3.8–5.2)
WBC # BLD AUTO: 4.3 10E3/UL (ref 4–11)

## 2024-11-20 PROCEDURE — 85027 COMPLETE CBC AUTOMATED: CPT | Mod: ORL | Performed by: NURSE PRACTITIONER

## 2024-11-20 PROCEDURE — 36415 COLL VENOUS BLD VENIPUNCTURE: CPT | Mod: ORL | Performed by: NURSE PRACTITIONER

## 2024-11-20 PROCEDURE — P9604 ONE-WAY ALLOW PRORATED TRIP: HCPCS | Mod: ORL | Performed by: NURSE PRACTITIONER

## 2024-11-21 ENCOUNTER — LAB REQUISITION (OUTPATIENT)
Dept: LAB | Facility: CLINIC | Age: 84
End: 2024-11-21
Payer: COMMERCIAL

## 2024-11-21 DIAGNOSIS — D64.9 ANEMIA, UNSPECIFIED: ICD-10-CM

## 2024-11-22 ENCOUNTER — LAB REQUISITION (OUTPATIENT)
Dept: LAB | Facility: CLINIC | Age: 84
End: 2024-11-22
Payer: COMMERCIAL

## 2024-11-22 DIAGNOSIS — D64.9 ANEMIA, UNSPECIFIED: ICD-10-CM

## 2024-11-22 LAB
ERYTHROCYTE [DISTWIDTH] IN BLOOD BY AUTOMATED COUNT: 19.3 % (ref 10–15)
HCT VFR BLD AUTO: 24.8 % (ref 35–47)
HGB BLD-MCNC: 7.3 G/DL (ref 11.7–15.7)
MCH RBC QN AUTO: 23.7 PG (ref 26.5–33)
MCHC RBC AUTO-ENTMCNC: 29.4 G/DL (ref 31.5–36.5)
MCV RBC AUTO: 81 FL (ref 78–100)
PLATELET # BLD AUTO: 272 10E3/UL (ref 150–450)
RBC # BLD AUTO: 3.08 10E6/UL (ref 3.8–5.2)
WBC # BLD AUTO: 4.7 10E3/UL (ref 4–11)

## 2024-11-22 PROCEDURE — 85027 COMPLETE CBC AUTOMATED: CPT | Mod: ORL | Performed by: NURSE PRACTITIONER

## 2024-11-22 PROCEDURE — 36415 COLL VENOUS BLD VENIPUNCTURE: CPT | Mod: ORL | Performed by: NURSE PRACTITIONER

## 2024-11-22 PROCEDURE — P9604 ONE-WAY ALLOW PRORATED TRIP: HCPCS | Mod: ORL | Performed by: NURSE PRACTITIONER

## 2024-11-26 LAB
ERYTHROCYTE [DISTWIDTH] IN BLOOD BY AUTOMATED COUNT: 21.2 % (ref 10–15)
HCT VFR BLD AUTO: 27.4 % (ref 35–47)
HGB BLD-MCNC: 7.7 G/DL (ref 11.7–15.7)
MCH RBC QN AUTO: 23.3 PG (ref 26.5–33)
MCHC RBC AUTO-ENTMCNC: 28.1 G/DL (ref 31.5–36.5)
MCV RBC AUTO: 83 FL (ref 78–100)
PLATELET # BLD AUTO: 270 10E3/UL (ref 150–450)
RBC # BLD AUTO: 3.31 10E6/UL (ref 3.8–5.2)
WBC # BLD AUTO: 6.1 10E3/UL (ref 4–11)

## 2024-12-02 ENCOUNTER — TRANSFERRED RECORDS (OUTPATIENT)
Dept: HEALTH INFORMATION MANAGEMENT | Facility: CLINIC | Age: 84
End: 2024-12-02
Payer: COMMERCIAL

## 2024-12-13 ENCOUNTER — LAB REQUISITION (OUTPATIENT)
Dept: LAB | Facility: CLINIC | Age: 84
End: 2024-12-13
Payer: COMMERCIAL

## 2024-12-13 DIAGNOSIS — E61.1 IRON DEFICIENCY: ICD-10-CM

## 2024-12-13 DIAGNOSIS — D64.9 ANEMIA, UNSPECIFIED: ICD-10-CM

## 2024-12-14 ENCOUNTER — HEALTH MAINTENANCE LETTER (OUTPATIENT)
Age: 84
End: 2024-12-14

## 2024-12-17 LAB
ANION GAP SERPL CALCULATED.3IONS-SCNC: 6 MMOL/L (ref 7–15)
BUN SERPL-MCNC: 19.8 MG/DL (ref 8–23)
CALCIUM SERPL-MCNC: 9.5 MG/DL (ref 8.8–10.4)
CHLORIDE SERPL-SCNC: 107 MMOL/L (ref 98–107)
CREAT SERPL-MCNC: 0.91 MG/DL (ref 0.51–0.95)
EGFRCR SERPLBLD CKD-EPI 2021: 62 ML/MIN/1.73M2
ERYTHROCYTE [DISTWIDTH] IN BLOOD BY AUTOMATED COUNT: ABNORMAL %
GLUCOSE SERPL-MCNC: 89 MG/DL (ref 70–99)
HCO3 SERPL-SCNC: 28 MMOL/L (ref 22–29)
HCT VFR BLD AUTO: 28.5 % (ref 35–47)
HGB BLD-MCNC: 8.2 G/DL (ref 11.7–15.7)
MCH RBC QN AUTO: 24.8 PG (ref 26.5–33)
MCHC RBC AUTO-ENTMCNC: 28.8 G/DL (ref 31.5–36.5)
MCV RBC AUTO: 86 FL (ref 78–100)
PLAT MORPH BLD: NORMAL
PLATELET # BLD AUTO: 227 10E3/UL (ref 150–450)
POTASSIUM SERPL-SCNC: 4.4 MMOL/L (ref 3.4–5.3)
RBC # BLD AUTO: 3.31 10E6/UL (ref 3.8–5.2)
RBC MORPH BLD: NORMAL
SODIUM SERPL-SCNC: 141 MMOL/L (ref 135–145)
WBC # BLD AUTO: 3.9 10E3/UL (ref 4–11)

## 2025-01-08 ENCOUNTER — LAB REQUISITION (OUTPATIENT)
Dept: LAB | Facility: CLINIC | Age: 85
End: 2025-01-08
Payer: COMMERCIAL

## 2025-01-08 DIAGNOSIS — D64.9 ANEMIA, UNSPECIFIED: ICD-10-CM

## 2025-01-09 LAB
ANION GAP SERPL CALCULATED.3IONS-SCNC: 9 MMOL/L (ref 7–15)
BUN SERPL-MCNC: 20.2 MG/DL (ref 8–23)
CALCIUM SERPL-MCNC: 9.4 MG/DL (ref 8.8–10.4)
CHLORIDE SERPL-SCNC: 109 MMOL/L (ref 98–107)
CREAT SERPL-MCNC: 0.91 MG/DL (ref 0.51–0.95)
EGFRCR SERPLBLD CKD-EPI 2021: 62 ML/MIN/1.73M2
ERYTHROCYTE [DISTWIDTH] IN BLOOD BY AUTOMATED COUNT: 21.2 % (ref 10–15)
FERRITIN SERPL-MCNC: 55 NG/ML (ref 11–328)
GLUCOSE SERPL-MCNC: 92 MG/DL (ref 70–99)
HCO3 SERPL-SCNC: 25 MMOL/L (ref 22–29)
HCT VFR BLD AUTO: 29.1 % (ref 35–47)
HGB BLD-MCNC: 8.3 G/DL (ref 11.7–15.7)
IRON BINDING CAPACITY (ROCHE): 349 UG/DL (ref 240–430)
IRON SATN MFR SERPL: 3 % (ref 15–46)
IRON SERPL-MCNC: 12 UG/DL (ref 37–145)
MCH RBC QN AUTO: 25.9 PG (ref 26.5–33)
MCHC RBC AUTO-ENTMCNC: 28.5 G/DL (ref 31.5–36.5)
MCV RBC AUTO: 91 FL (ref 78–100)
PLATELET # BLD AUTO: 222 10E3/UL (ref 150–450)
POTASSIUM SERPL-SCNC: 4.7 MMOL/L (ref 3.4–5.3)
RBC # BLD AUTO: 3.21 10E6/UL (ref 3.8–5.2)
SODIUM SERPL-SCNC: 143 MMOL/L (ref 135–145)
WBC # BLD AUTO: 4.4 10E3/UL (ref 4–11)

## 2025-01-09 PROCEDURE — 83550 IRON BINDING TEST: CPT | Mod: ORL | Performed by: NURSE PRACTITIONER

## 2025-01-09 PROCEDURE — P9604 ONE-WAY ALLOW PRORATED TRIP: HCPCS | Mod: ORL | Performed by: NURSE PRACTITIONER

## 2025-01-09 PROCEDURE — 82728 ASSAY OF FERRITIN: CPT | Mod: ORL | Performed by: NURSE PRACTITIONER

## 2025-01-09 PROCEDURE — 85027 COMPLETE CBC AUTOMATED: CPT | Mod: ORL | Performed by: NURSE PRACTITIONER

## 2025-01-09 PROCEDURE — 80048 BASIC METABOLIC PNL TOTAL CA: CPT | Mod: ORL | Performed by: NURSE PRACTITIONER

## 2025-01-09 PROCEDURE — 36415 COLL VENOUS BLD VENIPUNCTURE: CPT | Mod: ORL | Performed by: NURSE PRACTITIONER

## 2025-01-09 PROCEDURE — 83540 ASSAY OF IRON: CPT | Mod: ORL | Performed by: NURSE PRACTITIONER

## 2025-02-25 ENCOUNTER — TRANSFERRED RECORDS (OUTPATIENT)
Dept: HEALTH INFORMATION MANAGEMENT | Facility: CLINIC | Age: 85
End: 2025-02-25
Payer: COMMERCIAL

## 2025-03-05 ENCOUNTER — TRANSFERRED RECORDS (OUTPATIENT)
Dept: HEALTH INFORMATION MANAGEMENT | Facility: CLINIC | Age: 85
End: 2025-03-05
Payer: COMMERCIAL

## 2025-04-29 ENCOUNTER — LAB REQUISITION (OUTPATIENT)
Dept: LAB | Facility: CLINIC | Age: 85
End: 2025-04-29
Payer: COMMERCIAL

## 2025-04-29 DIAGNOSIS — D64.9 ANEMIA, UNSPECIFIED: ICD-10-CM

## 2025-04-29 DIAGNOSIS — I10 ESSENTIAL (PRIMARY) HYPERTENSION: ICD-10-CM

## 2025-04-30 LAB
ANION GAP SERPL CALCULATED.3IONS-SCNC: 9 MMOL/L (ref 7–15)
BUN SERPL-MCNC: 24.3 MG/DL (ref 8–23)
CALCIUM SERPL-MCNC: 9.3 MG/DL (ref 8.8–10.4)
CHLORIDE SERPL-SCNC: 108 MMOL/L (ref 98–107)
CREAT SERPL-MCNC: 0.93 MG/DL (ref 0.51–0.95)
EGFRCR SERPLBLD CKD-EPI 2021: 61 ML/MIN/1.73M2
ERYTHROCYTE [DISTWIDTH] IN BLOOD BY AUTOMATED COUNT: 17.1 % (ref 10–15)
GLUCOSE SERPL-MCNC: 87 MG/DL (ref 70–99)
HCO3 SERPL-SCNC: 24 MMOL/L (ref 22–29)
HCT VFR BLD AUTO: 28 % (ref 35–47)
HGB BLD-MCNC: 8.3 G/DL (ref 11.7–15.7)
MCH RBC QN AUTO: 27.4 PG (ref 26.5–33)
MCHC RBC AUTO-ENTMCNC: 29.6 G/DL (ref 31.5–36.5)
MCV RBC AUTO: 92 FL (ref 78–100)
PLATELET # BLD AUTO: 206 10E3/UL (ref 150–450)
POTASSIUM SERPL-SCNC: 4.5 MMOL/L (ref 3.4–5.3)
RBC # BLD AUTO: 3.03 10E6/UL (ref 3.8–5.2)
SODIUM SERPL-SCNC: 141 MMOL/L (ref 135–145)
WBC # BLD AUTO: 4.7 10E3/UL (ref 4–11)

## 2025-04-30 PROCEDURE — 80048 BASIC METABOLIC PNL TOTAL CA: CPT | Mod: ORL | Performed by: NURSE PRACTITIONER

## 2025-04-30 PROCEDURE — P9604 ONE-WAY ALLOW PRORATED TRIP: HCPCS | Mod: ORL | Performed by: NURSE PRACTITIONER

## 2025-04-30 PROCEDURE — 36415 COLL VENOUS BLD VENIPUNCTURE: CPT | Mod: ORL | Performed by: NURSE PRACTITIONER

## 2025-04-30 PROCEDURE — 85027 COMPLETE CBC AUTOMATED: CPT | Mod: ORL | Performed by: NURSE PRACTITIONER

## 2025-06-10 ENCOUNTER — TRANSFERRED RECORDS (OUTPATIENT)
Dept: ADMINISTRATIVE | Facility: CLINIC | Age: 85
End: 2025-06-10
Payer: COMMERCIAL

## 2025-06-11 NOTE — PROCEDURES
06/10/2025        Ora Og   [address]  [place] [address]      Ora Og,  :  1940    I'm writing to let you know about the tests that were taken recently.   Thank you for allowing [facility] the opportunity to take part in your healthcare.  At [facility] we strive to provide each patient with the finest gastroenterology care available.  We hope your experience was pleasant and informative.    The hemoglobin has improved to 10.1.  I would continue the prescription iron pill 1 time daily as we discussed.  Will see you back in the clinic as planned.    Call if questions or concerns arise in the interim.  Ferritin 2025 10:08   Description Result Units Flags Range   Ferritin 37 ng/mL     Comments   Performed At: [facility]  [employer] [place], [place], [id number]  Stephen Warner MD, Phone: [phone]   Iron and TIBC 2025 10:08   Description Result Units Flags Range   Iron 197 ug/dL H    Iron Bind.Cap.(TIBC) 395 ug/dL  250-450   Iron Saturation 50 %  15-55   UIBC 198 ug/dL  118-369   Comments   Performed At: [facility], [facility] [address] [place], [place], [id number]  Stephen Warner MD, Phone: [phone]   CBC, Platelet, No Differential 2025 10:08   Description Result Units Flags Range   Hematocrit 33.9 % L 34.0-46.6   Hemoglobin 10.1 g/dL L 11.1-15.9   MCH 28.8 pg  26.6-33.0   MCHC 29.8 g/dL L 31.5-35.7   MCV 97 fL  79-97   Platelets 233 x10E3/uL  150-450   RBC 3.51 x10E6/uL L 3.77-5.28   RDW 14.7 %  11.7-15.4   WBC 3.7 x10E3/uL  3.4-10.8   Comments   Performed At: [facility], [facility] [address] [place], [place], [id number]  Stephen Warner MD, Phone: [phone]         Please call sooner if you have a problem, otherwise I'll see you as we had previously scheduled.        Thank you.    Electronically signed by:  Savage Potter MD 06/10/2025 12:03 PM  Document generated by:  Savage Potter MD  06/10/2025  If your provider ordered multiple tests; the  results may not become available at the same time.  If multiple test results are received within 14 days of one another, you may receive a duplicate.  cc:  Renita Velazquez NP  cc:  Renita Velazquez NP

## (undated) DEVICE — CONNECTOR ONE-LINK INJECTION SITE LF 7N8399

## (undated) DEVICE — CANNULA NASAL COMFORT SOFT 25FT 0537

## (undated) DEVICE — TUBING ENDOGATOR + H2O PORT CO

## (undated) DEVICE — SYR 03ML LL W/O NDL 309657

## (undated) DEVICE — SUCTION CANISTER 1000ML 65651-510

## (undated) DEVICE — PLATE GROUNDING ADULT W/CORD 9165L

## (undated) DEVICE — SWABCAP DISINFECTANT GREEN CFF10-250

## (undated) DEVICE — BITE BLOCK SCOPE DISP 20 X 27 000429

## (undated) DEVICE — CATH SUCTION 14FR W/O CTRL DYND41962

## (undated) DEVICE — KIT SCOPE CLEANING ENDOSCOPY BX00719705

## (undated) DEVICE — TUBING SUCTION MEDI-VAC 1/4"X20' N620A - HE

## (undated) DEVICE — KIT CONNECTOR FOR OLYMPUS ENDOSCOPES DEFENDO 100310

## (undated) DEVICE — FORCEP BIOPSY DISP 000386

## (undated) DEVICE — KIT IV START DYNDV1431

## (undated) DEVICE — SOL WATER IRRIG 500ML BOTTLE 2F7113